# Patient Record
Sex: MALE | Race: WHITE | Employment: OTHER | URBAN - METROPOLITAN AREA
[De-identification: names, ages, dates, MRNs, and addresses within clinical notes are randomized per-mention and may not be internally consistent; named-entity substitution may affect disease eponyms.]

---

## 2017-01-15 ENCOUNTER — HOSPITAL ENCOUNTER (EMERGENCY)
Age: 82
Discharge: HOME | End: 2017-01-16
Attending: STUDENT IN AN ORGANIZED HEALTH CARE EDUCATION/TRAINING PROGRAM
Payer: MEDICARE

## 2017-01-15 DIAGNOSIS — N10 ACUTE PYELONEPHRITIS: Primary | ICD-10-CM

## 2017-01-15 DIAGNOSIS — N28.9 RENAL INSUFFICIENCY: ICD-10-CM

## 2017-01-15 LAB
ALBUMIN SERPL BCP-MCNC: 3.1 G/DL (ref 3.2–4.6)
ALBUMIN/GLOB SERPL: 0.8 {RATIO} (ref 1.2–3.5)
ALP SERPL-CCNC: 77 U/L (ref 50–136)
ALT SERPL-CCNC: 18 U/L (ref 12–65)
ANION GAP BLD CALC-SCNC: 8 MMOL/L (ref 7–16)
AST SERPL W P-5'-P-CCNC: 49 U/L (ref 15–37)
BASOPHILS # BLD AUTO: 0 K/UL (ref 0–0.2)
BASOPHILS # BLD: 0 % (ref 0–2)
BILIRUB SERPL-MCNC: 0.5 MG/DL (ref 0.2–1.1)
BUN SERPL-MCNC: 26 MG/DL (ref 8–23)
CALCIUM SERPL-MCNC: 8.1 MG/DL (ref 8.3–10.4)
CHLORIDE SERPL-SCNC: 107 MMOL/L (ref 98–107)
CO2 SERPL-SCNC: 25 MMOL/L (ref 21–32)
CREAT SERPL-MCNC: 2.01 MG/DL (ref 0.8–1.5)
DIFFERENTIAL METHOD BLD: ABNORMAL
EOSINOPHIL # BLD: 0.1 K/UL (ref 0–0.8)
EOSINOPHIL NFR BLD: 1 % (ref 0.5–7.8)
ERYTHROCYTE [DISTWIDTH] IN BLOOD BY AUTOMATED COUNT: 14.2 % (ref 11.9–14.6)
FLUAV AG NPH QL IA: NEGATIVE
FLUBV AG NPH QL IA: NEGATIVE
GLOBULIN SER CALC-MCNC: 4 G/DL (ref 2.3–3.5)
GLUCOSE SERPL-MCNC: 114 MG/DL (ref 65–100)
HCT VFR BLD AUTO: 37 % (ref 41.1–50.3)
HGB BLD-MCNC: 12.1 G/DL (ref 13.6–17.2)
IMM GRANULOCYTES # BLD: 0 K/UL (ref 0–0.5)
IMM GRANULOCYTES NFR BLD AUTO: 0.2 % (ref 0–5)
LYMPHOCYTES # BLD AUTO: 5 % (ref 13–44)
LYMPHOCYTES # BLD: 0.7 K/UL (ref 0.5–4.6)
MCH RBC QN AUTO: 27.8 PG (ref 26.1–32.9)
MCHC RBC AUTO-ENTMCNC: 32.7 G/DL (ref 31.4–35)
MCV RBC AUTO: 85.1 FL (ref 79.6–97.8)
MONOCYTES # BLD: 0.9 K/UL (ref 0.1–1.3)
MONOCYTES NFR BLD AUTO: 7 % (ref 4–12)
NEUTS SEG # BLD: 10.5 K/UL (ref 1.7–8.2)
NEUTS SEG NFR BLD AUTO: 87 % (ref 43–78)
PLATELET # BLD AUTO: 184 K/UL (ref 150–450)
PMV BLD AUTO: 11.2 FL (ref 10.8–14.1)
POTASSIUM SERPL-SCNC: 5.5 MMOL/L (ref 3.5–5.1)
PROT SERPL-MCNC: 7.1 G/DL (ref 6.3–8.2)
RBC # BLD AUTO: 4.35 M/UL (ref 4.23–5.67)
SODIUM SERPL-SCNC: 140 MMOL/L (ref 136–145)
WBC # BLD AUTO: 12.1 K/UL (ref 4.3–11.1)

## 2017-01-15 PROCEDURE — 87804 INFLUENZA ASSAY W/OPTIC: CPT | Performed by: EMERGENCY MEDICINE

## 2017-01-15 PROCEDURE — 80053 COMPREHEN METABOLIC PANEL: CPT | Performed by: EMERGENCY MEDICINE

## 2017-01-15 PROCEDURE — 85025 COMPLETE CBC W/AUTO DIFF WBC: CPT | Performed by: EMERGENCY MEDICINE

## 2017-01-15 PROCEDURE — 96365 THER/PROPH/DIAG IV INF INIT: CPT | Performed by: EMERGENCY MEDICINE

## 2017-01-15 PROCEDURE — 81003 URINALYSIS AUTO W/O SCOPE: CPT | Performed by: EMERGENCY MEDICINE

## 2017-01-15 PROCEDURE — 99285 EMERGENCY DEPT VISIT HI MDM: CPT | Performed by: EMERGENCY MEDICINE

## 2017-01-16 ENCOUNTER — APPOINTMENT (OUTPATIENT)
Dept: CT IMAGING | Age: 82
End: 2017-01-16
Attending: STUDENT IN AN ORGANIZED HEALTH CARE EDUCATION/TRAINING PROGRAM
Payer: MEDICARE

## 2017-01-16 VITALS
HEIGHT: 73 IN | TEMPERATURE: 98.9 F | HEART RATE: 84 BPM | OXYGEN SATURATION: 98 % | BODY MASS INDEX: 30.48 KG/M2 | SYSTOLIC BLOOD PRESSURE: 142 MMHG | WEIGHT: 230 LBS | RESPIRATION RATE: 17 BRPM | DIASTOLIC BLOOD PRESSURE: 70 MMHG

## 2017-01-16 LAB
BACTERIA URNS QL MICRO: ABNORMAL /HPF
CASTS URNS QL MICRO: ABNORMAL /LPF
CRYSTALS URNS QL MICRO: 0 /LPF
EPI CELLS #/AREA URNS HPF: ABNORMAL /HPF
LACTATE BLD-SCNC: 0.7 MMOL/L (ref 0.5–1.9)
MUCOUS THREADS URNS QL MICRO: 0 /LPF
RBC #/AREA URNS HPF: ABNORMAL /HPF
WBC URNS QL MICRO: >100 /HPF

## 2017-01-16 PROCEDURE — 74011250636 HC RX REV CODE- 250/636: Performed by: STUDENT IN AN ORGANIZED HEALTH CARE EDUCATION/TRAINING PROGRAM

## 2017-01-16 PROCEDURE — 83605 ASSAY OF LACTIC ACID: CPT

## 2017-01-16 PROCEDURE — 87086 URINE CULTURE/COLONY COUNT: CPT | Performed by: STUDENT IN AN ORGANIZED HEALTH CARE EDUCATION/TRAINING PROGRAM

## 2017-01-16 PROCEDURE — 87088 URINE BACTERIA CULTURE: CPT | Performed by: STUDENT IN AN ORGANIZED HEALTH CARE EDUCATION/TRAINING PROGRAM

## 2017-01-16 PROCEDURE — 81015 MICROSCOPIC EXAM OF URINE: CPT | Performed by: STUDENT IN AN ORGANIZED HEALTH CARE EDUCATION/TRAINING PROGRAM

## 2017-01-16 PROCEDURE — 87186 SC STD MICRODIL/AGAR DIL: CPT | Performed by: STUDENT IN AN ORGANIZED HEALTH CARE EDUCATION/TRAINING PROGRAM

## 2017-01-16 PROCEDURE — 74011000258 HC RX REV CODE- 258: Performed by: STUDENT IN AN ORGANIZED HEALTH CARE EDUCATION/TRAINING PROGRAM

## 2017-01-16 PROCEDURE — 74176 CT ABD & PELVIS W/O CONTRAST: CPT

## 2017-01-16 RX ORDER — CEFPODOXIME PROXETIL 100 MG/1
100 TABLET, FILM COATED ORAL 2 TIMES DAILY
Qty: 20 TAB | Refills: 0 | Status: SHIPPED | OUTPATIENT
Start: 2017-01-16 | End: 2017-06-07

## 2017-01-16 RX ADMIN — CEFTRIAXONE SODIUM 1 G: 1 INJECTION, POWDER, FOR SOLUTION INTRAMUSCULAR; INTRAVENOUS at 01:42

## 2017-01-16 RX ADMIN — SODIUM CHLORIDE 500 ML: 900 INJECTION, SOLUTION INTRAVENOUS at 00:46

## 2017-01-16 NOTE — ED PROVIDER NOTES
HPI Comments: 80-year-old male patient presents to emergency department with reports of right-sided flank pain, dysuria, urinary frequency and fever of 103 at home. Patient has a history of recurrent UTIs, kidney stones and previous urosepsis requiring ICU admission. Patient has been started on Cipro per the request of his regular urologist.  He has a standing order for the Cipro prescription which was initiated today when he developed a fever. He reports mild discomfort at his right flank non-radiating and constant in nature since onset. Patient patient received 2 doses of Tylenol at home prior to arrival with good resolution of his fever. Patient denies any vomiting, chest pain, shortness of breath, anterior abdominal pain or changes in bowel habits. Patient is a 80 y.o. male presenting with urinary pain. The history is provided by the patient and a relative. No  was used. Urinary Pain    This is a new problem. The current episode started 3 to 5 hours ago. The problem occurs intermittently. The problem has not changed since onset. The quality of the pain is described as burning. The pain is at a severity of 4/10. The pain is mild. There has been a fever of 103 - 104 F. The fever has been present for less than 1 day. There is a history of pyelonephritis. Associated symptoms include chills, nausea, frequency, urgency, flank pain and back pain. Pertinent negatives include no sweats, no vomiting, no discharge, no hematuria, no hesitancy, no vaginal discharge, no penile discharge and no abdominal pain. The patient is not pregnant. He has tried antibiotics for the symptoms. The treatment provided no relief. His past medical history is significant for kidney stones and recurrent UTIs. His past medical history does not include single kidney, urological procedure, urinary stasis, catheterization or urinary catheter problem.         Past Medical History:   Diagnosis Date    Arthritis     Asthma as a kid. none recent    Atrial flutter with rapid ventricular response ([address] 6/18/2014     1. Diagnosed 6/18/14: s/p ablation. 2. Echo (6/19/14): EF 45-50%. Moderate mitral regurgitation. 3. Ecardio (8/12-8/25/14): No atrial arrhythmias.  Basal cell carcinoma of skin of other and unspecified parts of face 2/20/2014    Benign hypertensive heart disease without heart failure 9/29/2015    BPH (benign prostatic hyperplasia) 4/16/2013    CAD (coronary artery disease)      CABG, cardiac cath , stents times 2    Calculus of kidney 2/20/2014    Cancer [facility])     Carotid artery stenosis with cerebral infarction ([address] 9/29/2015     Followed by Dr. Philip Newby.  Chest pain, unspecified 12/11/2013    CVA (cerebral vascular accident) ([address] 4/16/2013    Diarrhea following gastrointestinal surgery 4/16/2013    Diverticulitis colectomy    Dizziness 6/18/2014    Generalized weakness     GERD (gastroesophageal reflux disease)     Gout 4/16/2013    Heart disease, unspecified 2/20/2014    History of basal cell cancer nose    History of CVA (cerebrovascular accident) 6/18/2014     History of mini-strokes.      History of kidney stones 2006    Hyperlipidemia 4/16/2013    Hypertension     Hypertrophy of prostate with urinary obstruction and other lower urinary tract symptoms (LUTS) 2/20/2014    Hypothyroidism 4/16/2013    Incomplete bladder emptying 2/20/2014    Iron deficiency anemia 4/16/2013    NSTEMI (non-ST elevated myocardial infarction) ([address] 12/12/2013    Osteoarthritis 4/16/2013    Platelet inhibition due to Plavix     Pure hypercholesterolemia 2/20/2014    Stroke [facility])      x3 with the last one being 2/12    Unspecified adverse effect of anesthesia      state he woke up during heart surgery    Unspecified asthma 2/20/2014    Unspecified disorder of thyroid 2/20/2014    Urinary frequency 2/20/2014    Urinary tract infection, site not specified 11/20/2013       Past Surgical History: Procedure Laterality Date    Hx cholecystectomy      Hx small bowel resection      Pr total knee arthroplasty  2007     right    Pr total knee arthroplasty  2012     left    Pr removal of kidney stone  05/2016    Hx carotid endarterectomy Right 2011    Hx appendectomy  age 15    Hx cataract removal Bilateral 2006    Hx tonsillectomy      Hx adenoidectomy      Pr cabg, artery-vein, four  2005    Pr left heart cath,percutaneous  12/12/2013     bmsx2 svg to !st obtuse marginal    Hx other surgical  3/12/14     Plasma Button procedure of prostate    Hx coronary stent placement           Family History:   Problem Relation Age of Onset    Heart Disease Father     Heart Attack Father     Stroke Sister     Cancer Sister      brain    Heart Disease Mother     Bleeding Prob Mother 80     breast cancer       Social History     Social History    Marital status:      Spouse name: N/A    Number of children: N/A    Years of education: N/A     Occupational History    Not on file. Social History Main Topics    Smoking status: Former Smoker     Types: Cigars     Quit date: 1/1/2002    Smokeless tobacco: Never Used      Comment: quit 2002- cigars    Alcohol use No      Comment: rare    Drug use: No    Sexual activity: Not on file     Other Topics Concern    Not on file     Social History Narrative         ALLERGIES: Review of patient's allergies indicates no known allergies. Review of Systems   Constitutional: Positive for chills. Negative for diaphoresis and fever. HENT: Negative for congestion, sneezing and sore throat. Eyes: Negative for visual disturbance. Respiratory: Negative for cough, chest tightness, shortness of breath and wheezing. Cardiovascular: Negative for chest pain and leg swelling. Gastrointestinal: Positive for nausea. Negative for abdominal pain, blood in stool, diarrhea and vomiting. Endocrine: Negative for polyuria.    Genitourinary: Positive for flank pain, frequency and urgency. Negative for difficulty urinating, dysuria, hematuria, hesitancy, penile discharge and vaginal discharge. Musculoskeletal: Positive for back pain. Negative for myalgias, neck pain and neck stiffness. Skin: Negative for color change and rash. Neurological: Negative for dizziness, syncope, speech difficulty, weakness, light-headedness, numbness and headaches. Psychiatric/Behavioral: Negative for behavioral problems. All other systems reviewed and are negative. Vitals:    01/15/17 2211 01/16/17 0111 01/16/17 0200 01/16/17 0210   BP: 130/51 149/65     Pulse: 86      Resp: 16      Temp: 99.8 °F (37.7 °C)      SpO2: 94% 96% 98% 97%   Weight: 104.3 kg (230 lb)      Height: 6' 1\" (1.854 m)               Physical Exam   Constitutional: He is oriented to person, place, and time. He appears well-developed and well-nourished. No distress. Alert and oriented ×3. No acute distress. HENT:   Head: Normocephalic and atraumatic. Eyes: Conjunctivae and EOM are normal. Pupils are equal, round, and reactive to light. Neck: Normal range of motion and full passive range of motion without pain. Neck supple. No JVD present. No tracheal deviation present. Cardiovascular: Normal rate, regular rhythm, S1 normal, S2 normal, normal heart sounds and intact distal pulses. Exam reveals no gallop, no distant heart sounds and no friction rub. No murmur heard. Pulmonary/Chest: Effort normal and breath sounds normal. No accessory muscle usage or stridor. No tachypnea and no bradypnea. No respiratory distress. He has no decreased breath sounds. He has no wheezes. He has no rhonchi. He has no rales. He exhibits no tenderness. Clear to auscultation throughout. Abdominal: Soft. Normal appearance. He exhibits no distension and no mass. There is no hepatosplenomegaly, splenomegaly or hepatomegaly. There is no tenderness.  There is no rigidity, no rebound, no guarding, no CVA tenderness, no tenderness at McBurney's point and negative Hnesley's sign. No reproducible CVA tenderness on exam.   Musculoskeletal: Normal range of motion. He exhibits no edema, tenderness or deformity. Neurological: He is alert and oriented to person, place, and time. No cranial nerve deficit. Skin: Skin is warm and dry. No rash noted. He is not diaphoretic. Psychiatric: He has a normal mood and affect. His behavior is normal.   Nursing note and vitals reviewed. MDM  Number of Diagnoses or Management Options  Diagnosis management comments: Previous urine cultures show Escherichia coli is prominent growth. Patient will be treated with Rocephin IV secondary to leukouria and bacturia. Lactic acid WNL. Rapid flu negative    Dr. Guy Ruth agrees to follow-up CT urogram are resulted and dyspnea. Discussed plan of care with patient and family at bedside voice understanding and agreement.        Amount and/or Complexity of Data Reviewed  Clinical lab tests: ordered and reviewed  Tests in the radiology section of CPT®: ordered and reviewed  Tests in the medicine section of CPT®: ordered and reviewed  Independent visualization of images, tracings, or specimens: yes    Risk of Complications, Morbidity, and/or Mortality  Presenting problems: moderate  Diagnostic procedures: low  Management options: moderate    Patient Progress  Patient progress: stable    ED Course       Procedures

## 2017-01-16 NOTE — ED NOTES
Assumed care of patient at shift change. Patient history, physical, and results reviewed by myself. Independent exam performed. Assumed sign out pending CT results. CT shows no ureteral stones, but stable bilateral kidney stones. No fat stranding surrounding the kidneys or other surgical findings. Offered hospital admission in light of history of urosepsis, fever at home, slightly worsening renal insufficiency, and mild leukocyte esterase. Patient will prefer attempt at outpatient antibiotics. He understands to return for any worsening symptoms including persistent fever, pain, weakness, vomiting, or other inability to take antibiotics. Patient smiling, laughing, and appears comfortable. He received IV Rocephin and fluids. Advised importance of following up urine culture with primary care physician or urologist.    Funmilayo Tracey Cont    Result Date: 1/16/2017  CT abdomen and pelvis without contrast COMPARISON: November 19, 2013. INDICATION: Right flank pain. History of stones. . TECHNIQUE: CT imaging through the abdomen and pelvis was performed without intravenous. Radiation dose reduction techniques were used for this study:  Our CT scanners use one or all of the following: Automated exposure control, adjustment of the mA and/or kVp according to patient's size, iterative reconstruction. FINDINGS: There is chronic atelectasis at the left lung base. Abdomen findings: Sensitivity is diminished due to the absence of IV contrast. There are bilateral nonobstructing renal calculi. There is no hydronephrosis. There is a 5 cm right parapelvic cyst, similar to prior exam. Smaller cystic lesion is present at the lower pole of the right kidney. The left kidney is atrophic. Visualized inferior liver and spleen are unremarkable. Gallbladder is not seen. The unenhanced pancreas, and the adrenal glands are within normal limits. Abdominal aorta is normal in course and caliber with atherosclerotic calcifications.  Stomach is normal in contour. Small bowel loops are of normal caliber. No small bowel obstruction. No evidence of lymphadenopathy. Pelvic findings: Prostate gland is prominent. Urinary bladder is normal in contour. Colon is normal in caliber. No evidence of appendicitis. There is no free air or free fluid. Mild degenerative changes of the spine. IMPRESSION: 1. Bilateral nonobstructing renal calculi. No hydronephrosis. 2. Atrophic left kidney. 3.  Moderate fluid in the right colon may be secondary to underlying enteritis. No bowel obstruction. I discussed the results of all labs, procedures, radiographs, and treatments with the patient and available family. Treatment plan is agreed upon and the patient is ready for discharge. Questions about treatment in the ED and differential diagnosis of presenting condition were answered. Patient was given verbal discharge instructions including, but not limited to, importance of returning to the emergency department for any concern of worsening or continued symptoms. Instructions were given to follow up with a primary care provider or specialist within 1-2 days. Adverse effects of medications, if prescribed, were discussed and patient was advised to refrain from significant physical activity until followed up by primary care physician and to not drive or operate heavy machinery after taking any sedating substances.

## 2017-01-16 NOTE — DISCHARGE INSTRUCTIONS
Kidney Infection: Care Instructions  Your Care Instructions  A kidney infection (pyelonephritis) is a type of urinary tract infection, or UTI. Most UTIs are bladder infections. Kidney infections tend to make people much sicker than bladder infections do. A kidney infection is also more serious because it can cause lasting damage if it is not treated quickly. Follow-up care is a key part of your treatment and safety. Be sure to make and go to all appointments, and call your doctor if you are having problems. Its also a good idea to know your test results and keep a list of the medicines you take. How can you care for yourself at home? · Take your antibiotics as directed. Do not stop taking them just because you feel better. You need to take the full course of antibiotics. · Drink plenty of water, enough so that your urine is light yellow or clear like water. This may help wash out bacteria that are causing the infection. If you have kidney, heart, or liver disease and have to limit fluids, talk with your doctor before you increase the amount of fluids you drink. · Urinate often. Try to empty your bladder each time. · To relieve pain, take a hot shower or lay a heating pad (set on low) over your lower belly. Never go to sleep with a heating pad in place. Put a thin cloth between the heating pad and your skin. To help prevent kidney infections  · Drink plenty of water each day. This helps you urinate often, which clears bacteria from your system. If you have kidney, heart, or liver disease and have to limit fluids, talk with your doctor before you increase the amount of fluids you drink. · Include cranberry juice in your diet. · Urinate when you have the urge. Do not hold your urine for a long time. Urinate before you go to sleep. · If you have symptoms of a bladder infection, such as burning when you urinate or having to urinate often, call your doctor so you can treat the problem before it gets worse. If you do not treat a bladder infection quickly, it can spread to the kidney. · Men should keep the tip of the penis clean. If you are a woman, keep these ideas in mind:  · Urinate right after you have sex. · Change sanitary pads often. Avoid douches, feminine hygiene sprays, and other feminine hygiene products that have deodorants. · After going to the bathroom, wipe from front to back. When should you call for help? Call your doctor now or seek immediate medical care if:  · You have increasing pain in your back just below the rib cage. This is called flank pain. · You have a new or higher fever and chills. · You are vomiting or nauseated. Watch closely for changes in your health, and be sure to contact your doctor if:  · Symptoms, such as burning when you urinate, get worse or get better but then come back. · You are not getting better after 2 days. Where can you learn more? Go to http://vazquez-glory.info/. Enter U908 in the search box to learn more about \"Kidney Infection: Care Instructions. \"  Current as of: November 20, 2015  Content Version: 11.1  © 1296-9057 [employer]. Care instructions adapted under license by [employer] (which disclaims liability or warranty for this information). If you have questions about a medical condition or this instruction, always ask your healthcare professional. [place] [address] any warranty or liability for your use of this information.

## 2017-01-16 NOTE — ED NOTES
I have reviewed discharge instructions with the patient. The patient verbalized understanding. Patient appears in no acute distress upon discharge.

## 2017-01-16 NOTE — ED TRIAGE NOTES
PT arrived to ED via EMS. Per EMS family stated PT has a fever over 103 degrees. Upon arrival PT had a temp of 99.8. PT has Hx Kidney problems. PT states he has pain in his legs. PT states he is having difficulty urinating.

## 2017-01-17 ENCOUNTER — PATIENT OUTREACH (OUTPATIENT)
Dept: CASE MANAGEMENT | Age: 82
End: 2017-01-17

## 2017-01-17 NOTE — PROGRESS NOTES
This note will not be viewable in [address] ED Transition of Care Discharge Follow-up Questionnaire   Date/Time of Call:   1/17/2017  1:02 pm      What was the patient seen in the ED for? Patient was seen in ED for diagnosis of:  Acute pyelonephritis and renal insufficiency. Does the patient understand his/her diagnosis and/or treatment and what happened during the ED visit? Patient voiced understanding of diagnosis and /or treatment. Did the patient receive discharge instructions from the ED? Yes. Patient states discharge instructions explained and received before discharge to home. Review any discharge instructions (see notes in [facility]). Ask patient if they understand these. Do they have any questions? Care Coordinator and patient reviewed discharge instructions per [facility]. Opportunity for questions and clarification provided. Patient verbalized understanding of instructions. Were home services ordered (nursing, PT, OT, ST, etc.)? No home services were ordered. If so, has the first visit occurred? If not, why? (Assist with coordination of services if necessary.)      n/a     Was any DME ordered? No DME ordered. If so, has it been received? If not, why?  (Assist with coordination of arranging DME orders if necessary.)   n/a     Complete a review of all medications (new, continued and discontinued meds per the D/C instructions and medication tab in [facility]). Care Coordinator and patient reviewed medications per [facility]. Were all new prescriptions filled? If not, why?  (Assist with obtainment of medications if necessary.)   Vantin was prescribed by ED Care Provider and is being taken as ordered. Does the patient understand the purpose and dosing instructions for all medications? (If patient has questions, provide explanation and education.)   Patient voiced understanding of purpose and dosing instructions for all medications. Does the patient have any problems in performing ADLs? (If patient is unable to perform ADLs  what is the limiting factor(s)? Do they have a support system that can assist? If no support system is present, discuss possible assistance that they may be able to obtain.)       Patient is independent and able to perform ADLs. Does the patient have all follow-up appointments scheduled? Has transportation been arranged? [facility] follow-up should be within 7 days of discharge; all others should have PCP follow-up within 7   Days of discharge; follow-ups with other specialists as appropriate or ordered.)        Patient encouraged and voiced agreement to schedule ED follow up with Dr. Wendy Rangel within 7 days. Patient also encouraged to schedule follow up appointment with Dr. Benoit Abarca as recommended by ED Care Provider. Patient has transportation to appointments. Any other questions or concerns expressed by the patient? Patient voiced no other questions or concerns and was appreciative of call. No other needs identified.          AYANNA Call Completed By:   Canelo Lama, [employer] Coordinator  Jay SUAREZ

## 2017-01-19 LAB
BACTERIA SPEC CULT: NORMAL
SERVICE CMNT-IMP: NORMAL

## 2018-03-26 ENCOUNTER — APPOINTMENT (OUTPATIENT)
Dept: ULTRASOUND IMAGING | Age: 83
DRG: 065 | End: 2018-03-26
Attending: INTERNAL MEDICINE
Payer: MEDICARE

## 2018-03-26 ENCOUNTER — APPOINTMENT (OUTPATIENT)
Dept: CT IMAGING | Age: 83
DRG: 065 | End: 2018-03-26
Attending: EMERGENCY MEDICINE
Payer: MEDICARE

## 2018-03-26 ENCOUNTER — HOSPITAL ENCOUNTER (INPATIENT)
Age: 83
LOS: 1 days | Discharge: ANOTHER HOSPITAL | DRG: 065 | End: 2018-03-29
Attending: EMERGENCY MEDICINE | Admitting: INTERNAL MEDICINE
Payer: MEDICARE

## 2018-03-26 ENCOUNTER — APPOINTMENT (OUTPATIENT)
Dept: GENERAL RADIOLOGY | Age: 83
DRG: 065 | End: 2018-03-26
Attending: EMERGENCY MEDICINE
Payer: MEDICARE

## 2018-03-26 DIAGNOSIS — I10 HTN (HYPERTENSION), MALIGNANT: ICD-10-CM

## 2018-03-26 DIAGNOSIS — G45.9 TRANSIENT CEREBRAL ISCHEMIA, UNSPECIFIED TYPE: Primary | ICD-10-CM

## 2018-03-26 DIAGNOSIS — N30.00 ACUTE CYSTITIS WITHOUT HEMATURIA: ICD-10-CM

## 2018-03-26 LAB
ALBUMIN SERPL-MCNC: 3.1 G/DL (ref 3.2–4.6)
ALBUMIN/GLOB SERPL: 0.8 {RATIO} (ref 1.2–3.5)
ALP SERPL-CCNC: 77 U/L (ref 50–136)
ALT SERPL-CCNC: 14 U/L (ref 12–65)
ANION GAP SERPL CALC-SCNC: 5 MMOL/L (ref 7–16)
AST SERPL-CCNC: 13 U/L (ref 15–37)
ATRIAL RATE: 68 BPM
BACTERIA URNS QL MICRO: ABNORMAL /HPF
BASOPHILS # BLD: 0 K/UL (ref 0–0.2)
BASOPHILS NFR BLD: 0 % (ref 0–2)
BILIRUB SERPL-MCNC: 0.3 MG/DL (ref 0.2–1.1)
BUN SERPL-MCNC: 27 MG/DL (ref 8–23)
CALCIUM SERPL-MCNC: 9.2 MG/DL (ref 8.3–10.4)
CALCULATED P AXIS, ECG09: 46 DEGREES
CALCULATED R AXIS, ECG10: -72 DEGREES
CALCULATED T AXIS, ECG11: 89 DEGREES
CASTS URNS QL MICRO: ABNORMAL /LPF
CHLORIDE SERPL-SCNC: 108 MMOL/L (ref 98–107)
CO2 SERPL-SCNC: 29 MMOL/L (ref 21–32)
CREAT SERPL-MCNC: 1.87 MG/DL (ref 0.8–1.5)
DIAGNOSIS, 93000: NORMAL
DIFFERENTIAL METHOD BLD: ABNORMAL
EOSINOPHIL # BLD: 0.9 K/UL (ref 0–0.8)
EOSINOPHIL NFR BLD: 9 % (ref 0.5–7.8)
EPI CELLS #/AREA URNS HPF: ABNORMAL /HPF
ERYTHROCYTE [DISTWIDTH] IN BLOOD BY AUTOMATED COUNT: 13.8 % (ref 11.9–14.6)
GLOBULIN SER CALC-MCNC: 3.9 G/DL (ref 2.3–3.5)
GLUCOSE SERPL-MCNC: 120 MG/DL (ref 65–100)
HCT VFR BLD AUTO: 38 % (ref 41.1–50.3)
HGB BLD-MCNC: 12.5 G/DL (ref 13.6–17.2)
IMM GRANULOCYTES # BLD: 0 K/UL (ref 0–0.5)
IMM GRANULOCYTES NFR BLD AUTO: 0 % (ref 0–5)
LACTATE BLD-SCNC: 1.3 MMOL/L (ref 0.5–1.9)
LYMPHOCYTES # BLD: 2.3 K/UL (ref 0.5–4.6)
LYMPHOCYTES NFR BLD: 22 % (ref 13–44)
MCH RBC QN AUTO: 28.4 PG (ref 26.1–32.9)
MCHC RBC AUTO-ENTMCNC: 32.9 G/DL (ref 31.4–35)
MCV RBC AUTO: 86.4 FL (ref 79.6–97.8)
MONOCYTES # BLD: 0.7 K/UL (ref 0.1–1.3)
MONOCYTES NFR BLD: 6 % (ref 4–12)
NEUTS SEG # BLD: 6.4 K/UL (ref 1.7–8.2)
NEUTS SEG NFR BLD: 63 % (ref 43–78)
P-R INTERVAL, ECG05: 204 MS
PLATELET # BLD AUTO: 204 K/UL (ref 150–450)
PMV BLD AUTO: 10.8 FL (ref 10.8–14.1)
POTASSIUM SERPL-SCNC: 4.5 MMOL/L (ref 3.5–5.1)
PROCALCITONIN SERPL-MCNC: <0.1 NG/ML
PROT SERPL-MCNC: 7 G/DL (ref 6.3–8.2)
Q-T INTERVAL, ECG07: 446 MS
QRS DURATION, ECG06: 166 MS
QTC CALCULATION (BEZET), ECG08: 471 MS
RBC # BLD AUTO: 4.4 M/UL (ref 4.23–5.67)
RBC #/AREA URNS HPF: ABNORMAL /HPF
SODIUM SERPL-SCNC: 142 MMOL/L (ref 136–145)
TROPONIN I SERPL-MCNC: <0.02 NG/ML (ref 0.02–0.05)
TSH SERPL DL<=0.005 MIU/L-ACNC: 4.86 UIU/ML (ref 0.36–3.74)
VENTRICULAR RATE, ECG03: 67 BPM
WBC # BLD AUTO: 10.3 K/UL (ref 4.3–11.1)
WBC URNS QL MICRO: >100 /HPF

## 2018-03-26 PROCEDURE — 93005 ELECTROCARDIOGRAM TRACING: CPT | Performed by: EMERGENCY MEDICINE

## 2018-03-26 PROCEDURE — 80053 COMPREHEN METABOLIC PANEL: CPT | Performed by: EMERGENCY MEDICINE

## 2018-03-26 PROCEDURE — 81003 URINALYSIS AUTO W/O SCOPE: CPT | Performed by: EMERGENCY MEDICINE

## 2018-03-26 PROCEDURE — 84145 PROCALCITONIN (PCT): CPT | Performed by: INTERNAL MEDICINE

## 2018-03-26 PROCEDURE — 84443 ASSAY THYROID STIM HORMONE: CPT | Performed by: INTERNAL MEDICINE

## 2018-03-26 PROCEDURE — 73562 X-RAY EXAM OF KNEE 3: CPT

## 2018-03-26 PROCEDURE — 87086 URINE CULTURE/COLONY COUNT: CPT | Performed by: INTERNAL MEDICINE

## 2018-03-26 PROCEDURE — 74011250636 HC RX REV CODE- 250/636: Performed by: EMERGENCY MEDICINE

## 2018-03-26 PROCEDURE — 72125 CT NECK SPINE W/O DYE: CPT

## 2018-03-26 PROCEDURE — 83605 ASSAY OF LACTIC ACID: CPT

## 2018-03-26 PROCEDURE — 85025 COMPLETE CBC W/AUTO DIFF WBC: CPT | Performed by: EMERGENCY MEDICINE

## 2018-03-26 PROCEDURE — 81015 MICROSCOPIC EXAM OF URINE: CPT | Performed by: EMERGENCY MEDICINE

## 2018-03-26 PROCEDURE — 74011000258 HC RX REV CODE- 258: Performed by: EMERGENCY MEDICINE

## 2018-03-26 PROCEDURE — 99218 HC RM OBSERVATION: CPT

## 2018-03-26 PROCEDURE — 74011250636 HC RX REV CODE- 250/636: Performed by: INTERNAL MEDICINE

## 2018-03-26 PROCEDURE — 99285 EMERGENCY DEPT VISIT HI MDM: CPT | Performed by: EMERGENCY MEDICINE

## 2018-03-26 PROCEDURE — 74011250637 HC RX REV CODE- 250/637: Performed by: EMERGENCY MEDICINE

## 2018-03-26 PROCEDURE — 87088 URINE BACTERIA CULTURE: CPT | Performed by: INTERNAL MEDICINE

## 2018-03-26 PROCEDURE — 84484 ASSAY OF TROPONIN QUANT: CPT | Performed by: EMERGENCY MEDICINE

## 2018-03-26 PROCEDURE — 74011250637 HC RX REV CODE- 250/637: Performed by: INTERNAL MEDICINE

## 2018-03-26 PROCEDURE — 70450 CT HEAD/BRAIN W/O DYE: CPT

## 2018-03-26 RX ORDER — BISACODYL 5 MG
5 TABLET, DELAYED RELEASE (ENTERIC COATED) ORAL DAILY PRN
Status: DISCONTINUED | OUTPATIENT
Start: 2018-03-26 | End: 2018-03-29 | Stop reason: HOSPADM

## 2018-03-26 RX ORDER — ESCITALOPRAM OXALATE 10 MG/1
10 TABLET ORAL DAILY
Status: DISCONTINUED | OUTPATIENT
Start: 2018-03-27 | End: 2018-03-29 | Stop reason: HOSPADM

## 2018-03-26 RX ORDER — SODIUM CHLORIDE 0.9 % (FLUSH) 0.9 %
5-10 SYRINGE (ML) INJECTION EVERY 8 HOURS
Status: DISCONTINUED | OUTPATIENT
Start: 2018-03-26 | End: 2018-03-29 | Stop reason: HOSPADM

## 2018-03-26 RX ORDER — HEPARIN SODIUM 5000 [USP'U]/ML
5000 INJECTION, SOLUTION INTRAVENOUS; SUBCUTANEOUS EVERY 8 HOURS
Status: DISCONTINUED | OUTPATIENT
Start: 2018-03-26 | End: 2018-03-29

## 2018-03-26 RX ORDER — LEVOTHYROXINE SODIUM 150 UG/1
150 TABLET ORAL
Status: DISCONTINUED | OUTPATIENT
Start: 2018-03-27 | End: 2018-03-29 | Stop reason: HOSPADM

## 2018-03-26 RX ORDER — CARVEDILOL 6.25 MG/1
6.25 TABLET ORAL 2 TIMES DAILY WITH MEALS
Status: DISCONTINUED | OUTPATIENT
Start: 2018-03-27 | End: 2018-03-29 | Stop reason: HOSPADM

## 2018-03-26 RX ORDER — CLOPIDOGREL BISULFATE 75 MG/1
75 TABLET ORAL DAILY
Status: DISCONTINUED | OUTPATIENT
Start: 2018-03-27 | End: 2018-03-29 | Stop reason: HOSPADM

## 2018-03-26 RX ORDER — PANTOPRAZOLE SODIUM 40 MG/1
40 TABLET, DELAYED RELEASE ORAL
Status: DISCONTINUED | OUTPATIENT
Start: 2018-03-27 | End: 2018-03-29 | Stop reason: HOSPADM

## 2018-03-26 RX ORDER — HYDRALAZINE HYDROCHLORIDE 20 MG/ML
10 INJECTION INTRAMUSCULAR; INTRAVENOUS
Status: DISCONTINUED | OUTPATIENT
Start: 2018-03-26 | End: 2018-03-29 | Stop reason: HOSPADM

## 2018-03-26 RX ORDER — SODIUM CHLORIDE 0.9 % (FLUSH) 0.9 %
5-10 SYRINGE (ML) INJECTION AS NEEDED
Status: DISCONTINUED | OUTPATIENT
Start: 2018-03-26 | End: 2018-03-29 | Stop reason: HOSPADM

## 2018-03-26 RX ORDER — ASPIRIN 325 MG
325 TABLET ORAL DAILY
Status: DISCONTINUED | OUTPATIENT
Start: 2018-03-27 | End: 2018-03-29 | Stop reason: HOSPADM

## 2018-03-26 RX ORDER — PRAVASTATIN SODIUM 80 MG/1
80 TABLET ORAL
Status: DISCONTINUED | OUTPATIENT
Start: 2018-03-26 | End: 2018-03-29 | Stop reason: HOSPADM

## 2018-03-26 RX ORDER — ONDANSETRON 2 MG/ML
4 INJECTION INTRAMUSCULAR; INTRAVENOUS
Status: DISCONTINUED | OUTPATIENT
Start: 2018-03-26 | End: 2018-03-29 | Stop reason: HOSPADM

## 2018-03-26 RX ORDER — NIACIN 500 MG/1
1000 TABLET, EXTENDED RELEASE ORAL DAILY
Status: DISCONTINUED | OUTPATIENT
Start: 2018-03-27 | End: 2018-03-29 | Stop reason: HOSPADM

## 2018-03-26 RX ORDER — GUAIFENESIN 100 MG/5ML
324 LIQUID (ML) ORAL
Status: COMPLETED | OUTPATIENT
Start: 2018-03-26 | End: 2018-03-26

## 2018-03-26 RX ORDER — ACETAMINOPHEN 325 MG/1
650 TABLET ORAL
Status: DISCONTINUED | OUTPATIENT
Start: 2018-03-26 | End: 2018-03-29 | Stop reason: HOSPADM

## 2018-03-26 RX ORDER — SODIUM CHLORIDE 9 MG/ML
100 INJECTION, SOLUTION INTRAVENOUS CONTINUOUS
Status: DISPENSED | OUTPATIENT
Start: 2018-03-26 | End: 2018-03-27

## 2018-03-26 RX ADMIN — SODIUM CHLORIDE 100 ML/HR: 900 INJECTION, SOLUTION INTRAVENOUS at 23:14

## 2018-03-26 RX ADMIN — HEPARIN SODIUM 5000 UNITS: 5000 INJECTION, SOLUTION INTRAVENOUS; SUBCUTANEOUS at 23:34

## 2018-03-26 RX ADMIN — Medication 10 ML: at 23:12

## 2018-03-26 RX ADMIN — CEFTRIAXONE SODIUM 1 G: 1 INJECTION, POWDER, FOR SOLUTION INTRAMUSCULAR; INTRAVENOUS at 23:29

## 2018-03-26 RX ADMIN — ASPIRIN 81 MG 324 MG: 81 TABLET ORAL at 20:27

## 2018-03-26 RX ADMIN — PRAVASTATIN SODIUM 80 MG: 80 TABLET ORAL at 23:36

## 2018-03-26 NOTE — ED TRIAGE NOTES
Per EMS, pt was confused and no R  on arrival, but symptoms has since resolved after 30 minutes. Pt now alert and oriented x4 and no deficits upon arrival to ED. EKG intially showed a-fib, but now NSR. .

## 2018-03-26 NOTE — ED PROVIDER NOTES
HPI Comments: 58-year-old male with history of A. Fib flutter, CVA, CAD presents with complaint of confusion and right upper extremity weakness with slurred speech that began at around 1 PM today. States the symptoms resolved after 30 minutes. Denies chest pain, shortness of breath, dizziness, nausea, vomiting, vision disturbance, fever, chills, abdominal pain. Wife states patient had a fall on yesterday hitting right knee. Patient with abrasion to right knee and mild right knee swelling. Pt with history of recurrent UTIs. Patient is a 80 y.o. male presenting with altered mental status. The history is provided by the patient. No  was used. Altered mental status    This is a new problem. The current episode started 3 to 5 hours ago. The problem has been resolved. Pertinent negatives include no weakness and no numbness. Mental status baseline is normal.  His past medical history is significant for CVA. Past Medical History:   Diagnosis Date    Arthritis     Asthma     as a kid. none recent    Atrial flutter with rapid ventricular response ([address] 6/18/2014    1. Diagnosed 6/18/14: s/p ablation. 2. Echo (6/19/14): EF 45-50%. Moderate mitral regurgitation. 3. Ecardio (8/12-8/25/14): No atrial arrhythmias.  Basal cell carcinoma of skin of other and unspecified parts of face 2/20/2014    Benign hypertensive heart disease without heart failure 9/29/2015    BPH (benign prostatic hyperplasia) 4/16/2013    CAD (coronary artery disease)     CABG, cardiac cath , stents times 2    Calculus of kidney 2/20/2014    Cancer [facility])     Carotid artery stenosis with cerebral infarction ([address] 9/29/2015    Followed by Dr. Osiris Sánchez.     Chest pain, unspecified 12/11/2013    CVA (cerebral vascular accident) ([address] 4/16/2013    Diarrhea following gastrointestinal surgery 4/16/2013    Diverticulitis colectomy    Dizziness 6/18/2014    Generalized weakness     GERD (gastroesophageal reflux disease)     Gout 4/16/2013    Heart disease, unspecified 2/20/2014    History of basal cell cancer nose    History of CVA (cerebrovascular accident) 6/18/2014    History of mini-strokes.      History of kidney stones 2006    Hyperlipidemia 4/16/2013    Hypertension     Hypertrophy of prostate with urinary obstruction and other lower urinary tract symptoms (LUTS) 2/20/2014    Hypothyroidism 4/16/2013    Incomplete bladder emptying 2/20/2014    Iron deficiency anemia 4/16/2013    NSTEMI (non-ST elevated myocardial infarction) [address] [facility] 12/12/2013    Osteoarthritis 4/16/2013    Platelet inhibition due to Plavix     Pure hypercholesterolemia 2/20/2014    Stroke [facility])     x3 with the last one being 2/12    Unspecified adverse effect of anesthesia     state he woke up during heart surgery    Unspecified asthma 2/20/2014    Unspecified disorder of thyroid 2/20/2014    Urinary frequency 2/20/2014    Urinary tract infection, site not specified 11/20/2013       Past Surgical History:   Procedure Laterality Date    CABG, ARTERY-VEIN, FOUR  2005    HX ADENOIDECTOMY      HX APPENDECTOMY  age 15   [facility] HX CAROTID ENDARTERECTOMY Right 2011    HX CATARACT REMOVAL Bilateral 2006    HX CHOLECYSTECTOMY      HX CORONARY STENT PLACEMENT      HX OTHER SURGICAL  3/12/14    Plasma Button procedure of prostate    HX SMALL BOWEL RESECTION      HX TONSILLECTOMY      [place] LEFT HEART CATH,PERCUTANEOUS  12/12/2013    bmsx2 svg to !st obtuse marginal    REMOVAL OF KIDNEY STONE  05/2016    TOTAL KNEE ARTHROPLASTY  2007    right    TOTAL KNEE ARTHROPLASTY  2012    left         Family History:   Problem Relation Age of Onset    Heart Disease Father     Heart Attack Father     Stroke Sister     Cancer Sister      brain    Heart Disease Mother     Bleeding Prob Mother 80     breast cancer       Social History     Social History    Marital status:      Spouse name: N/A    Number of children: N/A    Years of education: N/A     Occupational History    Not on file. Social History Main Topics    Smoking status: Former Smoker     Types: Cigars     Quit date: 1/1/2002    Smokeless tobacco: Never Used      Comment: quit 2002- cigars    Alcohol use No      Comment: rare    Drug use: No    Sexual activity: Not Currently     Other Topics Concern    Not on file     Social History Narrative         ALLERGIES: Review of patient's allergies indicates no known allergies. Review of Systems   Constitutional: Negative for chills and fever. HENT: Negative for congestion, facial swelling and sore throat. Respiratory: Negative for cough and shortness of breath. Cardiovascular: Negative for chest pain, palpitations and leg swelling. Gastrointestinal: Negative for abdominal pain, constipation, nausea and vomiting. Genitourinary: Negative for dysuria and hematuria. Musculoskeletal: Positive for arthralgias and joint swelling. Negative for back pain, myalgias and neck pain. Skin: Negative for pallor and wound. Neurological: Positive for speech difficulty. Negative for dizziness, weakness, numbness and headaches. Vitals:    03/26/18 1544 03/26/18 1548 03/26/18 1607 03/26/18 1628   BP:  (!) 214/87 187/77    Pulse: 62   65   Resp: 16   19   Temp:       SpO2: 97%   97%   Weight:       Height:                Physical Exam   Constitutional: He is oriented to person, place, and time. He appears well-developed and well-nourished. HENT:   Head: Normocephalic. Mouth/Throat: Oropharynx is clear and moist.   Eyes: Conjunctivae and EOM are normal. Pupils are equal, round, and reactive to light. Neck: Normal range of motion. No JVD present. No tracheal deviation present. Cardiovascular: Normal rate, regular rhythm, normal heart sounds and intact distal pulses. No murmur heard. Radial pulses 2+ and equal bilaterally. Pulmonary/Chest: Effort normal and breath sounds normal. No respiratory distress.  He has no wheezes. He has no rales. He exhibits no tenderness. Abdominal: Soft. There is no tenderness. There is no rebound. Musculoskeletal: Normal range of motion. He exhibits no edema, tenderness or deformity. Abrasion noted to the right knee. Mild swelling noted to right knee. DP pulses 2+ bilaterally. Strength 5 out of 5 throughout. Normal sensory exam.    Neurological: He is alert and oriented to person, place, and time. No cranial nerve deficit. Coordination normal.   Strength 5/5 throughout. Normal sensory exam.  No slurred speech. No facial droop. Nursing note and vitals reviewed. MDM  Number of Diagnoses or Management Options  Acute cystitis without hematuria: new and requires workup  Transient cerebral ischemia, unspecified type: new and requires workup  Diagnosis management comments: CT head no acute findings. Pt given ASA 324mg po. UA with evidence of UTI. Pt given Rocephin 1g IV. Hospitalist consulted for admission. Amount and/or Complexity of Data Reviewed  Clinical lab tests: ordered and reviewed  Tests in the radiology section of CPT®: ordered and reviewed  Tests in the medicine section of CPT®: ordered and reviewed  Review and summarize past medical records: yes  Independent visualization of images, tracings, or specimens: yes    Risk of Complications, Morbidity, and/or Mortality  Presenting problems: moderate  Diagnostic procedures: moderate  Management options: moderate    Patient Progress  Patient progress: stable        ED Course   Comment By Time   CT head IMPRESSION: No CT evidence of acute intracranial abnormality. Vern Soler MD 03/26 1917   CT Cspine IMPRESSION: Extensive degenerative change noted throughout the cervical spine and upper thoracic spine.  No acute findings. Vern Soler MD 03/26 1918   XR R knee IMPRESSION: Prepatellar soft tissue thickening. No underlying fracture.  Keaton Bañuelos MD 03/26 5483 Procedures    Results Include:    Recent Results (from the past 24 hour(s))   CBC WITH AUTOMATED DIFF    Collection Time: 03/26/18  3:45 PM   Result Value Ref Range    WBC 10.3 4.3 - 11.1 K/uL    RBC 4.40 4.23 - 5.67 M/uL    HGB 12.5 (L) 13.6 - 17.2 g/dL    HCT 38.0 (L) 41.1 - 50.3 %    MCV 86.4 79.6 - 97.8 FL    MCH 28.4 26.1 - 32.9 PG    MCHC 32.9 31.4 - 35.0 g/dL    RDW 13.8 11.9 - 14.6 %    PLATELET 461 559 - 035 K/uL    MPV 10.8 10.8 - 14.1 FL    DF AUTOMATED      NEUTROPHILS 63 43 - 78 %    LYMPHOCYTES 22 13 - 44 %    MONOCYTES 6 4.0 - 12.0 %    EOSINOPHILS 9 (H) 0.5 - 7.8 %    BASOPHILS 0 0.0 - 2.0 %    IMMATURE GRANULOCYTES 0 0.0 - 5.0 %    ABS. NEUTROPHILS 6.4 1.7 - 8.2 K/UL    ABS. LYMPHOCYTES 2.3 0.5 - 4.6 K/UL    ABS. MONOCYTES 0.7 0.1 - 1.3 K/UL    ABS. EOSINOPHILS 0.9 (H) 0.0 - 0.8 K/UL    ABS. BASOPHILS 0.0 0.0 - 0.2 K/UL    ABS. IMM. GRANS. 0.0 0.0 - 0.5 K/UL   METABOLIC PANEL, COMPREHENSIVE    Collection Time: 03/26/18  3:45 PM   Result Value Ref Range    Sodium 142 136 - 145 mmol/L    Potassium 4.5 3.5 - 5.1 mmol/L    Chloride 108 (H) 98 - 107 mmol/L    CO2 29 21 - 32 mmol/L    Anion gap 5 (L) 7 - 16 mmol/L    Glucose 120 (H) 65 - 100 mg/dL    BUN 27 (H) 8 - 23 MG/DL    Creatinine 1.87 (H) 0.8 - 1.5 MG/DL    GFR est AA 45 (L) >60 ml/min/1.73m2    GFR est non-AA 37 (L) >60 ml/min/1.73m2    Calcium 9.2 8.3 - 10.4 MG/DL    Bilirubin, total 0.3 0.2 - 1.1 MG/DL    ALT (SGPT) 14 12 - 65 U/L    AST (SGOT) 13 (L) 15 - 37 U/L    Alk.  phosphatase 77 50 - 136 U/L    Protein, total 7.0 6.3 - 8.2 g/dL    Albumin 3.1 (L) 3.2 - 4.6 g/dL    Globulin 3.9 (H) 2.3 - 3.5 g/dL    A-G Ratio 0.8 (L) 1.2 - 3.5     EKG, 12 LEAD, INITIAL    Collection Time: 03/26/18  3:48 PM   Result Value Ref Range    Ventricular Rate 67 BPM    Atrial Rate 68 BPM    P-R Interval 204 ms    QRS Duration 166 ms    Q-T Interval 446 ms    QTC Calculation (Bezet) 471 ms    Calculated P Axis 46 degrees    Calculated R Axis -72 degrees Calculated T Axis 89 degrees    Diagnosis       !! AGE AND GENDER SPECIFIC ECG ANALYSIS !! Sinus rhythm with occasional Premature ventricular complexes  Right bundle branch block  Left anterior fascicular block  !!! Bifascicular block !!! Left ventricular hypertrophy with QRS widening and repolarization abnormality  Abnormal ECG  When compared with ECG of 19-JUN-2014 15:36,  Premature ventricular complexes are now Present  Nonspecific T wave abnormality no longer evident in Inferior leads  T wave inversion less evident in Anterolateral leads  Confirmed by ST ROMINA JONES MD (), BOB OCAMPO ([id number]) on 3/26/2018 7:44:59 PM     POC LACTIC ACID    Collection Time: 03/26/18  4:06 PM   Result Value Ref Range    Lactic Acid (POC) 1.3 0.5 - 1.9 mmol/L   URINE MICROSCOPIC    Collection Time: 03/26/18  4:27 PM   Result Value Ref Range    WBC >100 (H) 0 /hpf    RBC 10-20 0 /hpf    Epithelial cells 5-10 0 /hpf    Bacteria 4+ (H) 0 /hpf    Casts 3-5 0 /lpf   TROPONIN I    Collection Time: 03/26/18  4:45 PM   Result Value Ref Range    Troponin-I, Qt. <0.02 (L) 0.02 - 0.05 NG/ML       NIHSS Stroke Scale      Interval: Baseline  Time: 5:40 PM    Person Administering Scale: Vern Faustin MD  Administer stroke scale items in the order listed. Record performance in each category after each subscale exam. Do not go back and change scores. Follow directions provided for each exam technique. Scores should reflect what the patient does, not what the clinician thinks the patient can do. The clinician should record answers while administering the exam and work quickly. Except where indicated, the patient should not be coached (i.e., repeated requests to patient to make a special effort). 1a  Level of consciousness: 0=alert; keenly responsive   1b. LOC questions:  0=Answers both questions correctly   1c. LOC commands: 0=Performs both tasks correctly   2. Best Gaze: 0=normal   3. Visual: 0=No visual loss   4.  Facial Palsy: 0=Normal symmetric movement   5a. Motor left arm: 0=No drift, arm holds 90 (or 45) degrees for full 10 seconds   5b. Motor right arm: 0=No drift, arm holds 90 (or 45) degrees for full 10 seconds   6a. Motor left le=No drift; leg holds 30-degree position for full 5 seconds. 6b  Motor right le=No drift; leg holds 30-degree position for full 5 seconds. 7. Limb Ataxia: 0=Absent   8. Sensory: 0=Normal; no sensory loss   9. Best Language:  0=No aphasia, normal   10. Dysarthria: 0=Normal   11.  Extinction and Inattention: 0=No abnormality    Total:   0= No stroke

## 2018-03-27 ENCOUNTER — APPOINTMENT (OUTPATIENT)
Dept: ULTRASOUND IMAGING | Age: 83
DRG: 065 | End: 2018-03-27
Attending: INTERNAL MEDICINE
Payer: MEDICARE

## 2018-03-27 ENCOUNTER — APPOINTMENT (OUTPATIENT)
Dept: CT IMAGING | Age: 83
DRG: 065 | End: 2018-03-27
Attending: INTERNAL MEDICINE
Payer: MEDICARE

## 2018-03-27 ENCOUNTER — APPOINTMENT (OUTPATIENT)
Dept: MRI IMAGING | Age: 83
DRG: 065 | End: 2018-03-27
Attending: INTERNAL MEDICINE
Payer: MEDICARE

## 2018-03-27 LAB
ANION GAP SERPL CALC-SCNC: 9 MMOL/L (ref 7–16)
BASOPHILS # BLD: 0 K/UL (ref 0–0.2)
BASOPHILS NFR BLD: 0 % (ref 0–2)
BUN SERPL-MCNC: 26 MG/DL (ref 8–23)
CALCIUM SERPL-MCNC: 8.6 MG/DL (ref 8.3–10.4)
CHLORIDE SERPL-SCNC: 109 MMOL/L (ref 98–107)
CHOLEST SERPL-MCNC: 133 MG/DL
CO2 SERPL-SCNC: 24 MMOL/L (ref 21–32)
CREAT SERPL-MCNC: 1.57 MG/DL (ref 0.8–1.5)
DIFFERENTIAL METHOD BLD: ABNORMAL
EOSINOPHIL # BLD: 0.9 K/UL (ref 0–0.8)
EOSINOPHIL NFR BLD: 9 % (ref 0.5–7.8)
ERYTHROCYTE [DISTWIDTH] IN BLOOD BY AUTOMATED COUNT: 13.9 % (ref 11.9–14.6)
EST. AVERAGE GLUCOSE BLD GHB EST-MCNC: 117 MG/DL
GLUCOSE SERPL-MCNC: 86 MG/DL (ref 65–100)
HBA1C MFR BLD: 5.7 % (ref 4.8–6)
HCT VFR BLD AUTO: 36.6 % (ref 41.1–50.3)
HDLC SERPL-MCNC: 49 MG/DL (ref 40–60)
HDLC SERPL: 2.7 {RATIO}
HGB BLD-MCNC: 12 G/DL (ref 13.6–17.2)
IMM GRANULOCYTES # BLD: 0 K/UL (ref 0–0.5)
IMM GRANULOCYTES NFR BLD AUTO: 0 % (ref 0–5)
LDLC SERPL CALC-MCNC: 66.2 MG/DL
LIPID PROFILE,FLP: NORMAL
LYMPHOCYTES # BLD: 2.5 K/UL (ref 0.5–4.6)
LYMPHOCYTES NFR BLD: 26 % (ref 13–44)
MAGNESIUM SERPL-MCNC: 1.8 MG/DL (ref 1.8–2.4)
MCH RBC QN AUTO: 28.1 PG (ref 26.1–32.9)
MCHC RBC AUTO-ENTMCNC: 32.8 G/DL (ref 31.4–35)
MCV RBC AUTO: 85.7 FL (ref 79.6–97.8)
MONOCYTES # BLD: 0.7 K/UL (ref 0.1–1.3)
MONOCYTES NFR BLD: 8 % (ref 4–12)
NEUTS SEG # BLD: 5.3 K/UL (ref 1.7–8.2)
NEUTS SEG NFR BLD: 57 % (ref 43–78)
PHOSPHATE SERPL-MCNC: 3 MG/DL (ref 2.3–3.7)
PLATELET # BLD AUTO: 196 K/UL (ref 150–450)
PMV BLD AUTO: 10.7 FL (ref 10.8–14.1)
POTASSIUM SERPL-SCNC: 3.6 MMOL/L (ref 3.5–5.1)
RBC # BLD AUTO: 4.27 M/UL (ref 4.23–5.67)
SODIUM SERPL-SCNC: 142 MMOL/L (ref 136–145)
TRIGL SERPL-MCNC: 89 MG/DL (ref 35–150)
TROPONIN I SERPL-MCNC: 0.02 NG/ML (ref 0.02–0.05)
VLDLC SERPL CALC-MCNC: 17.8 MG/DL (ref 6–23)
WBC # BLD AUTO: 9.5 K/UL (ref 4.3–11.1)

## 2018-03-27 PROCEDURE — 84484 ASSAY OF TROPONIN QUANT: CPT | Performed by: INTERNAL MEDICINE

## 2018-03-27 PROCEDURE — 85025 COMPLETE CBC W/AUTO DIFF WBC: CPT | Performed by: INTERNAL MEDICINE

## 2018-03-27 PROCEDURE — 74011000302 HC RX REV CODE- 302: Performed by: INTERNAL MEDICINE

## 2018-03-27 PROCEDURE — 99218 HC RM OBSERVATION: CPT

## 2018-03-27 PROCEDURE — 83036 HEMOGLOBIN GLYCOSYLATED A1C: CPT | Performed by: INTERNAL MEDICINE

## 2018-03-27 PROCEDURE — 74011250637 HC RX REV CODE- 250/637: Performed by: INTERNAL MEDICINE

## 2018-03-27 PROCEDURE — G8978 MOBILITY CURRENT STATUS: HCPCS

## 2018-03-27 PROCEDURE — 80061 LIPID PANEL: CPT | Performed by: INTERNAL MEDICINE

## 2018-03-27 PROCEDURE — G8997 SWALLOW GOAL STATUS: HCPCS

## 2018-03-27 PROCEDURE — 86580 TB INTRADERMAL TEST: CPT | Performed by: INTERNAL MEDICINE

## 2018-03-27 PROCEDURE — 99221 1ST HOSP IP/OBS SF/LOW 40: CPT | Performed by: PHYSICAL MEDICINE & REHABILITATION

## 2018-03-27 PROCEDURE — 84100 ASSAY OF PHOSPHORUS: CPT | Performed by: INTERNAL MEDICINE

## 2018-03-27 PROCEDURE — 80048 BASIC METABOLIC PNL TOTAL CA: CPT | Performed by: INTERNAL MEDICINE

## 2018-03-27 PROCEDURE — 93880 EXTRACRANIAL BILAT STUDY: CPT

## 2018-03-27 PROCEDURE — 74011250636 HC RX REV CODE- 250/636: Performed by: INTERNAL MEDICINE

## 2018-03-27 PROCEDURE — 92610 EVALUATE SWALLOWING FUNCTION: CPT

## 2018-03-27 PROCEDURE — G8996 SWALLOW CURRENT STATUS: HCPCS

## 2018-03-27 PROCEDURE — 97162 PT EVAL MOD COMPLEX 30 MIN: CPT

## 2018-03-27 PROCEDURE — 77030019605

## 2018-03-27 PROCEDURE — G8998 SWALLOW D/C STATUS: HCPCS

## 2018-03-27 PROCEDURE — 36415 COLL VENOUS BLD VENIPUNCTURE: CPT | Performed by: INTERNAL MEDICINE

## 2018-03-27 PROCEDURE — G8979 MOBILITY GOAL STATUS: HCPCS

## 2018-03-27 PROCEDURE — 83735 ASSAY OF MAGNESIUM: CPT | Performed by: INTERNAL MEDICINE

## 2018-03-27 RX ORDER — SODIUM CHLORIDE 0.9 % (FLUSH) 0.9 %
10 SYRINGE (ML) INJECTION
Status: ACTIVE | OUTPATIENT
Start: 2018-03-27 | End: 2018-03-28

## 2018-03-27 RX ADMIN — HEPARIN SODIUM 5000 UNITS: 5000 INJECTION, SOLUTION INTRAVENOUS; SUBCUTANEOUS at 23:18

## 2018-03-27 RX ADMIN — ACETAMINOPHEN 650 MG: 325 TABLET ORAL at 01:14

## 2018-03-27 RX ADMIN — SODIUM CHLORIDE 100 ML/HR: 900 INJECTION, SOLUTION INTRAVENOUS at 10:31

## 2018-03-27 RX ADMIN — NIACIN 1000 MG: 500 TABLET, EXTENDED RELEASE ORAL at 09:43

## 2018-03-27 RX ADMIN — ESCITALOPRAM OXALATE 10 MG: 10 TABLET ORAL at 09:43

## 2018-03-27 RX ADMIN — PANTOPRAZOLE SODIUM 40 MG: 40 TABLET, DELAYED RELEASE ORAL at 05:10

## 2018-03-27 RX ADMIN — HYDRALAZINE HYDROCHLORIDE 10 MG: 20 INJECTION INTRAMUSCULAR; INTRAVENOUS at 00:53

## 2018-03-27 RX ADMIN — Medication 10 ML: at 16:58

## 2018-03-27 RX ADMIN — PRAVASTATIN SODIUM 80 MG: 80 TABLET ORAL at 23:26

## 2018-03-27 RX ADMIN — HEPARIN SODIUM 5000 UNITS: 5000 INJECTION, SOLUTION INTRAVENOUS; SUBCUTANEOUS at 16:57

## 2018-03-27 RX ADMIN — CARVEDILOL 6.25 MG: 6.25 TABLET, FILM COATED ORAL at 09:43

## 2018-03-27 RX ADMIN — TUBERCULIN PURIFIED PROTEIN DERIVATIVE 5 UNITS: 5 INJECTION, SOLUTION INTRADERMAL at 00:58

## 2018-03-27 RX ADMIN — CLOPIDOGREL BISULFATE 75 MG: 75 TABLET ORAL at 09:43

## 2018-03-27 RX ADMIN — LEVOTHYROXINE SODIUM 150 MCG: 150 TABLET ORAL at 05:11

## 2018-03-27 RX ADMIN — HEPARIN SODIUM 5000 UNITS: 5000 INJECTION, SOLUTION INTRAVENOUS; SUBCUTANEOUS at 05:06

## 2018-03-27 RX ADMIN — ASPIRIN 325 MG ORAL TABLET 325 MG: 325 PILL ORAL at 09:44

## 2018-03-27 NOTE — CONSULTS
PM&R Rehab Consult    Subjective:     Date of Consultation:  March 27, 2018    Referring Physician: Dr. Martha Staples    Patient is a 80 y.o. male who is being seen for a PM&R assessment after admission under the CVA protocol; currently on Observation Status    Principal Problem:    TIA (transient ischemic attack) (3/26/2018)    Active Problems:    HTN (hypertension), malignant (3/26/2018)      Acute cystitis without hematuria (3/26/2018)    HPI; Mr. Art Joshi is an 79yo right handed, functionally debilitated gentleman with a PMH of HTN, hypothyroidism ,CVA x 3, CAD s/p CABG who presented to the ED with an acute onset of confusion, difficulty speaking and decreased strength in the RUE. Family reported that these symptoms resolved within 30 min of onset. EMS noted an irreg heartbeat intermittently in route. In the ED , he was in NSR. He doesn't have a previous hx of afib/flutter; however, a flutter is mentioned in the system as being part of his PMH and he had an ablation in 2014 for this. Head CT was neg. CT cerv spine showed DDD but nothing acute. He did report urinary hesitancy and dysuria. He has had UTIs before. UA was positive for bacteria and WBCs and he has been started on Rocephin. His carotid duplex shows critical stenosis of the Left ICA at 70 -99% . (followed at [facility] in the past by Dr Gisselle Castaneda of vascular surgery s/p Right CEA)  CTA is pending results to confirm. MRI of the brain is pending as well. Pt is on full dose ASA, Plavix  and Pravachol /Niacin too. He previously has had a CVA in 2013 and multiple hospitalizations since. He has had several falls for the past few weeks. He normally ambulates with a RW and required assistance with ADLs. Currently, he is min/mod A with bed mobility, min A with STS and ambulated 25ft with a slow shuffling gait using his RW with Moderate assistance. OT pending. He has no swallowing deficits but does have noted cognitive deficits which he masks with humor.      Past Medical History:   Diagnosis Date    Arthritis     Asthma     as a kid. none recent    Atrial flutter with rapid ventricular response ([address] 6/18/2014    1. Diagnosed 6/18/14: s/p ablation. 2. Echo (6/19/14): EF 45-50%. Moderate mitral regurgitation. 3. Ecardio (8/12-8/25/14): No atrial arrhythmias.  Basal cell carcinoma of skin of other and unspecified parts of face 2/20/2014    Benign hypertensive heart disease without heart failure 9/29/2015    BPH (benign prostatic hyperplasia) 4/16/2013    CAD (coronary artery disease)     CABG, cardiac cath , stents times 2    Calculus of kidney 2/20/2014    Cancer [facility])     Carotid artery stenosis with cerebral infarction ([address] 9/29/2015    Followed by Dr. Mary Song.  Chest pain, unspecified 12/11/2013    CVA (cerebral vascular accident) ([address] 4/16/2013    Diarrhea following gastrointestinal surgery 4/16/2013    Diverticulitis colectomy    Dizziness 6/18/2014    Generalized weakness     GERD (gastroesophageal reflux disease)     Gout 4/16/2013    Heart disease, unspecified 2/20/2014    History of basal cell cancer nose    History of CVA (cerebrovascular accident) 6/18/2014    History of mini-strokes.      History of kidney stones 2006    Hyperlipidemia 4/16/2013    Hypertension     Hypertrophy of prostate with urinary obstruction and other lower urinary tract symptoms (LUTS) 2/20/2014    Hypothyroidism 4/16/2013    Incomplete bladder emptying 2/20/2014    Iron deficiency anemia 4/16/2013    NSTEMI (non-ST elevated myocardial infarction) ([address] 12/12/2013    Osteoarthritis 4/16/2013    Platelet inhibition due to Plavix     Pure hypercholesterolemia 2/20/2014    Stroke [facility])     x3 with the last one being 2/12    Unspecified adverse effect of anesthesia     state he woke up during heart surgery    Unspecified asthma 2/20/2014    Unspecified disorder of thyroid 2/20/2014    Urinary frequency 2/20/2014    Urinary tract infection, site not specified 11/20/2013      Family History   Problem Relation Age of Onset    Heart Disease Father     Heart Attack Father     Stroke Sister     Cancer Sister      brain    Heart Disease Mother     Bleeding Prob Mother 80     breast cancer      Social History   Substance Use Topics    Smoking status: Former Smoker     Types: Cigars     Quit date: 1/1/2002    Smokeless tobacco: Never Used      Comment: quit 2002- cigars    Alcohol use No      Comment: rare   Home Environment: Private residence  # Steps to Enter: 0  One/Two Story Residence: One story  Living Alone: No  Support Systems: Spouse/Significant Other/Partner, Child(nigel)  Patient Expects to be Discharged to[de-identified] Private residence  Current DME Used/Available at Home: Walker, rolling  Prior Level of Function/Work/Activity:  Patient ambulated with rolling walker, reports increased falls recently. Past Surgical History:   Procedure Laterality Date    CABG, ARTERY-VEIN, FOUR  2005    HX ADENOIDECTOMY      HX APPENDECTOMY  age 15    HX CAROTID ENDARTERECTOMY Right 2011    HX CATARACT REMOVAL Bilateral 2006    HX CHOLECYSTECTOMY      HX CORONARY STENT PLACEMENT      HX OTHER SURGICAL  3/12/14    Plasma Button procedure of prostate    HX SMALL BOWEL RESECTION      HX TONSILLECTOMY      CT LEFT HEART CATH,PERCUTANEOUS  12/12/2013    bmsx2 svg to !st obtuse marginal    REMOVAL OF KIDNEY STONE  05/2016    TOTAL KNEE ARTHROPLASTY  2007    right    TOTAL KNEE ARTHROPLASTY  2012    left      Prior to Admission medications    Medication Sig Start Date End Date Taking? Authorizing Provider   escitalopram oxalate (LEXAPRO) 10 mg tablet TAKE 1 TABLET BY MOUTH EVERY MORNING. 12/12/17   Tamiko Anderson MD   pantoprazole (PROTONIX) 40 mg tablet TAKE 1 TABLET BY MOUTH DAILY. 12/12/17   Tamiko Anderson MD   simvastatin (ZOCOR) 20 mg tablet TAKE 1 TAB BY MOUTH NIGHTLY.  12/12/17   Tamiko Anderson MD   niacin (NIASPAN) 1,000 mg Tb24 tab TAKE 1 TAB BY MOUTH NIGHTLY. 17   Hanane Lal MD   levothyroxine (SYNTHROID) 150 mcg tablet TAKE 1 TABLET EVERY DAY BEFORE BREAKFAST 17   Hanane Lal MD   carvedilol (COREG) 3.125 mg tablet TAKE 1 TABLET TWICE DAILY. 17   Hanane Lal MD   clopidogrel (PLAVIX) 75 mg tab Take 1 Tab by mouth daily. 17   Ene Toth MD   potassium citrate (UROCIT-K10) 10 mEq (1,080 mg) TbER TAKE 2 TABLETS TWICE DAILY 3/29/17   Ghazala Anglin MD   nitroglycerin (NITROSTAT) 0.4 mg SL tablet by SubLINGual route every five (5) minutes as needed for Chest Pain. Historical Provider   aspirin delayed-release 81 mg tablet Take 81 mg by mouth daily. Historical Provider   ferrous sulfate (IRON) 325 mg (65 mg iron) tablet Take 65 mg by mouth Daily (before breakfast). Historical Provider     No Known Allergies     Review of Systems:  A comprehensive review of systems was negative except for: Constitutional: positive for fatigue  Cardiovascular: positive for irregular heart beats  Gastrointestinal: positive for reflux symptoms  Genitourinary: positive for frequency, dysuria and hesitancy  Musculoskeletal: positive for neck pain and muscle weakness    Objective:     Vitals:  Blood pressure 166/84, pulse 68, temperature 97.8 °F (36.6 °C), resp. rate 18, height 6' 1\" (1.854 m), weight 200 lb (90.7 kg), SpO2 98 %. Temp (24hrs), Av °F (36.7 °C), Min:97.6 °F (36.4 °C), Max:98.7 °F (37.1 °C)      Intake and Output:   1901 -  0700  In: -   Out: 175 [Urine:175]    Physical Exam:  General:  Alert, oriented and mood affect appropriate; ? Confusion; masks questions with humor and never directly answer some questions   Lungs:   Clear to auscultation bilaterally. Heart:  irreg, S1, S2 stable, no murmur, click, rub or gallop. Abdomen:   Soft, non-tender. Bowel sounds present. No masses,  No organomegaly.    Genitourinary: Continent and PVR is stable   Neuro Muscular: Generalized weakness, prox>distal  Sensation intact, no tremor or ataxia   Skin:  No rashes, lesions, or signs/symptoms or infection. No edema       Labs/Studies:  Recent Results (from the past 72 hour(s))   CBC WITH AUTOMATED DIFF    Collection Time: 03/26/18  3:45 PM   Result Value Ref Range    WBC 10.3 4.3 - 11.1 K/uL    RBC 4.40 4.23 - 5.67 M/uL    HGB 12.5 (L) 13.6 - 17.2 g/dL    HCT 38.0 (L) 41.1 - 50.3 %    MCV 86.4 79.6 - 97.8 FL    MCH 28.4 26.1 - 32.9 PG    MCHC 32.9 31.4 - 35.0 g/dL    RDW 13.8 11.9 - 14.6 %    PLATELET 902 764 - 677 K/uL    MPV 10.8 10.8 - 14.1 FL    DF AUTOMATED      NEUTROPHILS 63 43 - 78 %    LYMPHOCYTES 22 13 - 44 %    MONOCYTES 6 4.0 - 12.0 %    EOSINOPHILS 9 (H) 0.5 - 7.8 %    BASOPHILS 0 0.0 - 2.0 %    IMMATURE GRANULOCYTES 0 0.0 - 5.0 %    ABS. NEUTROPHILS 6.4 1.7 - 8.2 K/UL    ABS. LYMPHOCYTES 2.3 0.5 - 4.6 K/UL    ABS. MONOCYTES 0.7 0.1 - 1.3 K/UL    ABS. EOSINOPHILS 0.9 (H) 0.0 - 0.8 K/UL    ABS. BASOPHILS 0.0 0.0 - 0.2 K/UL    ABS. IMM. GRANS. 0.0 0.0 - 0.5 K/UL   METABOLIC PANEL, COMPREHENSIVE    Collection Time: 03/26/18  3:45 PM   Result Value Ref Range    Sodium 142 136 - 145 mmol/L    Potassium 4.5 3.5 - 5.1 mmol/L    Chloride 108 (H) 98 - 107 mmol/L    CO2 29 21 - 32 mmol/L    Anion gap 5 (L) 7 - 16 mmol/L    Glucose 120 (H) 65 - 100 mg/dL    BUN 27 (H) 8 - 23 MG/DL    Creatinine 1.87 (H) 0.8 - 1.5 MG/DL    GFR est AA 45 (L) >60 ml/min/1.73m2    GFR est non-AA 37 (L) >60 ml/min/1.73m2    Calcium 9.2 8.3 - 10.4 MG/DL    Bilirubin, total 0.3 0.2 - 1.1 MG/DL    ALT (SGPT) 14 12 - 65 U/L    AST (SGOT) 13 (L) 15 - 37 U/L    Alk.  phosphatase 77 50 - 136 U/L    Protein, total 7.0 6.3 - 8.2 g/dL    Albumin 3.1 (L) 3.2 - 4.6 g/dL    Globulin 3.9 (H) 2.3 - 3.5 g/dL    A-G Ratio 0.8 (L) 1.2 - 3.5     EKG, 12 LEAD, INITIAL    Collection Time: 03/26/18  3:48 PM   Result Value Ref Range    Ventricular Rate 67 BPM    Atrial Rate 68 BPM    P-R Interval 204 ms    QRS Duration 166 ms    Q-T Interval 446 ms QTC Calculation (Bezet) 471 ms    Calculated P Axis 46 degrees    Calculated R Axis -72 degrees    Calculated T Axis 89 degrees    Diagnosis       !! AGE AND GENDER SPECIFIC ECG ANALYSIS !! Sinus rhythm with occasional Premature ventricular complexes  Right bundle branch block  Left anterior fascicular block  !!! Bifascicular block !!!   Left ventricular hypertrophy with QRS widening and repolarization abnormality  Abnormal ECG  When compared with ECG of 19-JUN-2014 15:36,  Premature ventricular complexes are now Present  Nonspecific T wave abnormality no longer evident in Inferior leads  T wave inversion less evident in Anterolateral leads  Confirmed by ST ROMINA JONES MD (), BOB OCAMPO ([id number]) on 3/26/2018 7:44:59 PM     POC LACTIC ACID    Collection Time: 03/26/18  4:06 PM   Result Value Ref Range    Lactic Acid (POC) 1.3 0.5 - 1.9 mmol/L   URINE MICROSCOPIC    Collection Time: 03/26/18  4:27 PM   Result Value Ref Range    WBC >100 (H) 0 /hpf    RBC 10-20 0 /hpf    Epithelial cells 5-10 0 /hpf    Bacteria 4+ (H) 0 /hpf    Casts 3-5 0 /lpf   TROPONIN I    Collection Time: 03/26/18  4:45 PM   Result Value Ref Range    Troponin-I, Qt. <0.02 (L) 0.02 - 0.05 NG/ML   PROCALCITONIN    Collection Time: 03/26/18  8:00 PM   Result Value Ref Range    Procalcitonin <0.1 ng/mL   TSH 3RD GENERATION    Collection Time: 03/26/18  8:00 PM   Result Value Ref Range    TSH 4.860 (H) 0.358 - 3.740 uIU/mL   CULTURE, URINE    Collection Time: 03/26/18  9:09 PM   Result Value Ref Range    Special Requests: NO SPECIAL REQUESTS      Culture result: (A)       >100,000 COLONIES/mL GRAM NEGATIVE RODS SUBCULTURE IN PROGRESS   LIPID PANEL    Collection Time: 03/27/18  5:35 AM   Result Value Ref Range    LIPID PROFILE          Cholesterol, total 133 <200 MG/DL    Triglyceride 89 35 - 150 MG/DL    HDL Cholesterol 49 40 - 60 MG/DL    LDL, calculated 66.2 <100 MG/DL    VLDL, calculated 17.8 6.0 - 23.0 MG/DL    CHOL/HDL Ratio 2.7     HEMOGLOBIN A1C WITH EAG    Collection Time: 03/27/18  5:35 AM   Result Value Ref Range    Hemoglobin A1c 5.7 4.8 - 6.0 %    Est. average glucose 117 mg/dL   CBC WITH AUTOMATED DIFF    Collection Time: 03/27/18  5:35 AM   Result Value Ref Range    WBC 9.5 4.3 - 11.1 K/uL    RBC 4.27 4.23 - 5.67 M/uL    HGB 12.0 (L) 13.6 - 17.2 g/dL    HCT 36.6 (L) 41.1 - 50.3 %    MCV 85.7 79.6 - 97.8 FL    MCH 28.1 26.1 - 32.9 PG    MCHC 32.8 31.4 - 35.0 g/dL    RDW 13.9 11.9 - 14.6 %    PLATELET 668 653 - 295 K/uL    MPV 10.7 (L) 10.8 - 14.1 FL    DF AUTOMATED      NEUTROPHILS 57 43 - 78 %    LYMPHOCYTES 26 13 - 44 %    MONOCYTES 8 4.0 - 12.0 %    EOSINOPHILS 9 (H) 0.5 - 7.8 %    BASOPHILS 0 0.0 - 2.0 %    IMMATURE GRANULOCYTES 0 0.0 - 5.0 %    ABS. NEUTROPHILS 5.3 1.7 - 8.2 K/UL    ABS. LYMPHOCYTES 2.5 0.5 - 4.6 K/UL    ABS. MONOCYTES 0.7 0.1 - 1.3 K/UL    ABS. EOSINOPHILS 0.9 (H) 0.0 - 0.8 K/UL    ABS. BASOPHILS 0.0 0.0 - 0.2 K/UL    ABS. IMM.  GRANS. 0.0 0.0 - 0.5 K/UL   METABOLIC PANEL, BASIC    Collection Time: 03/27/18  5:35 AM   Result Value Ref Range    Sodium 142 136 - 145 mmol/L    Potassium 3.6 3.5 - 5.1 mmol/L    Chloride 109 (H) 98 - 107 mmol/L    CO2 24 21 - 32 mmol/L    Anion gap 9 7 - 16 mmol/L    Glucose 86 65 - 100 mg/dL    BUN 26 (H) 8 - 23 MG/DL    Creatinine 1.57 (H) 0.8 - 1.5 MG/DL    GFR est AA 55 (L) >60 ml/min/1.73m2    GFR est non-AA 45 (L) >60 ml/min/1.73m2    Calcium 8.6 8.3 - 10.4 MG/DL   MAGNESIUM    Collection Time: 03/27/18  5:35 AM   Result Value Ref Range    Magnesium 1.8 1.8 - 2.4 mg/dL   PHOSPHORUS    Collection Time: 03/27/18  5:35 AM   Result Value Ref Range    Phosphorus 3.0 2.3 - 3.7 MG/DL   TROPONIN I    Collection Time: 03/27/18  5:35 AM   Result Value Ref Range    Troponin-I, Qt. 0.02 0.02 - 0.05 NG/ML       Functional Assessment:  Functional Assessment  Decline in Gait/Transfer/Balance: Yes (comment)  Decline in Capacity to Feed/Dress/Bathe: No  Developmental Delay: No  Chewing/Swallowing Problems: No  Difficulty with Secretions: No  Speech Slurred/Thick/Garbled: No     Speech Assessment:    Dysphagia Screening  Vocal Quality/Secretions: Normal  History of Dysphagia: No  O2 Saturation: Normal  Alertness: Normal  Pre-Swallow Assessment Score: 0  Purees: No difficulty noted  Water by Cup: No difficulty noted  Water by Straw: No difficulty noted  Aspiration Signs/Symptoms: None             Ambulation:  Activity and Safety  Activity Level: Up with Assistance  Activity: In bed, Family/Visitors present  Activity Assistance: Partial (two people)  Weight Bearing Status: WBAT (Weight Bearing as Tolerated)  Mode of Transportation: Stretcher  Repositioned: Head of bed elevated (degrees)  Patient Turned: Turns self  Head of Bed Elevated: Self regulated  Assistive Device: Fall prevention device  Safety Measures: Bed/Chair alarm on, Bed/Chair-Wheels locked, Bed in low position, Call light within reach, Fall prevention (comment), Gripper socks, Side rails X2     Impression/Plan:     Principal Problem:    TIA (transient ischemic attack) (3/26/2018)    Active Problems:    HTN (hypertension), malignant (3/26/2018)      Acute cystitis without hematuria (3/26/2018)    L carotid stenosis with previous CVA \"without residual deficits\" ; very debilitated  Admission for TIA/ + UTI    Recommendations: Continue Acute Rehab Program  Coordination of rehab/medical care  Counseling of PM & R care issues management  Monitoring and management of medical conditions per plan of care/orders   -per family, pts wife assists patient with \"everything. \"  He performs ADLs with assistance. He is very sedentary at home. Awaiting MRI results. UTI could have contributed to presentation as well.  However, what is described by family sounds like expressive aphasia  -will likely dc home with [facility] per family request.  Discussion with Family/Caregiver/Staff  Reviewed Therapies/Labs/Meds/Records    Signed By:  Jamal Bonilla MD     March 27, 2018

## 2018-03-27 NOTE — ED NOTES
TRANSFER - OUT REPORT:    Verbal report given to Essence Boudreaux  being transferred to 2(unit) for routine progression of care       Report consisted of patients Situation, Background, Assessment and   Recommendations(SBAR). Information from the following report(s) ED Summary was reviewed with the receiving nurse. Lines:   Peripheral IV 03/26/18 Left Forearm (Active)   Site Assessment Clean, dry, & intact 3/26/2018  4:04 PM   Phlebitis Assessment 0 3/26/2018  4:04 PM   Infiltration Assessment 0 3/26/2018  4:04 PM       Peripheral IV 03/26/18 Left Hand (Active)   Site Assessment Clean, dry, & intact 3/26/2018  8:15 PM   Phlebitis Assessment 0 3/26/2018  8:15 PM   Infiltration Assessment 0 3/26/2018  8:15 PM        Opportunity for questions and clarification was provided.             \

## 2018-03-27 NOTE — H&P
History and Physical    Subjective:     Connee Aase is an 80 y.o. yo WM, with a pmh of HTN, CVA, CAD status post CABG and hypothyroidism, who presents to the ED via EMS for acute onset confusion, difficulty speaking and decreased  of his right hand. These symptoms resolved after about thirty minutes. Per EMS he had a run of an irregular heartbeat en route but is currently regular. States he does not have a known history of fib/flutter but a flutter is listed in his pmh. CT head and CT cerv spine neg for acute abnormality in ED. Does endorse difficulty urinating and frequent burning/infections. UA with > 100 WBCs and 4+ bacteria. Denies sob, chest pain, nausea, fever, chills. Did fall yesterday and has significant swelling and pain to his right knee. Full code  PCP: Maxine Deleon  Next of kin: wife, Angela Rojas. [phone]    Past Medical History:   Diagnosis Date    Arthritis     Asthma     as a kid. none recent    Atrial flutter with rapid ventricular response ([address] 6/18/2014    1. Diagnosed 6/18/14: s/p ablation. 2. Echo (6/19/14): EF 45-50%. Moderate mitral regurgitation. 3. Ecardio (8/12-8/25/14): No atrial arrhythmias. 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)     Carotid artery stenosis with cerebral infarction ([address] 9/29/2015    Followed by Dr. Martha Caballero.     Chest pain, unspecified 12/11/2013    CVA (cerebral vascular accident) ([address] 4/16/2013    Diarrhea following gastrointestinal surgery 4/16/2013    Diverticulitis colectomy    Dizziness 6/18/2014    Generalized weakness     GERD (gastroesophageal reflux disease)     Gout 4/16/2013    Heart disease, unspecified 2/20/2014    History of basal cell cancer nose    History of CVA (cerebrovascular accident) 6/18/2014    History of mini-strokes.      History of kidney stones 2006    Hyperlipidemia 4/16/2013    Hypertension     Hypertrophy of prostate with urinary obstruction and other lower urinary tract symptoms (LUTS) 2/20/2014    Hypothyroidism 4/16/2013    Incomplete bladder emptying 2/20/2014    Iron deficiency anemia 4/16/2013    NSTEMI (non-ST elevated myocardial infarction) ([facility] 12/12/2013    Osteoarthritis 4/16/2013    Platelet inhibition due to Plavix     Pure hypercholesterolemia 2/20/2014    Stroke [facility])     x3 with the last one being 2/12    Unspecified adverse effect of anesthesia     state he woke up during heart surgery    Unspecified asthma 2/20/2014    Unspecified disorder of thyroid 2/20/2014    Urinary frequency 2/20/2014    Urinary tract infection, site not specified 11/20/2013      Past Surgical History:   Procedure Laterality Date    CABG, ARTERY-VEIN, FOUR  2005    HX ADENOIDECTOMY      HX APPENDECTOMY  age 15   Lopez Ban HX CAROTID ENDARTERECTOMY Right 2011    HX CATARACT REMOVAL Bilateral 2006    HX CHOLECYSTECTOMY      HX CORONARY STENT PLACEMENT      HX OTHER SURGICAL  3/12/14    Plasma Button procedure of prostate    HX SMALL BOWEL RESECTION      HX TONSILLECTOMY      [place] LEFT HEART CATH,PERCUTANEOUS  12/12/2013    bmsx2 svg to !st obtuse marginal    REMOVAL OF KIDNEY STONE  05/2016    TOTAL KNEE ARTHROPLASTY  2007    right    TOTAL KNEE ARTHROPLASTY  2012    left     Family History   Problem Relation Age of Onset    Heart Disease Father     Heart Attack Father     Stroke Sister     Cancer Sister      brain    Heart Disease Mother     Bleeding Prob Mother 80     breast cancer      Social History   Substance Use Topics    Smoking status: Former Smoker     Types: Cigars     Quit date: 1/1/2002    Smokeless tobacco: Never Used      Comment: quit 2002- cigars    Alcohol use No      Comment: rare Prior to Admission medications    Medication Sig Start Date End Date Taking? Authorizing Provider   escitalopram oxalate (LEXAPRO) 10 mg tablet TAKE 1 TABLET BY MOUTH EVERY MORNING. 12/12/17   Ugo Kohli MD   pantoprazole (PROTONIX) 40 mg tablet TAKE 1 TABLET BY MOUTH DAILY. 12/12/17   Ugo Kohli MD   simvastatin (ZOCOR) 20 mg tablet TAKE 1 TAB BY MOUTH NIGHTLY. 12/12/17   Ugo Kohli MD   niacin (NIASPAN) 1,000 mg Tb24 tab TAKE 1 TAB BY MOUTH NIGHTLY. 12/12/17   Ugo Kohli MD   levothyroxine (SYNTHROID) 150 mcg tablet TAKE 1 TABLET EVERY DAY BEFORE BREAKFAST 12/12/17   Ugo Kohli MD   carvedilol (COREG) 3.125 mg tablet TAKE 1 TABLET TWICE DAILY. 12/12/17   Ugo Kohli MD   clopidogrel (PLAVIX) 75 mg tab Take 1 Tab by mouth daily. 6/7/17   Bharat Yung MD   potassium citrate (UROCIT-K10) 10 mEq (1,080 mg) TbER TAKE 2 TABLETS TWICE DAILY 3/29/17   Rd Lima MD   nitroglycerin (NITROSTAT) 0.4 mg SL tablet by SubLINGual route every five (5) minutes as needed for Chest Pain. Historical Provider   aspirin delayed-release 81 mg tablet Take 81 mg by mouth daily. Historical Provider   ferrous sulfate (IRON) 325 mg (65 mg iron) tablet Take 65 mg by mouth Daily (before breakfast). Historical Provider     No Known Allergies     Review of Systems:  A comprehensive review of systems was negative except for that written in the History of Present Illness. 3/26/18:  Denies alcohol, tobacco and illicit drug use  Lives with wife and pet dog/cat  No tattoos  No recent foreign travel    Objective:      Intake and Output:            Physical Exam:   General: awake, alert, oriented, cooperative  Eyes; non icteric, DANNA, EOMI  Neck; supple, no rigidity  CV: RRR  Pulm; CTAB  Abd; soft, non tender, active BS, no rebound/guarding  Neuro 5/5 strength of all four extremities, face symmetric, cranial nerves II-XII grossly intact, normal speech  Ext: right knee bruising/swelling with small effusion- not warm, FROM of both knees/legs    Data Review:   Recent Results (from the past 24 hour(s))   CBC WITH AUTOMATED DIFF    Collection Time: 03/26/18  3:45 PM   Result Value Ref Range    WBC 10.3 4.3 - 11.1 K/uL    RBC 4.40 4.23 - 5.67 M/uL    HGB 12.5 (L) 13.6 - 17.2 g/dL    HCT 38.0 (L) 41.1 - 50.3 %    MCV 86.4 79.6 - 97.8 FL    MCH 28.4 26.1 - 32.9 PG    MCHC 32.9 31.4 - 35.0 g/dL    RDW 13.8 11.9 - 14.6 %    PLATELET 830 153 - 942 K/uL    MPV 10.8 10.8 - 14.1 FL    DF AUTOMATED      NEUTROPHILS 63 43 - 78 %    LYMPHOCYTES 22 13 - 44 %    MONOCYTES 6 4.0 - 12.0 %    EOSINOPHILS 9 (H) 0.5 - 7.8 %    BASOPHILS 0 0.0 - 2.0 %    IMMATURE GRANULOCYTES 0 0.0 - 5.0 %    ABS. NEUTROPHILS 6.4 1.7 - 8.2 K/UL    ABS. LYMPHOCYTES 2.3 0.5 - 4.6 K/UL    ABS. MONOCYTES 0.7 0.1 - 1.3 K/UL    ABS. EOSINOPHILS 0.9 (H) 0.0 - 0.8 K/UL    ABS. BASOPHILS 0.0 0.0 - 0.2 K/UL    ABS. IMM. GRANS. 0.0 0.0 - 0.5 K/UL   METABOLIC PANEL, COMPREHENSIVE    Collection Time: 03/26/18  3:45 PM   Result Value Ref Range    Sodium 142 136 - 145 mmol/L    Potassium 4.5 3.5 - 5.1 mmol/L    Chloride 108 (H) 98 - 107 mmol/L    CO2 29 21 - 32 mmol/L    Anion gap 5 (L) 7 - 16 mmol/L    Glucose 120 (H) 65 - 100 mg/dL    BUN 27 (H) 8 - 23 MG/DL    Creatinine 1.87 (H) 0.8 - 1.5 MG/DL    GFR est AA 45 (L) >60 ml/min/1.73m2    GFR est non-AA 37 (L) >60 ml/min/1.73m2    Calcium 9.2 8.3 - 10.4 MG/DL    Bilirubin, total 0.3 0.2 - 1.1 MG/DL    ALT (SGPT) 14 12 - 65 U/L    AST (SGOT) 13 (L) 15 - 37 U/L    Alk.  phosphatase 77 50 - 136 U/L    Protein, total 7.0 6.3 - 8.2 g/dL    Albumin 3.1 (L) 3.2 - 4.6 g/dL    Globulin 3.9 (H) 2.3 - 3.5 g/dL    A-G Ratio 0.8 (L) 1.2 - 3.5     EKG, 12 LEAD, INITIAL    Collection Time: 03/26/18  3:48 PM   Result Value Ref Range    Ventricular Rate 67 BPM    Atrial Rate 68 BPM    P-R Interval 204 ms    QRS Duration 166 ms    Q-T Interval 446 ms    QTC Calculation (Bezet) 471 ms    Calculated P Axis 46 degrees    Calculated R Axis -72 degrees    Calculated T Axis 89 degrees    Diagnosis       !! AGE AND GENDER SPECIFIC ECG ANALYSIS !! Sinus rhythm with occasional Premature ventricular complexes  Right bundle branch block  Left anterior fascicular block  !!! Bifascicular block !!! Left ventricular hypertrophy with QRS widening and repolarization abnormality  Abnormal ECG  When compared with ECG of 19-JUN-2014 15:36,  Premature ventricular complexes are now Present  Nonspecific T wave abnormality no longer evident in Inferior leads  T wave inversion less evident in Anterolateral leads  Confirmed by ST ROMINA JONES MD (), BOB OCAMPO ([id number]) on 3/26/2018 7:44:59 PM     POC LACTIC ACID    Collection Time: 03/26/18  4:06 PM   Result Value Ref Range    Lactic Acid (POC) 1.3 0.5 - 1.9 mmol/L   URINE MICROSCOPIC    Collection Time: 03/26/18  4:27 PM   Result Value Ref Range    WBC >100 (H) 0 /hpf    RBC 10-20 0 /hpf    Epithelial cells 5-10 0 /hpf    Bacteria 4+ (H) 0 /hpf    Casts 3-5 0 /lpf   TROPONIN I    Collection Time: 03/26/18  4:45 PM   Result Value Ref Range    Troponin-I, Qt. <0.02 (L) 0.02 - 0.05 NG/ML       Assessment:     Principal Problem:    TIA (transient ischemic attack) (3/26/2018)    Active Problems:    HTN (hypertension), malignant (3/26/2018)      Acute cystitis without hematuria (3/26/2018)        Plan:     Place on remote telemetry to evaluate for episodes of a fib/flutter. Will need to consider anticoagulation if these are noted. Check MRI brain WO, echo, carotid US. Cardiac diet. Consult pt, ot, st.  Change zocor 20 to pravachol 80 and increase aspirin from 81 to 325 mg for now. Continue plavix. Culture urine, check procalcitonin, /hr, start rocephin. Check TSH. Increase coreg to 6.25 BID for better bp control. Add prn hydralazine. Right knee pain/swelling: check xray    Full code  Anticipated dc: tomorrow if MRI brain neg. Ppx; subq heparin.      Signed By: Rupal Perkins Yenni Bishop MD     March 26, 2018

## 2018-03-27 NOTE — PROGRESS NOTES
CM Specialist Donna Smith met with patient and daughter to discuss admission status. Medicare Outpatient Observation Notice provided to the patient. Oral explanation was provided and all questions answered. Patient daughter signed document and original placed in the medical chart. Copy provided to patient daughter. Patient was alert and oriented. Patient resides with wife in an one level home with one step to the entrance of the side of the home. Patient family lives close by. Patient is dependent with his ALD's and does not drives. Family transport patient to appointments. Patient PCP is Lona Guillory and last visit was approximately a few weeks ago. Patient visits every six months. Patient has no breathing assistive's. Patient has had community services in the past to assist with Rehab services and patient daughter is unknown with company. Patient has no issues affording medications and is not a . Patient daughter is concerned with transportation at d/c due to patients wife somewhat disabled. Patient daughter inquired about Rehab services conducted in home for patient. CM will relate information to RN Case Manager Jaime Weston. CM will continue to follow for d/c planning. .Care Management Interventions  PCP Verified by CM: Yes  Mode of Transport at Discharge:  Other (see comment) (Patient may need aternate transport other than wife at d/c.)  Transition of Care Consult (CM Consult): Discharge Planning  Physical Therapy Consult: Yes  Occupational Therapy Consult: Yes  Current Support Network: Lives with Spouse, Own Home, Family Lives Nearby  Confirm Follow Up Transport: Family  Plan discussed with Pt/Family/Caregiver: Yes  [place] Resource Information Provided?: No

## 2018-03-27 NOTE — PROGRESS NOTES
Hospitalist Progress Note    3/27/2018  Admit Date: 3/26/2018  3:44 PM   NAME: Mook Quinones   :  1935   MRN:  [id number]   Attending: Brigido Parry MD  PCP:  Edilberto Chcias MD    SUBJECTIVE:   Patient presented with confused and dysarthria. CT head neg for intracranial bleeding. MRI camera is broken and pending repair at this time. Symptoms are improving. Pt denies any Ha, nausea, cp, sob      Review of Systems negative with exception of pertinent positives noted above  PHYSICAL EXAM     Visit Vitals    /84 (BP 1 Location: Right arm, BP Patient Position: At rest)    Pulse 68    Temp 97.8 °F (36.6 °C)    Resp 18    Ht 6' 1\" (1.854 m)    Wt 90.7 kg (200 lb)    SpO2 98%    BMI 26.39 kg/m2      Temp (24hrs), Av.8 °F (36.6 °C), Min:97.6 °F (36.4 °C), Max:98.2 °F (36.8 °C)    Oxygen Therapy  O2 Sat (%): 98 % (18 1110)  Pulse via Oximetry: 62 beats per minute (18 1829)  O2 Device: Room air (18 1732)    Intake/Output Summary (Last 24 hours) at 18 1545  Last data filed at 18 0820   Gross per 24 hour   Intake                0 ml   Output              300 ml   Net             -300 ml      General: No acute distress    Lungs:  CTA Bilaterally. Heart:  Regular rate and rhythm,  No murmur, rub, or gallop  Abdomen: Soft, Non distended, Non tender, Positive bowel sounds  Extremities: No cyanosis, clubbing or edema  Neurologic:  No focal deficits    ASSESSMENT      Active Hospital Problems    Diagnosis Date Noted    TIA (transient ischemic attack) 2018    HTN (hypertension), malignant 2018    Acute cystitis without hematuria 2018     Plan:  · Symptoms improving, monitor  · Continue asa, statin  · MRI to be performed tomorrow once camera fixed  · Echo pending, follow results  · Known hx of carotid stenosis. Single kidney and does not wish to pursue cta.   Follows with vascular  · PT/OT    DVT Prophylaxis: heparin    Signed By: Romaine Noel Roberto [place], MD     March 27, 2018

## 2018-03-27 NOTE — PROGRESS NOTES
Problem: Falls - Risk of  Goal: *Absence of Falls  Document Christal Fall Risk and appropriate interventions in the flowsheet.    Outcome: Progressing Towards Goal  Fall Risk Interventions:  Mobility Interventions: Bed/chair exit alarm              Elimination Interventions: Call light in reach, Bed/chair exit alarm, Urinal in reach    History of Falls Interventions: Bed/chair exit alarm

## 2018-03-27 NOTE — PROGRESS NOTES
Problem: Nutrition Deficit  Goal: *Optimize nutritional status  Acknowledge consult received for \"other\" as the patient is admitted for CVA work-up. Patient is now receiving an oral diet. Note that patient's Malnutrition Screening Tool did not trigger assessment and nutrition assessment is deferred at this time. RD to f/u per standards of care or MD consult if prior.     Kristen Olivier [address], [address] [place] [address], [phone]

## 2018-03-27 NOTE — PROGRESS NOTES
Physical Therapy Note:    Orders received, chart reviewed and physical therapy evaluation attempted. Patient off the floor for testing. Will attempt as patient is available and schedule allows.     Thank you,  PILY MaeT

## 2018-03-27 NOTE — PROGRESS NOTES
3/27/2018  Attempted to see patient for 2nd time this afternoon for OT evaluation, however pt states he is tired from multiple tests today and politely requests that therapy re-attempt tomorrow.    Thank you,  Maribeth Gustafson, OT

## 2018-03-27 NOTE — DISCHARGE INSTRUCTIONS
Transient Ischemic Attack: Care Instructions  Your Care Instructions    A transient ischemic attack (TIA) is when blood flow to a part of your brain is blocked for a short time. A TIA is like a stroke but usually lasts only a few minutes. A TIA does not cause lasting brain damage. Any vision problems, slurred speech, or other symptoms usually go away in 10 to 20 minutes. But they may last for up to 24 hours. TIAs are often warning signs of a stroke. Some people who have a TIA may have a stroke in the future. A stroke can cause symptoms like those of a TIA. But a stroke causes lasting damage to your brain. You can take steps to help prevent a stroke. One thing you can do is get early treatment. If you have other new symptoms, or if your symptoms do not get better, go back to the emergency room or call your doctor right away. Getting treatment right away may prevent long-term brain damage caused by a stroke. The doctor has checked you carefully, but problems can develop later. If you notice any problems or new symptoms, get medical treatment right away. Follow-up care is a key part of your treatment and safety. Be sure to make and go to all appointments, and call your doctor if you are having problems. It's also a good idea to know your test results and keep a list of the medicines you take. How can you care for yourself at home? Medicines  ? · Be safe with medicines. Take your medicines exactly as prescribed. Call your doctor if you think you are having a problem with your medicine. ? · If you take a blood thinner, such as aspirin, be sure you get instructions about how to take your medicine safely. Blood thinners can cause serious bleeding problems. ? · Call your doctor if you are not able to take your medicines for any reason.    ? · Do not take any over-the-counter medicines or herbal products without talking to your doctor first.   ? · If you take birth control pills or hormone therapy, talk to your doctor. Ask if these treatments are right for you. ? Lifestyle changes  ? · Do not smoke. If you need help quitting, talk to your doctor about stop-smoking programs and medicines. ? · Be active. If your doctor recommends it, get more exercise. Walking is a good choice. Bit by bit, increase the amount you walk every day. Try for at least 30 minutes on most days of the week. You also may want to swim, bike, or do other activities. ? · Eat heart-healthy foods. These include fruits, vegetables, high-fiber foods, fish, and foods that are low in sodium, saturated fat, and trans fat. ? · Stay at a healthy weight. Lose weight if you need to.   ? · Limit alcohol to 2 drinks a day for men and 1 drink a day for women. ?Staying healthy  ? · Manage other health problems such as diabetes, high blood pressure, and high cholesterol. ? · Get the flu vaccine every year. When should you call for help? Call 911 anytime you think you may need emergency care. For example, call if:  ? · You have new or worse symptoms of a stroke. These may include:  ¨ Sudden numbness, tingling, weakness, or loss of movement in your face, arm, or leg, especially on only one side of your body. ¨ Sudden vision changes. ¨ Sudden trouble speaking. ¨ Sudden confusion or trouble understanding simple statements. ¨ Sudden problems with walking or balance. ¨ A sudden, severe headache that is different from past headaches. Call 911 even if these symptoms go away in a few minutes. ? · You feel like you are having another TIA. ? Watch closely for changes in your health, and be sure to contact your doctor if you have any problems. Where can you learn more? Go to http://vazquez-glory.info/. Enter [phone] in the search box to learn more about \"Transient Ischemic Attack: Care Instructions. \"  Current as of: March 20, 2017  Content Version: 11.4  © 8215-7759 Healthwise, [employer].  Care instructions adapted under license by [employer] (which disclaims liability or warranty for this information). If you have questions about a medical condition or this instruction, always ask your healthcare professional. [address] any warranty or liability for your use of this information. DISCHARGE SUMMARY from Nurse    PATIENT INSTRUCTIONS:    After general anesthesia or intravenous sedation, for 24 hours or while taking prescription Narcotics:  · Limit your activities  · Do not drive and operate hazardous machinery  · Do not make important personal or business decisions  · Do  not drink alcoholic beverages  · If you have not urinated within 8 hours after discharge, please contact your surgeon on call. Report the following to your surgeon:  · Excessive pain, swelling, redness or odor of or around the surgical area  · Temperature over 100.5  · Nausea and vomiting lasting longer than 4 hours or if unable to take medications  · Any signs of decreased circulation or nerve impairment to extremity: change in color, persistent  numbness, tingling, coldness or increase pain  · Any questions    What to do at Home:  Recommended activity: Activity as tolerated,     If you experience any of the following symptoms se discharge instructions, please follow up with primary care. *  Please give a list of your current medications to your Primary Care Provider. *  Please update this list whenever your medications are discontinued, doses are      changed, or new medications (including over-the-counter products) are added. *  Please carry medication information at all times in case of emergency situations. These are general instructions for a healthy lifestyle:    No smoking/ No tobacco products/ Avoid exposure to second hand smoke  Surgeon General's Warning:  Quitting smoking now greatly reduces serious risk to your health.     Obesity, smoking, and sedentary lifestyle greatly increases your risk for illness    A healthy diet, regular physical exercise & weight monitoring are important for maintaining a healthy lifestyle    You may be retaining fluid if you have a history of heart failure or if you experience any of the following symptoms:  Weight gain of 3 pounds or more overnight or 5 pounds in a week, increased swelling in our hands or feet or shortness of breath while lying flat in bed. Please call your doctor as soon as you notice any of these symptoms; do not wait until your next office visit. Recognize signs and symptoms of STROKE:    F-face looks uneven    A-arms unable to move or move unevenly    S-speech slurred or non-existent    T-time-call 911 as soon as signs and symptoms begin-DO NOT go       Back to bed or wait to see if you get better-TIME IS BRAIN. Warning Signs of HEART ATTACK     Call 911 if you have these symptoms:   Chest discomfort. Most heart attacks involve discomfort in the center of the chest that lasts more than a few minutes, or that goes away and comes back. It can feel like uncomfortable pressure, squeezing, fullness, or pain.  Discomfort in other areas of the upper body. Symptoms can include pain or discomfort in one or both arms, the back, neck, jaw, or stomach.  Shortness of breath with or without chest discomfort.  Other signs may include breaking out in a cold sweat, nausea, or lightheadedness. Don't wait more than five minutes to call 911 - MINUTES MATTER! Fast action can save your life. Calling 911 is almost always the fastest way to get lifesaving treatment. Emergency Medical Services staff can begin treatment when they arrive -- up to an hour sooner than if someone gets to the hospital by car. The discharge information has been reviewed with the patient. The patient verbalized understanding.   Discharge medications reviewed with the patient and appropriate educational materials and side effects teaching were provided.   ___________________________________________________________________________________________________________________________________

## 2018-03-27 NOTE — PROGRESS NOTES
Problem: Mobility Impaired (Adult and Pediatric)  Goal: *Acute Goals and Plan of Care (Insert Text)  STG:  (1.)Mr. Cydney Fernandez will move from supine to sit and sit to supine , scoot up and down and roll side to side with CONTACT GUARD ASSIST within 3 treatment day(s). (2.)Mr. Cydney Fernandez will transfer from bed to chair and chair to bed with MINIMAL ASSIST using the least restrictive device within 3 treatment day(s). (3.)Mr. Cydney Fernandez will ambulate with MINIMAL ASSIST for 50 feet with the least restrictive device within 3 treatment day(s). (4.)Mr. Cydney Fernandez will perform standing static and dynamic balance activities x 15 minutes with MINIMAL ASSIST to improve safety within 3 day(s). (5.)Mr. Cydney Fernandez will perform LE exercises with 1 to 2 cues for form within 3 days to improve strength for functional transfers and ambulation. LTG:  (1.)Mr. Cydney Fernandez will move from supine to sit and sit to supine , scoot up and down and roll side to side in bed with SUPERVISION within 7 treatment day(s). (2.)Mr. Cydney Fernandez will transfer from bed to chair and chair to bed with STAND BY ASSIST using the least restrictive device within 7 treatment day(s). (3.)Mr. Cydney Fernandez will ambulate with STAND BY ASSIST for 100 feet with the least restrictive device within 7 treatment day(s). (4.)Mr. Cydney Fernandez will perform standing static and dynamic balance activities x 25 minutes with STAND BY ASSIST to improve safety within 7 day(s).   ________________________________________________________________________________________________      PHYSICAL THERAPY: Initial Assessment, AM 3/27/2018  OBSERVATION: Hospital Day: 2  [place] / [place] / Product Type: Medicare /      NAME/AGE/GENDER: Emilie Watkins is a 80 y.o. male   PRIMARY DIAGNOSIS: TIA (transient ischemic attack) TIA (transient ischemic attack) TIA (transient ischemic attack)        ICD-10: Treatment Diagnosis:    · Difficulty in walking, Not elsewhere classified (R26.2) Precaution/Allergies:  Review of patient's allergies indicates no known allergies. ASSESSMENT:     Mr. Pete Rollins is a 80 y.o. admitted for TIA workup. He lives with spouse in a single story home and reports increased falls in the past few weeks. He typically ambulates with a rolling walker. Has a history of CVA in 2013 and multiple hospitalizations. He is supine and agreeable to therapy. Moderate assist for all mobility including ambulation with poor dynamic standing balance, requiring cues for safety. Noted forward trunk lean during ambulation as well as decreased trunk control. Noted BLE grossly 4/5 strength and intact sensation. RLE knee swollen with abrasion, which patient reports is from a fall. He is at a high risk of falling at this time. Char Singh is currently functioning below his baseline and would benefit from skilled PT during acute care stay to maximize safety and independence with functional mobility. This section established at most recent assessment   PROBLEM LIST (Impairments causing functional limitations):  1. Decreased Strength  2. Decreased ADL/Functional Activities  3. Decreased Transfer Abilities  4. Decreased Ambulation Ability/Technique  5. Decreased Balance  6. Decreased Knowledge of Precautions  7. Decreased [place] with Home Exercise Program   INTERVENTIONS PLANNED: (Benefits and precautions of physical therapy have been discussed with the patient.)  1. Balance Exercise  2. Bed Mobility  3. Family Education  4. Gait Training  5. Home Exercise Program (HEP)  6. Therapeutic Activites  7. Therapeutic Exercise/Strengthening  8. Transfer Training  9. Patient Education  10.  Group Therapy     TREATMENT PLAN: Frequency/Duration: 3 times a week for duration of hospital stay  Rehabilitation Potential For Stated Goals: Good     RECOMMENDED REHABILITATION/EQUIPMENT: (at time of discharge pending progress): Due to the probability of continued deficits (see above) this patient will likely need continued skilled physical therapy after discharge. Equipment:    None at this time              HISTORY:   History of Present Injury/Illness (Reason for Referral):  Per MD: Char Singh is an 80 y.o. yo WM, with a pmh of HTN, CVA, CAD status post CABG and hypothyroidism, who presents to the ED via EMS for acute onset confusion, difficulty speaking and decreased  of his right hand. These symptoms resolved after about thirty minutes. Per EMS he had a run of an irregular heartbeat en route but is currently regular. States he does not have a known history of fib/flutter but a flutter is listed in his pmh. CT head and CT cerv spine neg for acute abnormality in ED. Does endorse difficulty urinating and frequent burning/infections. UA with > 100 WBCs and 4+ bacteria. Denies sob, chest pain, nausea, fever, chills. Did fall yesterday and has significant swelling and pain to his right knee. Past Medical History/Comorbidities:   Mr. Pete Rollins  has a past medical history of Arthritis; Asthma; Atrial flutter with rapid ventricular response ([address] (6/18/2014); Basal cell carcinoma of skin of other and unspecified parts of face (2/20/2014); Benign hypertensive heart disease without heart failure (9/29/2015); BPH (benign prostatic hyperplasia) (4/16/2013); CAD (coronary artery disease); Calculus of kidney (2/20/2014); Cancer ([address] Carotid artery stenosis with cerebral infarction ([facility] (9/29/2015); Chest pain, unspecified (12/11/2013); CVA (cerebral vascular accident) ([address] (4/16/2013); Diarrhea following gastrointestinal surgery (4/16/2013); Diverticulitis (colectomy); Dizziness (6/18/2014); Generalized weakness; GERD (gastroesophageal reflux disease); Gout (4/16/2013); Heart disease, unspecified (2/20/2014); History of basal cell cancer (nose); History of CVA (cerebrovascular accident) (6/18/2014); History of kidney stones (2006); Hyperlipidemia (4/16/2013); Hypertension;  Hypertrophy of prostate with urinary obstruction and other lower urinary tract symptoms (LUTS) (2/20/2014); Hypothyroidism (4/16/2013); Incomplete bladder emptying (2/20/2014); Iron deficiency anemia (4/16/2013); NSTEMI (non-ST elevated myocardial infarction) ([facility] (12/12/2013); Osteoarthritis (4/16/2013); Platelet inhibition due to Plavix; Pure hypercholesterolemia (2/20/2014); Stroke [facility]); Unspecified adverse effect of anesthesia; Unspecified asthma (2/20/2014); Unspecified disorder of thyroid (2/20/2014); Urinary frequency (2/20/2014); and Urinary tract infection, site not specified (11/20/2013). He also has no past medical history of Difficult intubation. Mr. Umang Hernandez  has a past surgical history that includes hx cholecystectomy; hx small bowel resection; pr removal of kidney stone (05/2016); hx carotid endarterectomy (Right, 2011); hx appendectomy (age 15); hx cataract removal (Bilateral, 2006); hx tonsillectomy; hx adenoidectomy; pr cabg, artery-vein, four (2005); pr left heart cath,percutaneous (12/12/2013); hx other surgical (3/12/14); hx coronary stent placement; pr total knee arthroplasty (2007); and pr total knee arthroplasty (2012). Social History/Living Environment:   Home Environment: Private residence  # Steps to Enter: 0  One/Two Story Residence: One story  Living Alone: No  Support Systems: Spouse/Significant Other/Partner, Child(nigel)  Patient Expects to be Discharged to[de-identified] Private residence  Current DME Used/Available at Home: Walker, rolling  Prior Level of Function/Work/Activity:  Patient ambulated with rolling walker, reports increased falls recently.      Number of Personal Factors/Comorbidities that affect the Plan of Care: 3+: HIGH COMPLEXITY   EXAMINATION:   Most Recent Physical Functioning:   Gross Assessment:  AROM: Generally decreased, functional  PROM: Generally decreased, functional  Strength: Generally decreased, functional  Coordination: Generally decreased, functional  Sensation: Intact Posture:  Posture (WDL): Exceptions to WDL  Posture Assessment: Forward head  Balance:  Sitting: Impaired  Sitting - Static: Good (unsupported)  Sitting - Dynamic: Fair (occasional)  Standing: Impaired  Standing - Static: Fair  Standing - Dynamic : Poor Bed Mobility:  Supine to Sit: Minimum assistance; Moderate assistance  Sit to Supine: Stand-by assistance  Scooting: Minimum assistance  Wheelchair Mobility:     Transfers:  Sit to Stand: Minimum assistance  Stand to Sit: Minimum assistance  Gait:     Base of Support: Center of gravity altered; Widened  Speed/Jaida: Slow;Shuffled;Pace decreased (<100 feet/min)  Step Length: Right shortened;Left shortened  Gait Abnormalities: Decreased step clearance; Path deviations;Trunk sway increased  Distance (ft): 25 Feet (ft)  Assistive Device: Walker, rolling;Gait belt  Ambulation - Level of Assistance: Moderate assistance;Assist x1  Interventions: Manual cues; Safety awareness training;Verbal cues; Visual/Demos      Body Structures Involved:  1. Nerves  2. Bones  3. Joints  4. Muscles  5. Ligaments Body Functions Affected:  1. Sensory/Pain  2. Neuromusculoskeletal  3. Movement Related Activities and Participation Affected:  1. Mobility  2. Self Care  3. Domestic Life  4. Interpersonal Interactions and Relationships  5. Community, Social and [place]   Number of elements that affect the Plan of Care: 4+: HIGH COMPLEXITY   CLINICAL PRESENTATION:   Presentation: Evolving clinical presentation with changing clinical characteristics: MODERATE COMPLEXITY   CLINICAL DECISION MAKIN [facility] Mobility Inpatient Short Form  How much difficulty does the patient currently have. .. Unable A Lot A Little None   1. Turning over in bed (including adjusting bedclothes, sheets and blankets)? [] 1   [x] 2   [] 3   [] 4   2.   Sitting down on and standing up from a chair with arms ( e.g., wheelchair, bedside commode, etc.)   [] 1   [x] 2   [] 3   [] 4 3. Moving from lying on back to sitting on the side of the bed? [] 1   [x] 2   [] 3   [] 4   How much help from another person does the patient currently need. .. Total A Lot A Little None   4. Moving to and from a bed to a chair (including a wheelchair)? [] 1   [x] 2   [] 3   [] 4   5. Need to walk in hospital room? [] 1   [x] 2   [] 3   [] 4   6. Climbing 3-5 steps with a railing? [] 1   [x] 2   [] 3   [] 4   © 2007, Trustees of [address] [place] [address] [address] [id number], under license to [employer]. All rights reserved      Score:  Initial: 12 Most Recent: X (Date: -- )    Interpretation of Tool:  Represents activities that are increasingly more difficult (i.e. Bed mobility, Transfers, Gait). Score 24 23 22-20 19-15 14-10 9-7 6     Modifier CH CI CJ CK CL CM CN      ? Mobility - Walking and Moving Around:     - CURRENT STATUS: CL - 60%-79% impaired, limited or restricted    - GOAL STATUS: CK - 40%-59% impaired, limited or restricted    - D/C STATUS:  ---------------To be determined---------------  [place] / [place] / Product Type: Medicare /      Medical Necessity:     · Patient demonstrates good rehab potential due to higher previous functional level. Reason for Services/Other Comments:  · Patient continues to require modification of therapeutic interventions to increase complexity of exercises.    Use of outcome tool(s) and clinical judgement create a POC that gives a: Questionable prediction of patient's progress: MODERATE COMPLEXITY            TREATMENT:   (In addition to Assessment/Re-Assessment sessions the following treatments were rendered)   Pre-treatment Symptoms/Complaints:  none  Pain: Initial:     0/10 Post Session:  0/10     Assessment/Reassessment only, no treatment provided today    Braces/Orthotics/Lines/Etc:   · IV  · O2 Device: Room air  Treatment/Session Assessment:    · Response to Treatment:  Patient tolerated with minimally increased R knee pain, however subsided at rest.  · Interdisciplinary Collaboration:   o Physical Therapist  o Registered Nurse  · After treatment position/precautions:   o Supine in bed  o Bed alarm/tab alert on  o Bed/Chair-wheels locked  o Bed in low position  o Call light within reach  o RN notified  o Family at bedside   · Compliance with Program/Exercises: Will assess as treatment progresses. · Recommendations/Intent for next treatment session: \"Next visit will focus on advancements to more challenging activities and reduction in assistance provided\".   Total Treatment Duration:  PT Patient Time In/Time Out  Time In: 1116  Time Out: [place]

## 2018-03-27 NOTE — ROUTINE PROCESS
Dual skin assessment with Angela Cintron RN  - abrasion on the lower chin related to patient shaving  - chest and abdominal scars related to H/O surgeries  -right knee is + 2 nonpitting edema, hot, abrasion r/t fall on 3/24/18 fall at home  - sacral/ coccyx area is red and blanchable  -bilateral heels are red and blanchable    Stroke treatment brochure was provided to: spouse/SO. Rationale for acute work-up of symptoms explained. Possible treatments, such as tPA or intervention for ischemic strokes and the need for a quick work-up, have been reviewed. Patient code given to family members per patient's permission.

## 2018-03-27 NOTE — PROGRESS NOTES
3/27/2018   Attempted OT evaluation, however patient is off the floor to MRI. Will re-attempt as time permits.    Thank you,  Maribeth Gustafson, OT

## 2018-03-27 NOTE — ROUTINE PROCESS
TRANSFER - IN REPORT:    Verbal report received from Hyacinth(name) on Mook Quinones  being received from ED(unit) for ordered procedure      Report consisted of patients Situation, Background, Assessment and   Recommendations(SBAR). Information from the following report(s) ED Summary was reviewed with the receiving nurse. Opportunity for questions and clarification was provided. Assessment completed upon patients arrival to unit and care assumed.

## 2018-03-27 NOTE — PROGRESS NOTES
STG: Pt will tolerate regular textures/thin liquids without overt signs/sx of aspiration with 100% accuracy  STG: Pt will participate with full cognitive-linguistic assessment x1  LTG: Pt will tolerate the least restrictive diet at discharge without respiratory compromise    Speech language pathology: OBSERVATION bedside swallow note: Initial Assessment    NAME/AGE/GENDER: Soledad Carrillo is a 80 y.o. male  DATE: 3/27/2018  PRIMARY DIAGNOSIS: TIA (transient ischemic attack)       ICD-10: Treatment Diagnosis: dysphagia; oropharyngeal R13.12  INTERDISCIPLINARY COLLABORATION: Student RN  PRECAUTIONS/ALLERGIES: Review of patient's allergies indicates no known allergies. ASSESSMENT:Based on the objective data described below, Mr. Tom Mo presents with no overt signs/sx of aspiration with thin liquids by straw or regular textures. Patient tolerated his daughter's banana bread consisting of pecan and walnuts with mastication time only minimally delayed and complete oral clearance. Speech is clear but with some intermittent confusion noted during the session which the patient appears to mask with humor. Patient lives with his wife who assists with higher level ADLs. Per daughter he is closer to his baseline then upon admission. MRI results pending. Recommend continue cardiac diet/thin liquids. Will follow for full cognitive-linguistic assessment. Patient will benefit from skilled intervention to address the below impairments. ?????? ? ? This section established at most recent assessment??????????  PROBLEM LIST (Impairments causing functional limitations):  1. dysphagia  REHABILITATION POTENTIAL FOR STATED GOALS: Good  PLAN OF CARE:   Patient will benefit from skilled intervention to address the following impairments.   RECOMMENDATIONS AND PLANNED INTERVENTIONS (Benefits and precautions of therapy have been discussed with the patient.):  · PO:  Regular  · Liquids:  regular thin  MEDICATIONS:  · With liquid  COMPENSATORY STRATEGIES/MODIFICATIONS INCLUDING:  · Small sips and bites  OTHER RECOMMENDATIONS (including follow up treatment recommendations): · Family training/education  · Patient education  · cognitive tx  RECOMMENDED DIET MODIFICATIONS DISCUSSED WITH:  · Nursing  · Patient  FREQUENCY/DURATION: Continue to follow patient 3 times a week for duration of hospital stay to address above goals. RECOMMENDED REHABILITATION/EQUIPMENT: (at time of discharge pending progress): Due to the probability of continued deficits (see above) this patient will likely need continued skilled speech therapy after discharge. SUBJECTIVE:   Cooperative. History of Present Injury/Illness: Mr. Leigh Ann Thomson  has a past medical history of Arthritis; Asthma; Atrial flutter with rapid ventricular response ([facility] [address] (6/18/2014); Basal cell carcinoma of skin of other and unspecified parts of face (2/20/2014); Benign hypertensive heart disease without heart failure (9/29/2015); BPH (benign prostatic hyperplasia) (4/16/2013); CAD (coronary artery disease); Calculus of kidney (2/20/2014); Cancer ([address] Carotid artery stenosis with cerebral infarction ([facility] [address] (9/29/2015); Chest pain, unspecified (12/11/2013); CVA (cerebral vascular accident) ([address] (4/16/2013); Diarrhea following gastrointestinal surgery (4/16/2013); Diverticulitis (colectomy); Dizziness (6/18/2014); Generalized weakness; GERD (gastroesophageal reflux disease); Gout (4/16/2013); Heart disease, unspecified (2/20/2014); History of basal cell cancer (nose); History of CVA (cerebrovascular accident) (6/18/2014); History of kidney stones (2006); Hyperlipidemia (4/16/2013); Hypertension; Hypertrophy of prostate with urinary obstruction and other lower urinary tract symptoms (LUTS) (2/20/2014); Hypothyroidism (4/16/2013); Incomplete bladder emptying (2/20/2014); Iron deficiency anemia (4/16/2013); NSTEMI (non-ST elevated myocardial infarction) ([address] (12/12/2013); Osteoarthritis (4/16/2013);  Platelet inhibition due to Plavix; Pure hypercholesterolemia (2/20/2014); Stroke [facility]); Unspecified adverse effect of anesthesia; Unspecified asthma (2/20/2014); Unspecified disorder of thyroid (2/20/2014); Urinary frequency (2/20/2014); and Urinary tract infection, site not specified (11/20/2013). He also has no past medical history of Difficult intubation. He also  has a past surgical history that includes hx cholecystectomy; hx small bowel resection; pr removal of kidney stone (05/2016); hx carotid endarterectomy (Right, 2011); hx appendectomy (age 15); hx cataract removal (Bilateral, 2006); hx tonsillectomy; hx adenoidectomy; pr cabg, artery-vein, four (2005); pr left heart cath,percutaneous (12/12/2013); hx other surgical (3/12/14); hx coronary stent placement; pr total knee arthroplasty (2007); and pr total knee arthroplasty (2012). Present Symptoms:   Pain Intensity 1: 0  Pain Location 1: Knee  Pain Orientation 1: Right  Pain Intervention(s) 1: Medication (see MAR)  Current Medications:   No current facility-administered medications on file prior to encounter. Current Outpatient Prescriptions on File Prior to Encounter   Medication Sig Dispense Refill    escitalopram oxalate (LEXAPRO) 10 mg tablet TAKE 1 TABLET BY MOUTH EVERY MORNING. 90 Tab 3    pantoprazole (PROTONIX) 40 mg tablet TAKE 1 TABLET BY MOUTH DAILY. 90 Tab 3    simvastatin (ZOCOR) 20 mg tablet TAKE 1 TAB BY MOUTH NIGHTLY. 90 Tab 3    niacin (NIASPAN) 1,000 mg Tb24 tab TAKE 1 TAB BY MOUTH NIGHTLY. 90 Tab 3    levothyroxine (SYNTHROID) 150 mcg tablet TAKE 1 TABLET EVERY DAY BEFORE BREAKFAST 90 Tab 1    carvedilol (COREG) 3.125 mg tablet TAKE 1 TABLET TWICE DAILY. 180 Tab 3    clopidogrel (PLAVIX) 75 mg tab Take 1 Tab by mouth daily. 90 Tab 3    potassium citrate (UROCIT-K10) 10 mEq (1,080 mg) TbER TAKE 2 TABLETS TWICE DAILY 360 Tab 4    nitroglycerin (NITROSTAT) 0.4 mg SL tablet by SubLINGual route every five (5) minutes as needed for Chest Pain.  aspirin delayed-release 81 mg tablet Take 81 mg by mouth daily.  ferrous sulfate (IRON) 325 mg (65 mg iron) tablet Take 65 mg by mouth Daily (before breakfast). Current Dietary Status: cardiac      History of reflux:  yes   Reflux medication: protonix  Social History/Home Situation: home with wife  Home Environment: Private residence  One/Two Story Residence: One story  Living Alone: Yes  Support Systems: Family member(s), Spouse/Significant Other/Partner  Patient Expects to be Discharged to[de-identified] Private residence  Current DME Used/Available at Home: Shower chair  OBJECTIVE:   Respiratory Status:        CXR Results: n/a  MRI/CT Results: pending  Oral Motor Structure/Speech:  Oral-Motor Structure/Motor Speech  Labial: No impairment  Dentition: Upper & lower dentures  Lingual: No impairment    Cognitive and Communication Status:  Neurologic State: Alert  Orientation Level: Oriented X4  Cognition: Follows commands  Perception: Appears intact          BEDSIDE SWALLOW EVALUATION  Oral Assessment:  Oral Assessment  Labial: No impairment  Dentition: Upper & lower dentures  Lingual: No impairment  P.O. Trials:  Patient Position: upright in bed    The patient was given tsp to straw amounts of the following:        ORAL PHASE:  Bolus Acceptance: No impairment  Bolus Formation/Control: Impaired  Propulsion: No impairment  Type of Impairment: Mastication;Delayed (mild)  Oral Residue: None    PHARYNGEAL PHASE:  Initiation of Swallow: No impairment  Laryngeal Elevation: Functional  Aspiration Signs/Symptoms: None  Vocal Quality: No impairment           Pharyngeal Phase Characteristics: No impairment, issues, or problems     OTHER OBSERVATIONS:  Rate/bite size: WNL   Endurance:   WNL     Tool Used: Dysphagia Outcome and Severity Scale (CHEL)    Score Comments   Normal Diet  [x] 7 With no strategies or extra time needed   Functional Swallow  [] 6 May have mild oral or pharyngeal delay       Mild Dysphagia    [] 5 Which may require one diet consistency restricted (those who demonstrate penetration which is entirely cleared on MBS would be included)   Mild-Moderate Dysphagia  [] 4 With 1-2 diet consistencies restricted       Moderate Dysphagia  [] 3 With 2 or more diet consistencies restricted       Moderately Severe Dysphagia  [] 2 With partial PO strategies (trials with ST only)       Severe Dysphagia  [] 1 With inability to tolerate any PO safely          Score:  Initial: 7 Most Recent: X (Date: -- )   Interpretation of Tool: The Dysphagia Outcome and Severity Scale (CHEL) is a simple, easy-to-use, 7-point scale developed to systematically rate the functional severity of dysphagia based on objective assessment and make recommendations for diet level, independence level, and type of nutrition. Score 7 6 5 4 3 2 1   Modifier CH CI CJ CK CL CM CN   ?  Swallowing:     - CURRENT STATUS: CH - 0% impaired, limited or restricted    - GOAL STATUS:  CH - 0% impaired, limited or restricted    - D/C STATUS:  CH - 0% impaired, limited or restricted  [place] / [place] / Product Type: Medicare /     TREATMENT:    (In addition to Assessment/Re-Assessment sessions the following treatments were rendered)  Assessment/Reassessment only, no treatment provided today  MODALITIES:                                                                    ORAL MOTOR  EXERCISES:                                                                                                                                                                      LARYNGEAL / PHARYNGEAL EXERCISES:                                                                                                                                     __________________________________________________________________________________________________  Safety:   After treatment position/precautions:  · Family at bedside  · Upright in Bed  Progression/Medical Necessity:   · Skilled intervention continues to be required due to unable to attend/participate in therapy as expected. Compliance with Program/Exercises: Will assess as treatment progresses. Reason for Continuation of Services/Other Comments:  · Patient continues to require skilled intervention due to patient unable to attend/participate in therapy as expected. Recommendations/Intent for next treatment session: \"Treatment next visit will focus on cognitive assessment\".     Total Treatment Duration:  Time In: 1001  Time Out: [address] [facility] 98 MS, CCC-SLP

## 2018-03-27 NOTE — PROGRESS NOTES
Attempted to reach nurse several times-no answer. Left message with Julien Loredo on floor to have RN fill out MRI Safety and Screening form with signatures in computer.

## 2018-03-27 NOTE — PROGRESS NOTES
Problem: Interdisciplinary Rounds  Goal: Interdisciplinary Rounds  Outcome: Progressing Towards Goal  Interdisciplinary team rounds were held 3/27/2018 with the following team members:Care Management, Physical Therapy, Physician,  and Director. Plan of care discussed. See clinical pathway and/or care plan for interventions and desired outcomes.

## 2018-03-28 ENCOUNTER — HOSPITAL ENCOUNTER (OUTPATIENT)
Dept: MRI IMAGING | Age: 83
Discharge: HOME | DRG: 065 | End: 2018-03-28
Attending: INTERNAL MEDICINE
Payer: MEDICARE

## 2018-03-28 ENCOUNTER — APPOINTMENT (OUTPATIENT)
Dept: GENERAL RADIOLOGY | Age: 83
DRG: 065 | End: 2018-03-28
Attending: INTERNAL MEDICINE
Payer: MEDICARE

## 2018-03-28 LAB
ANION GAP SERPL CALC-SCNC: 6 MMOL/L (ref 7–16)
ATRIAL RATE: 63 BPM
BASOPHILS # BLD: 0 K/UL (ref 0–0.2)
BASOPHILS NFR BLD: 0 % (ref 0–2)
BUN SERPL-MCNC: 23 MG/DL (ref 8–23)
CALCIUM SERPL-MCNC: 9.1 MG/DL (ref 8.3–10.4)
CALCULATED P AXIS, ECG09: 42 DEGREES
CALCULATED R AXIS, ECG10: -73 DEGREES
CALCULATED T AXIS, ECG11: 84 DEGREES
CHLORIDE SERPL-SCNC: 109 MMOL/L (ref 98–107)
CK SERPL-CCNC: 69 U/L (ref 21–215)
CO2 SERPL-SCNC: 26 MMOL/L (ref 21–32)
CREAT SERPL-MCNC: 1.73 MG/DL (ref 0.8–1.5)
DIAGNOSIS, 93000: NORMAL
DIFFERENTIAL METHOD BLD: ABNORMAL
EOSINOPHIL # BLD: 0.7 K/UL (ref 0–0.8)
EOSINOPHIL NFR BLD: 8 % (ref 0.5–7.8)
ERYTHROCYTE [DISTWIDTH] IN BLOOD BY AUTOMATED COUNT: 14 % (ref 11.9–14.6)
GLUCOSE BLD STRIP.AUTO-MCNC: 110 MG/DL (ref 65–100)
GLUCOSE SERPL-MCNC: 109 MG/DL (ref 65–100)
HCT VFR BLD AUTO: 35.3 % (ref 41.1–50.3)
HGB BLD-MCNC: 11.6 G/DL (ref 13.6–17.2)
IMM GRANULOCYTES # BLD: 0 K/UL (ref 0–0.5)
IMM GRANULOCYTES NFR BLD AUTO: 0 % (ref 0–5)
LACTATE SERPL-SCNC: 1.3 MMOL/L (ref 0.4–2)
LYMPHOCYTES # BLD: 2 K/UL (ref 0.5–4.6)
LYMPHOCYTES NFR BLD: 22 % (ref 13–44)
MAGNESIUM SERPL-MCNC: 1.9 MG/DL (ref 1.8–2.4)
MCH RBC QN AUTO: 28.1 PG (ref 26.1–32.9)
MCHC RBC AUTO-ENTMCNC: 32.9 G/DL (ref 31.4–35)
MCV RBC AUTO: 85.5 FL (ref 79.6–97.8)
MM INDURATION POC: 0 MM (ref 0–5)
MONOCYTES # BLD: 0.6 K/UL (ref 0.1–1.3)
MONOCYTES NFR BLD: 7 % (ref 4–12)
NEUTS SEG # BLD: 5.5 K/UL (ref 1.7–8.2)
NEUTS SEG NFR BLD: 63 % (ref 43–78)
P-R INTERVAL, ECG05: 202 MS
PLATELET # BLD AUTO: 182 K/UL (ref 150–450)
PMV BLD AUTO: 10.7 FL (ref 10.8–14.1)
POTASSIUM SERPL-SCNC: 4.2 MMOL/L (ref 3.5–5.1)
PPD POC: NEGATIVE NEGATIVE
Q-T INTERVAL, ECG07: 486 MS
QRS DURATION, ECG06: 164 MS
QTC CALCULATION (BEZET), ECG08: 497 MS
RBC # BLD AUTO: 4.13 M/UL (ref 4.23–5.67)
SODIUM SERPL-SCNC: 141 MMOL/L (ref 136–145)
T4 FREE SERPL-MCNC: 1.2 NG/DL (ref 0.78–1.46)
TROPONIN I SERPL-MCNC: <0.02 NG/ML (ref 0.02–0.05)
VENTRICULAR RATE, ECG03: 63 BPM
WBC # BLD AUTO: 8.8 K/UL (ref 4.3–11.1)

## 2018-03-28 PROCEDURE — 74011000258 HC RX REV CODE- 258: Performed by: INTERNAL MEDICINE

## 2018-03-28 PROCEDURE — C8929 TTE W OR WO FOL WCON,DOPPLER: HCPCS

## 2018-03-28 PROCEDURE — 83735 ASSAY OF MAGNESIUM: CPT | Performed by: INTERNAL MEDICINE

## 2018-03-28 PROCEDURE — 70551 MRI BRAIN STEM W/O DYE: CPT

## 2018-03-28 PROCEDURE — 82962 GLUCOSE BLOOD TEST: CPT

## 2018-03-28 PROCEDURE — 74011000250 HC RX REV CODE- 250: Performed by: INTERNAL MEDICINE

## 2018-03-28 PROCEDURE — 74011250637 HC RX REV CODE- 250/637: Performed by: INTERNAL MEDICINE

## 2018-03-28 PROCEDURE — G8989 SELF CARE D/C STATUS: HCPCS

## 2018-03-28 PROCEDURE — 82550 ASSAY OF CK (CPK): CPT | Performed by: INTERNAL MEDICINE

## 2018-03-28 PROCEDURE — 99218 HC RM OBSERVATION: CPT

## 2018-03-28 PROCEDURE — 84484 ASSAY OF TROPONIN QUANT: CPT | Performed by: INTERNAL MEDICINE

## 2018-03-28 PROCEDURE — 92523 SPEECH SOUND LANG COMPREHEN: CPT

## 2018-03-28 PROCEDURE — 85025 COMPLETE CBC W/AUTO DIFF WBC: CPT | Performed by: INTERNAL MEDICINE

## 2018-03-28 PROCEDURE — 80048 BASIC METABOLIC PNL TOTAL CA: CPT | Performed by: INTERNAL MEDICINE

## 2018-03-28 PROCEDURE — 97165 OT EVAL LOW COMPLEX 30 MIN: CPT

## 2018-03-28 PROCEDURE — 71045 X-RAY EXAM CHEST 1 VIEW: CPT

## 2018-03-28 PROCEDURE — 74011250636 HC RX REV CODE- 250/636: Performed by: INTERNAL MEDICINE

## 2018-03-28 PROCEDURE — 83605 ASSAY OF LACTIC ACID: CPT | Performed by: INTERNAL MEDICINE

## 2018-03-28 PROCEDURE — 84439 ASSAY OF FREE THYROXINE: CPT | Performed by: INTERNAL MEDICINE

## 2018-03-28 PROCEDURE — G8987 SELF CARE CURRENT STATUS: HCPCS

## 2018-03-28 PROCEDURE — 93005 ELECTROCARDIOGRAM TRACING: CPT | Performed by: INTERNAL MEDICINE

## 2018-03-28 PROCEDURE — 65660000000 HC RM CCU STEPDOWN

## 2018-03-28 PROCEDURE — 36415 COLL VENOUS BLD VENIPUNCTURE: CPT | Performed by: INTERNAL MEDICINE

## 2018-03-28 PROCEDURE — G8988 SELF CARE GOAL STATUS: HCPCS

## 2018-03-28 RX ORDER — LEVETIRACETAM 500 MG/1
500 TABLET ORAL 2 TIMES DAILY
Status: DISCONTINUED | OUTPATIENT
Start: 2018-03-28 | End: 2018-03-29 | Stop reason: HOSPADM

## 2018-03-28 RX ORDER — MAGNESIUM SULFATE 1 G/100ML
1 INJECTION INTRAVENOUS ONCE
Status: COMPLETED | OUTPATIENT
Start: 2018-03-28 | End: 2018-03-28

## 2018-03-28 RX ADMIN — HEPARIN SODIUM 5000 UNITS: 5000 INJECTION, SOLUTION INTRAVENOUS; SUBCUTANEOUS at 15:28

## 2018-03-28 RX ADMIN — LEVETIRACETAM 500 MG: 500 TABLET ORAL at 20:54

## 2018-03-28 RX ADMIN — PERFLUTREN 1 ML: 6.52 INJECTION, SUSPENSION INTRAVENOUS at 10:00

## 2018-03-28 RX ADMIN — LEVOTHYROXINE SODIUM 150 MCG: 150 TABLET ORAL at 06:15

## 2018-03-28 RX ADMIN — NIACIN 1000 MG: 500 TABLET, EXTENDED RELEASE ORAL at 10:41

## 2018-03-28 RX ADMIN — HEPARIN SODIUM 5000 UNITS: 5000 INJECTION, SOLUTION INTRAVENOUS; SUBCUTANEOUS at 06:13

## 2018-03-28 RX ADMIN — SODIUM CHLORIDE 1 G: 900 INJECTION, SOLUTION INTRAVENOUS at 15:29

## 2018-03-28 RX ADMIN — Medication 10 ML: at 15:29

## 2018-03-28 RX ADMIN — CARVEDILOL 6.25 MG: 6.25 TABLET, FILM COATED ORAL at 15:31

## 2018-03-28 RX ADMIN — HEPARIN SODIUM 5000 UNITS: 5000 INJECTION, SOLUTION INTRAVENOUS; SUBCUTANEOUS at 20:53

## 2018-03-28 RX ADMIN — SODIUM CHLORIDE 500 MG: 900 INJECTION, SOLUTION INTRAVENOUS at 13:23

## 2018-03-28 RX ADMIN — CLOPIDOGREL BISULFATE 75 MG: 75 TABLET ORAL at 10:41

## 2018-03-28 RX ADMIN — MAGNESIUM SULFATE HEPTAHYDRATE 1 G: 1 INJECTION, SOLUTION INTRAVENOUS at 20:52

## 2018-03-28 RX ADMIN — Medication 10 ML: at 06:17

## 2018-03-28 RX ADMIN — ASPIRIN 325 MG ORAL TABLET 325 MG: 325 PILL ORAL at 10:41

## 2018-03-28 RX ADMIN — CARVEDILOL 6.25 MG: 6.25 TABLET, FILM COATED ORAL at 10:41

## 2018-03-28 RX ADMIN — PRAVASTATIN SODIUM 80 MG: 80 TABLET ORAL at 20:54

## 2018-03-28 RX ADMIN — PANTOPRAZOLE SODIUM 40 MG: 40 TABLET, DELAYED RELEASE ORAL at 06:16

## 2018-03-28 RX ADMIN — ESCITALOPRAM OXALATE 10 MG: 10 TABLET ORAL at 10:41

## 2018-03-28 NOTE — PROGRESS NOTES
Problem: Self Care Deficits Care Plan (Adult)  Goal: *Acute Goals and Plan of Care (Insert Text)    OCCUPATIONAL THERAPY: Initial Assessment and Discharge 3/28/2018  OBSERVATION: Hospital Day: 3  [place] / Product Type: Medicare /      NAME/AGE/GENDER: Connee Aase is a 80 y.o. male   PRIMARY DIAGNOSIS:  TIA (transient ischemic attack) TIA (transient ischemic attack) TIA (transient ischemic attack)        ICD-10: Treatment Diagnosis:    · Generalized Muscle Weakness (M62.81)  · History of falling (Z91.81)   Precautions/Allergies:     Review of patient's allergies indicates no known allergies. ASSESSMENT:     Mr. Jaylen García presents s/p the above and areas of erythema bilateral knees from a recent fall. BUE grossly equal in strength with no complaints of deficits beyond what he is used to - generalized weakness/unsteadiness on feet typically. He completed functional mobility in room/christy with rolling walker and contact guard assistance. Donned his pants sitting edge of bed with standby assistance. He appears at his functional baseline in terms of ADL/functional mobility for ADL. This section established at most recent assessment   PROBLEM LIST (Impairments causing functional limitations):  1. Decreased Strength  2. Decreased Transfer Abilities  3. Decreased Balance  4. Increased Pain  5. Decreased Activity Tolerance  6. Decreased Flexibility/Joint Mobility   INTERVENTIONS PLANNED: (Benefits and precautions of occupational therapy have been discussed with the patient.)  1. Assessment only. TREATMENT PLAN: Frequency/Duration: Follow patient for today's assessment only. Rehabilitation Potential For Stated Goals: N/A     RECOMMENDED REHABILITATION/EQUIPMENT: (at time of discharge pending progress): Due to the probability of continued deficits (see above) this patient will not likely need continued skilled occupational therapy after discharge.   Equipment:    None at this time              OCCUPATIONAL PROFILE AND HISTORY:   History of Present Injury/Illness (Reason for Referral):  Per MD H & P: Emilie Watkins is an 80 y.o. yo WM, with a pmh of HTN, CVA, CAD status post CABG and hypothyroidism, who presents to the ED via EMS for acute onset confusion, difficulty speaking and decreased  of his right hand. These symptoms resolved after about thirty minutes. Per EMS he had a run of an irregular heartbeat en route but is currently regular. States he does not have a known history of fib/flutter but a flutter is listed in his pmh. CT head and CT cerv spine neg for acute abnormality in ED. Does endorse difficulty urinating and frequent burning/infections. UA with > 100 WBCs and 4+ bacteria. Denies sob, chest pain, nausea, fever, chills. Did fall yesterday and has significant swelling and pain to his right knee. Past Medical History/Comorbidities:   Mr. Cydney Fernandez  has a past medical history of Arthritis; Asthma; Atrial flutter with rapid ventricular response ([address] (6/18/2014); Basal cell carcinoma of skin of other and unspecified parts of face (2/20/2014); Benign hypertensive heart disease without heart failure (9/29/2015); BPH (benign prostatic hyperplasia) (4/16/2013); CAD (coronary artery disease); Calculus of kidney (2/20/2014); Cancer ([address] Carotid artery stenosis with cerebral infarction ([address] (9/29/2015); Chest pain, unspecified (12/11/2013); CVA (cerebral vascular accident) ([address] (4/16/2013); Diarrhea following gastrointestinal surgery (4/16/2013); Diverticulitis (colectomy); Dizziness (6/18/2014); Generalized weakness; GERD (gastroesophageal reflux disease); Gout (4/16/2013); Heart disease, unspecified (2/20/2014); History of basal cell cancer (nose); History of CVA (cerebrovascular accident) (6/18/2014); History of kidney stones (2006); Hyperlipidemia (4/16/2013); Hypertension;  Hypertrophy of prostate with urinary obstruction and other lower urinary tract symptoms (LUTS) (2/20/2014); Hypothyroidism (4/16/2013); Incomplete bladder emptying (2/20/2014); Iron deficiency anemia (4/16/2013); NSTEMI (non-ST elevated myocardial infarction) ([facility] [address] (12/12/2013); Osteoarthritis (4/16/2013); Platelet inhibition due to Plavix; Pure hypercholesterolemia (2/20/2014); Stroke [facility]); Unspecified adverse effect of anesthesia; Unspecified asthma (2/20/2014); Unspecified disorder of thyroid (2/20/2014); Urinary frequency (2/20/2014); and Urinary tract infection, site not specified (11/20/2013). He also has no past medical history of Difficult intubation. Mr. Ayan Castaneda  has a past surgical history that includes hx cholecystectomy; hx small bowel resection; pr removal of kidney stone (05/2016); hx carotid endarterectomy (Right, 2011); hx appendectomy (age 15); hx cataract removal (Bilateral, 2006); hx tonsillectomy; hx adenoidectomy; pr cabg, artery-vein, four (2005); pr left heart cath,percutaneous (12/12/2013); hx other surgical (3/12/14); hx coronary stent placement; pr total knee arthroplasty (2007); and pr total knee arthroplasty (2012). Social History/Living Environment: Lives with wife. Home Environment: Private residence  # Steps to Enter: 0  One/Two Story Residence: One story  Living Alone: No  Support Systems: Spouse/Significant Other/Partner, Child(nigel)  Patient Expects to be Discharged to[de-identified] Private residence  Current DME Used/Available at Home: lloyd Rae  Prior Level of Function/Work/Activity:  Receives assistance for ADL from wife. Uses a \"Rufus\" cane to walk in public. Rolling walker at home.    Number of Personal Factors/Comorbidities that affect the Plan of Care: Brief history (0):  LOW COMPLEXITY   ASSESSMENT OF OCCUPATIONAL PERFORMANCE[de-identified]   Activities of Daily Living:           Basic ADLs (From Assessment) Complex ADLs (From Assessment)   Basic ADL  Feeding: Setup  Oral Facial Hygiene/Grooming: Setup  Bathing: Stand-by assistance  Upper Body Dressing: Setup  Lower Body Dressing: Stand-by assistance  Toileting: Stand by assistance     Grooming/Bathing/Dressing Activities of Daily Living     Cognitive Retraining  Safety/Judgement: Awareness of environment                     Lower Body Dressing Assistance  Pants With Elastic Waist: Stand-by assistance Bed/Mat Mobility  Supine to Sit: Supervision  Sit to Stand: Supervision  Scooting: Supervision       Most Recent Physical Functioning:   Gross Assessment:  AROM: Generally decreased, functional  Strength: Generally decreased, functional               Posture:  Posture (WDL): Exceptions to WDL  Posture Assessment: Forward head  Balance:  Sitting: Impaired  Sitting - Static: Good (unsupported)  Sitting - Dynamic: Fair (occasional)  Standing: Impaired; With support  Standing - Static: Fair  Standing - Dynamic : Fair Bed Mobility:  Supine to Sit: Supervision  Scooting: Supervision  Wheelchair Mobility:     Transfers:  Sit to Stand: Supervision  Stand to Sit: Supervision                Patient Vitals for the past 6 hrs:   BP BP Patient Position SpO2 Pulse   03/28/18 0722 178/72 At rest 97 % 60       Mental Status  Neurologic State: Alert  Orientation Level: Oriented to person  Cognition: Follows commands  Perception: Appears intact  Perseveration: No perseveration noted  Safety/Judgement: Awareness of environment                          Physical Skills Involved:  1. Range of Motion  2. Balance  3. Strength  4. Activity Tolerance  5. Gross Motor Control  6. Pain (Chronic)  7. Skin Integrity Cognitive Skills Affected (resulting in the inability to perform in a timely and safe manner):  1. None noted Psychosocial Skills Affected:  1. Habits/Routines  2. Social Roles   Number of elements that affect the Plan of Care: 1-3:  LOW COMPLEXITY   CLINICAL DECISION MAKING:   [employer] AM-PAC 6 Clicks   Daily Activity Inpatient Short Form  How much help from another person does the patient currently need. .. Total A Lot A Little None   1. Putting on and taking off regular lower body clothing? [] 1   [] 2   [x] 3   [] 4   2. Bathing (including washing, rinsing, drying)? [] 1   [] 2   [x] 3   [] 4   3. Toileting, which includes using toilet, bedpan or urinal?   [] 1   [] 2   [x] 3   [] 4   4. Putting on and taking off regular upper body clothing? [] 1   [] 2   [x] 3   [] 4   5. Taking care of personal grooming such as brushing teeth? [] 1   [] 2   [x] 3   [] 4   6. Eating meals? [] 1   [] 2   [x] 3   [] 4   © 2007, Trustees of [address] [place] [address] [address] [id number], under license to [employer]. All rights reserved      Score:  Initial: 18 Most Recent: X (Date: -- )    Interpretation of Tool:  Represents activities that are increasingly more difficult (i.e. Bed mobility, Transfers, Gait). Score 24 23 22-20 19-15 14-10 9-7 6     Modifier CH CI CJ CK CL CM CN      ? Self Care:     - CURRENT STATUS: CK - 40%-59% impaired, limited or restricted    - GOAL STATUS: CK - 40%-59% impaired, limited or restricted    - D/C STATUS:  CK - 40%-59% impaired, limited or restricted  [place] / Product Type: Medicare /      Medical Necessity:     · N/A. Today's assessment only. Reason for Services/Other Comments:  · Today's assessment only. Use of outcome tool(s) and clinical judgement create a POC that gives a: LOW COMPLEXITY         TREATMENT:   (In addition to Assessment/Re-Assessment sessions the following treatments were rendered)     Pre-treatment Symptoms/Complaints:    Pain: Initial:   Pain Intensity 1: 0 (no complaints during assessment)  Post Session:  same     Assessment/Reassessment only, no treatment provided today    Braces/Orthotics/Lines/Etc:   · IV  · O2 Device: Room air  Treatment/Session Assessment:    Response to Treatment:  No treatment rendered. Assessment only.   · Interdisciplinary Collaboration:   o Occupational Therapist  o Registered Nurse  · After treatment position/precautions:   o Up in chair  o Bed/Chair-wheels locked  o Call light within reach  o RN notified  o Family at bedside   · Compliance with Program/Exercises: compliant today. · Recommendations/Intent for next treatment session:  N/A.   Total Treatment Duration:  OT Patient Time In/Time Out  Time In: 1014  Time Out: [address] [facility] Germain, AYLIN, OTR/L

## 2018-03-28 NOTE — PROGRESS NOTES
Attempted OT evaluation. Patient currently getting ECHO. Will check back as schedule permits.    Owendago Maxwell, OTR/L

## 2018-03-28 NOTE — PROGRESS NOTES
Progress Note    Called by nursing to code blue. Patient apparently slumped over and ? Not breathing with his wife while eating ( appeared to not be breathing but not physically checked). .. Also noted to have some twitching of the upper body. By the time staff arrived pt was noted to be breathing and was unresponsive but came around shortly with stable vitals. No events noted on telemetry except pvcs which have chaitanya ongoing. Pt concerning for cardiac event vs tia vs seizures. Has a history of inoperable carotid stenosis on the left. MRI still pending. Will send labs. Load with keppra empircally in case it was a seizure. F/up mri, further workup and mgt based on his clinical course. Will neurology eval once MRI back. Pt high risk given syncopal vs tia vs seizures of unclear etiology. I spent 38 minutes in critical care time.

## 2018-03-28 NOTE — PROGRESS NOTES
Problem: Interdisciplinary Rounds  Goal: Interdisciplinary Rounds  Outcome: Progressing Towards Goal  Interdisciplinary team rounds were held 3/28/2018 with the following team members:Care Management, Occupational Therapy, Physician and  and the patient, spouse and child(nigel). Plan of care discussed. See clinical pathway and/or care plan for interventions and desired outcomes.

## 2018-03-28 NOTE — PROGRESS NOTES
Rapid response called on patient at 1205. Patient was observed by other RNs on the floor as being unresponsive. Hospitalist was present. When this RN arrived to the code, patient appeared conscious with visible respirations. Vitals were stable. Will continue to monitor.

## 2018-03-28 NOTE — PROGRESS NOTES
STG: Pt will complete verbal problem solving tasks with 90% accuracy  STG: Pt will complete basic math/time reasoning tasks with 90% accuracy  STG: Pt will complete higher level word finding tasks with 90% accuracy  STG: Pt will complete functional reading comprehension tasks with 90% accuracy  LTG: Pt will reach highest cognitive level for maximum independence at discharge    Speech language pathology: Speech-language and cognitive note: Initial Assessment    NAME/AGE/GENDER: Pamela Polk is a 80 y.o. male  DATE: 3/28/2018  PRIMARY DIAGNOSIS: TIA (transient ischemic attack)  TIA (transient ischemic attack)       ICD-10: Treatment Diagnosis: cognitive communication impairment R41.841  INTERDISCIPLINARY COLLABORATION: Registered NurseASSESSMENT:RR called earlier today when patient became unresponsive while sitting in the chair next to his wife. Patient now alert and oriented. Based on the objective data described below, Mr. Sarah Samuels presents with mild cognitive deficits in the area of executive function and short-term memory. Patient with several prior CVA's. Per family, patient has had transient episodes of expressive aphasia that resolve within 24hrs. Per his daughter, he is slightly below his cognitive baseline at this time with some increased confusion noted this am.  His wife assists with higher level ADLs at home. Patient given portions of the RIPA with 80% accuracy with organization tasks and 90% accuracy with auditory comprehension. Patient was able to recall some aspects of our discussion from previous session. Per patient's wife he has no current issues with swallowing. She relayed a choking event with cornbread but upon further questioning this occurred during their first year of marriage over 20 years ago. She reports no other choking instances since that time. Patient tolerated regular textures/thin liquids previous session without difficulty.   Recommend continued speech therapy for cognition. MRI pending. Continue cardiac diet. Patient will benefit from skilled intervention to address the below impairments. ?????? ? ? This section established at most recent assessment??????????  PROBLEM LIST (Impairments causing functional limitations): 1. cognition  REHABILITATION POTENTIAL FOR STATED GOALS: Good  PLAN OF CARE:   Patient will benefit from skilled intervention to address the following impairments. INTERVENTIONS PLANNED: (Benefits and precautions of therapy have been discussed with the patient.)  1. cognition  FREQUENCY/DURATION: Continue to follow patient 3 times a week for duration of hospital stay to address above goals. RECOMMENDED REHABILITATION/EQUIPMENT: (at time of discharge pending progress): Due to the probability of continued deficits (see above) this patient will not likely need continued skilled speech therapy after discharge. SUBJECTIVE:   Pleasant and cooperative. 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 History of Present Injury/Illness: Mr. Sameera Kunz  has a past medical history of Arthritis; Asthma; Atrial flutter with rapid ventricular response ([address] (6/18/2014); Basal cell carcinoma of skin of other and unspecified parts of face (2/20/2014); Benign hypertensive heart disease without heart failure (9/29/2015); BPH (benign prostatic hyperplasia) (4/16/2013); CAD (coronary artery disease); Calculus of kidney (2/20/2014); Cancer ([address] Carotid artery stenosis with cerebral infarction ([address] (9/29/2015); Chest pain, unspecified (12/11/2013); CVA (cerebral vascular accident) ([address] (4/16/2013); Diarrhea following gastrointestinal surgery (4/16/2013); Diverticulitis (colectomy); Dizziness (6/18/2014); Generalized weakness; GERD (gastroesophageal reflux disease); Gout (4/16/2013); Heart disease, unspecified (2/20/2014); History of basal cell cancer (nose); History of CVA (cerebrovascular accident) (6/18/2014); History of kidney stones (2006); Hyperlipidemia (4/16/2013);  Hypertension; Hypertrophy of prostate with urinary obstruction and other lower urinary tract symptoms (LUTS) (2/20/2014); Hypothyroidism (4/16/2013); Incomplete bladder emptying (2/20/2014); Iron deficiency anemia (4/16/2013); NSTEMI (non-ST elevated myocardial infarction) ([facility] (12/12/2013); Osteoarthritis (4/16/2013); Platelet inhibition due to Plavix; Pure hypercholesterolemia (2/20/2014); Stroke [facility]); Unspecified adverse effect of anesthesia; Unspecified asthma (2/20/2014); Unspecified disorder of thyroid (2/20/2014); Urinary frequency (2/20/2014); and Urinary tract infection, site not specified (11/20/2013). He also has no past medical history of Difficult intubation. He also  has a past surgical history that includes hx cholecystectomy; hx small bowel resection; pr removal of kidney stone (05/2016); hx carotid endarterectomy (Right, 2011); hx appendectomy (age 15); hx cataract removal (Bilateral, 2006); hx tonsillectomy; hx adenoidectomy; pr cabg, artery-vein, four (2005); pr left heart cath,percutaneous (12/12/2013); hx other surgical (3/12/14); hx coronary stent placement; pr total knee arthroplasty (2007); and pr total knee arthroplasty (2012). Present Symptoms:   Pain Intensity 1: 0  Pain Location 1: Knee  Pain Orientation 1: Right  Pain Intervention(s) 1: Medication (see MAR)  Current Medications:   No current facility-administered medications on file prior to encounter. Current Outpatient Prescriptions on File Prior to Encounter   Medication Sig Dispense Refill    escitalopram oxalate (LEXAPRO) 10 mg tablet TAKE 1 TABLET BY MOUTH EVERY MORNING. 90 Tab 3    pantoprazole (PROTONIX) 40 mg tablet TAKE 1 TABLET BY MOUTH DAILY. 90 Tab 3    simvastatin (ZOCOR) 20 mg tablet TAKE 1 TAB BY MOUTH NIGHTLY. 90 Tab 3    niacin (NIASPAN) 1,000 mg Tb24 tab TAKE 1 TAB BY MOUTH NIGHTLY.  90 Tab 3    levothyroxine (SYNTHROID) 150 mcg tablet TAKE 1 TABLET EVERY DAY BEFORE BREAKFAST 90 Tab 1    carvedilol (COREG) 3.125 mg tablet TAKE 1 TABLET TWICE DAILY. 180 Tab 3    clopidogrel (PLAVIX) 75 mg tab Take 1 Tab by mouth daily. 90 Tab 3    potassium citrate (UROCIT-K10) 10 mEq (1,080 mg) TbER TAKE 2 TABLETS TWICE DAILY 360 Tab 4    nitroglycerin (NITROSTAT) 0.4 mg SL tablet by SubLINGual route every five (5) minutes as needed for Chest Pain.  aspirin delayed-release 81 mg tablet Take 81 mg by mouth daily.  ferrous sulfate (IRON) 325 mg (65 mg iron) tablet Take 65 mg by mouth Daily (before breakfast).        Current Dietary Status:  cardiac    Social History/Home Situation: home with wife   Home Environment: Private residence  # Steps to Enter: 0  One/Two Story Residence: One story  Living Alone: No  Support Systems: Spouse/Significant Other/Partner, Child(nigel)  Patient Expects to be Discharged to[de-identified] Private residence  Current DME Used/Available at Home: Walker, rolling  Work/Activity History: owned a shop  OBJECTIVE:   Oral Motor Structure/Speech:  Oral-Motor Structure/Motor Speech  Labial: No impairment  Dentition: Upper & lower dentures  Lingual: No impairment    SPEECH-LANGUAGE COGNITIVE EVALUATION  Tests Given: RIPA-G    Mental Status:  Neurologic State: Alert  Orientation Level: Oriented to person  Cognition: Follows commands  Perception: Appears intact  Perseveration: No perseveration noted  Safety/Judgement: Awareness of environment    Motor Speech:   WFL          Neuro-Linguistics:  Verbal Reasoning Tasks: No Impairment           Verbal Organization: Impaired         Memory: Impaired          Pragmatics:  Pragmatics Impairment: No impairment       Assessment/Reassessment only, no treatment provided today    Tool Used: Functional [place] Measure (FIMTM)   Score Comments   Eating       Comprehension       Expression       Social Interaction       Problem Solving       Memory  5        Score:  Initial: 5 Most Recent: X (Date: -- )   Interpretation of Tool: Provides a uniform system of measurement for disability based on the International Classification of Impairment, Disabilities and Handicaps; measures the level of a patient's disability and indicates how much assistance is required for the individual to carry out activities of daily living. Score 7 6 5 4 3 2 1   Modifier CH CI CJ CK CL CM CN   ? Memory:     - CURRENT STATUS: CJ - 20%-39% impaired, limited or restricted    - GOAL STATUS:  CI - 1%-19% impaired, limited or restricted    - D/C STATUS:  ---------------To be determined---------------  [place] / [place] / Product Type: Medicare /   __________________________________________________________________________________________________  Safety:   After treatment position/precautions:  · Family at bedside  · Upright in Bed    Progression/Medical Necessity:   · Skilled intervention continues to be required due to decreased cognitive skills. Compliance with Program/Exercises: Will assess as treatment progresses. Reason for Continuation of Services/Other Comments:  · Patient continues to require skilled intervention due to patient unable to attend/participate in therapy as expected. Recommendations/Intent for next treatment session: \"Treatment next visit will focus on advancements to more challenging activities\".     Total Treatment Duration:  Time In: 1315  Time Out: [address] [facility] [place], CCC-SLP

## 2018-03-28 NOTE — PHYSICIAN ADVISORY
Letter of Determination: Upgrade from Observation to Inpatient Status    This patient was originally hospitalized as Outpatient Status with Observation Services on 3/26/2018 for evaluation of transient ischemic attack. This patient now meets for Inpatient Admission in accordance with CMS regulation Section 43 .3. Specifically, patient's stay is now over Two Midnights and was medically necessary. The patient's stay was medically necessary based on advanced age, and the patient experienced an episode of acute unresponsive event requiring code status without advanced cardiac life support. Consistent with CMS guidelines, patient meets for inpatient status. It is our recommendation that this patient's hospitalization status should be upgraded from OBSERVATION to INPATIENT status.      The final decision regarding the patient's hospitalization status depends on the attending physician's judgement.     Ayaan Swain MD, SREEKANTH,   Physician Raji Jack.

## 2018-03-28 NOTE — PROGRESS NOTES
Spoke to pt's nurse Sujata Helms) @ [phone] her new MRI camera would not be here tonight-she will relay info to Dr. Melanie Rivera.

## 2018-03-28 NOTE — PROGRESS NOTES
Paperwork complete for patient to go to [address] [place] [address] for MRI and back to [facility]. Patient's pick-up time is 1715. [facility] may reach [facility] at [phone] to arrange pick-up from [address] back to [facility] when procedure is complete.

## 2018-03-28 NOTE — PROGRESS NOTES
responded to [employer] that turned to a RR. Team was working with pt when  arrived. Pt's wife was in the christy and  visited and stayed with her supporting her through the Response. Pt's wife spoke of their 21 yr marriage, her previous marriages and the deaths of those spouses. Pt responded to Team efforts and remained in his current room.  provided spiritual care through presence, pastoral conversation, active listening, supportive responses, and assurance of prayer.  and wife of pt went into the room follow the Response and visited with the pt. Pt was alert and verbal.   Pt appeared comfortable with no pain level expressed or observed. Pt stated that he was feeling better.  provided spiritual care to pt through presence, pastoral conversation, and assurance of prayer.

## 2018-03-28 NOTE — PROGRESS NOTES
Hospitalist Progress Note    3/28/2018  Admit Date: 3/26/2018  3:44 PM   NAME: Stephanie Campbell   :  1935   MRN:  [id number]   Attending: Shine Ly MD  PCP:  Bhavana Caceres MD    SUBJECTIVE:   As previously documented:  \"Patient presented with confused and dysarthria. CT head neg for intracranial bleeding. MRI camera is broken and pending repair at this time. Symptoms are improving. Pt denies any Ha, nausea, cp, sob. \"        Review of Systems negative with exception of pertinent positives noted above  PHYSICAL EXAM     Visit Vitals    /74    Pulse (!) 54    Temp 98.6 °F (37 °C)    Resp 18    Ht 6' 1\" (1.854 m)    Wt 90.7 kg (200 lb)    SpO2 92%    BMI 26.39 kg/m2      Temp (24hrs), Av.1 °F (36.7 °C), Min:97.6 °F (36.4 °C), Max:98.6 °F (37 °C)    Oxygen Therapy  O2 Sat (%): 92 % (18 1211)  Pulse via Oximetry: 62 beats per minute (18 1829)  O2 Device: Room air (18 0800)    Intake/Output Summary (Last 24 hours) at 18 1600  Last data filed at 18 1134   Gross per 24 hour   Intake                0 ml   Output                1 ml   Net               -1 ml      General: No acute distress    Lungs:  CTA Bilaterally.    Heart:  Regular rate and rhythm,  No murmur, rub, or gallop  Abdomen: Soft, Non distended, Non tender, Positive bowel sounds  Extremities: No cyanosis, clubbing or edema  Neurologic:  No focal deficits    ASSESSMENT      Active Hospital Problems    Diagnosis Date Noted    TIA (transient ischemic attack) 2018    HTN (hypertension), malignant 2018    Acute cystitis without hematuria 2018     Plan:  · Syncope- doing syncope workup- suspect seizures vs arrhthymia vs tia   · Concern for cva- mri pending  · Concern for afib or other arrhthymia continue on tele  · Concern for seizures- place on keppra empirically - eeg  · Concern for acs- trend trops  · Monitor an correct electrolytes  · Continue asa, statin  · Camera on MRI here still not fixed- will transfer pt to [facility] to have the study done there  · Echo pending, follow results  · Known hx of carotid stenosis. Single kidney and does not wish to pursue cta. Follows with vascular  · PT/OT  · Further w/up and mgt based on clinical course.     DVT Prophylaxis: heparin    Signed By: Jerardo Denise MD     March 28, 2018

## 2018-03-29 VITALS
SYSTOLIC BLOOD PRESSURE: 168 MMHG | TEMPERATURE: 97.5 F | OXYGEN SATURATION: 96 % | BODY MASS INDEX: 26.51 KG/M2 | DIASTOLIC BLOOD PRESSURE: 78 MMHG | RESPIRATION RATE: 19 BRPM | WEIGHT: 200 LBS | HEART RATE: 65 BPM | HEIGHT: 73 IN

## 2018-03-29 PROBLEM — R56.9 SEIZURES (HCC): Status: ACTIVE | Noted: 2018-03-29

## 2018-03-29 PROBLEM — R55 SYNCOPE AND COLLAPSE: Status: ACTIVE | Noted: 2018-03-29

## 2018-03-29 PROBLEM — G45.9 TIA (TRANSIENT ISCHEMIC ATTACK): Status: RESOLVED | Noted: 2018-03-26 | Resolved: 2018-03-29

## 2018-03-29 LAB
ANION GAP SERPL CALC-SCNC: 8 MMOL/L (ref 7–16)
BACTERIA SPEC CULT: ABNORMAL
BUN SERPL-MCNC: 23 MG/DL (ref 8–23)
CALCIUM SERPL-MCNC: 8.9 MG/DL (ref 8.3–10.4)
CHLORIDE SERPL-SCNC: 110 MMOL/L (ref 98–107)
CO2 SERPL-SCNC: 24 MMOL/L (ref 21–32)
CREAT SERPL-MCNC: 1.58 MG/DL (ref 0.8–1.5)
GLUCOSE SERPL-MCNC: 103 MG/DL (ref 65–100)
MM INDURATION POC: 0 MM (ref 0–5)
POTASSIUM SERPL-SCNC: 3.8 MMOL/L (ref 3.5–5.1)
PPD POC: NEGATIVE NEGATIVE
SERVICE CMNT-IMP: ABNORMAL
SODIUM SERPL-SCNC: 142 MMOL/L (ref 136–145)

## 2018-03-29 PROCEDURE — 74011250636 HC RX REV CODE- 250/636: Performed by: INTERNAL MEDICINE

## 2018-03-29 PROCEDURE — 80048 BASIC METABOLIC PNL TOTAL CA: CPT | Performed by: INTERNAL MEDICINE

## 2018-03-29 PROCEDURE — 97530 THERAPEUTIC ACTIVITIES: CPT

## 2018-03-29 PROCEDURE — 74011250637 HC RX REV CODE- 250/637: Performed by: INTERNAL MEDICINE

## 2018-03-29 PROCEDURE — 95816 EEG AWAKE AND DROWSY: CPT | Performed by: INTERNAL MEDICINE

## 2018-03-29 PROCEDURE — 36415 COLL VENOUS BLD VENIPUNCTURE: CPT | Performed by: INTERNAL MEDICINE

## 2018-03-29 RX ORDER — LEVETIRACETAM 500 MG/1
500 TABLET ORAL 2 TIMES DAILY
Qty: 60 TAB | Refills: 0 | Status: SHIPPED
Start: 2018-03-29 | End: 2018-05-09 | Stop reason: SDUPTHER

## 2018-03-29 RX ORDER — BISACODYL 5 MG
5 TABLET, DELAYED RELEASE (ENTERIC COATED) ORAL
Qty: 10 TAB | Refills: 0 | Status: SHIPPED
Start: 2018-03-29 | End: 2018-06-14

## 2018-03-29 RX ORDER — CARVEDILOL 6.25 MG/1
6.25 TABLET ORAL 2 TIMES DAILY WITH MEALS
Qty: 60 TAB | Refills: 0 | Status: SHIPPED
Start: 2018-03-29 | End: 2018-05-09 | Stop reason: SDUPTHER

## 2018-03-29 RX ORDER — HYDRALAZINE HYDROCHLORIDE 20 MG/ML
10 INJECTION INTRAMUSCULAR; INTRAVENOUS
Qty: 1 VIAL | Refills: 0 | Status: SHIPPED
Start: 2018-03-29 | End: 2018-06-14

## 2018-03-29 RX ORDER — PRAVASTATIN SODIUM 80 MG/1
80 TABLET ORAL
Qty: 30 TAB | Refills: 0 | Status: SHIPPED
Start: 2018-03-29 | End: 2018-05-09 | Stop reason: SDUPTHER

## 2018-03-29 RX ORDER — ASPIRIN 325 MG
325 TABLET ORAL DAILY
Qty: 30 TAB | Refills: 0 | Status: SHIPPED
Start: 2018-03-30 | End: 2018-07-09 | Stop reason: SDUPTHER

## 2018-03-29 RX ADMIN — NIACIN 1000 MG: 500 TABLET, EXTENDED RELEASE ORAL at 10:43

## 2018-03-29 RX ADMIN — HYDRALAZINE HYDROCHLORIDE 10 MG: 20 INJECTION INTRAMUSCULAR; INTRAVENOUS at 00:27

## 2018-03-29 RX ADMIN — CLOPIDOGREL BISULFATE 75 MG: 75 TABLET ORAL at 10:42

## 2018-03-29 RX ADMIN — Medication 10 ML: at 05:35

## 2018-03-29 RX ADMIN — PANTOPRAZOLE SODIUM 40 MG: 40 TABLET, DELAYED RELEASE ORAL at 05:34

## 2018-03-29 RX ADMIN — LEVETIRACETAM 500 MG: 500 TABLET ORAL at 10:43

## 2018-03-29 RX ADMIN — ESCITALOPRAM OXALATE 10 MG: 10 TABLET ORAL at 10:43

## 2018-03-29 RX ADMIN — HEPARIN SODIUM 5000 UNITS: 5000 INJECTION, SOLUTION INTRAVENOUS; SUBCUTANEOUS at 05:34

## 2018-03-29 RX ADMIN — CARVEDILOL 6.25 MG: 6.25 TABLET, FILM COATED ORAL at 10:42

## 2018-03-29 RX ADMIN — ASPIRIN 325 MG ORAL TABLET 325 MG: 325 PILL ORAL at 10:43

## 2018-03-29 RX ADMIN — LEVOTHYROXINE SODIUM 150 MCG: 150 TABLET ORAL at 05:34

## 2018-03-29 NOTE — PROGRESS NOTES
Problem: Falls - Risk of  Goal: *Absence of Falls  Document Christal Fall Risk and appropriate interventions in the flowsheet.    Outcome: Progressing Towards Goal  Fall Risk Interventions:  Mobility Interventions: Bed/chair exit alarm, Patient to call before getting OOB         Medication Interventions: Bed/chair exit alarm, Patient to call before getting OOB    Elimination Interventions: Call light in reach, Bed/chair exit alarm, Patient to call for help with toileting needs    History of Falls Interventions: Bed/chair exit alarm

## 2018-03-29 NOTE — PROGRESS NOTES
Problem: Mobility Impaired (Adult and Pediatric)  Goal: *Acute Goals and Plan of Care (Insert Text)  STG:  (1.)Mr. Ольга Berman will move from supine to sit and sit to supine , scoot up and down and roll side to side with CONTACT GUARD ASSIST within 3 treatment day(s). (2.)Mr. Ольга Berman will transfer from bed to chair and chair to bed with MINIMAL ASSIST using the least restrictive device within 3 treatment day(s). (3.)Mr. Ольга Berman will ambulate with MINIMAL ASSIST for 50 feet with the least restrictive device within 3 treatment day(s). (4.)Mr. Ольга Berman will perform standing static and dynamic balance activities x 15 minutes with MINIMAL ASSIST to improve safety within 3 day(s). (5.)Mr. Ольга Berman will perform LE exercises with 1 to 2 cues for form within 3 days to improve strength for functional transfers and ambulation. LTG:  (1.)Mr. Ольга Berman will move from supine to sit and sit to supine , scoot up and down and roll side to side in bed with SUPERVISION within 7 treatment day(s). (2.)Mr. Ольга Berman will transfer from bed to chair and chair to bed with STAND BY ASSIST using the least restrictive device within 7 treatment day(s). (3.)Mr. Ольга Berman will ambulate with STAND BY ASSIST for 100 feet with the least restrictive device within 7 treatment day(s). (4.)Mr. Ольга Berman will perform standing static and dynamic balance activities x 25 minutes with STAND BY ASSIST to improve safety within 7 day(s).   ________________________________________________________________________________________________      PHYSICAL THERAPY: Daily Note, Treatment Day: 1st, AM 3/29/2018  INPATIENT: Hospital Day: 4  [place] / [place] / Product Type: Medicare /      NAME/AGE/GENDER: Jose Wallis is a 80 y.o. male   PRIMARY DIAGNOSIS: TIA (transient ischemic attack)  TIA (transient ischemic attack) TIA (transient ischemic attack) TIA (transient ischemic attack)        ICD-10: Treatment Diagnosis:    · Difficulty in walking, Not elsewhere classified (R26.2)   Precaution/Allergies:  Review of patient's allergies indicates no known allergies. ASSESSMENT:     Mr. Umang Hernandez is a 80 y.o. admitted for TIA workup. He lives with spouse in a single story home and reports increased falls in the past few weeks. He typically ambulates with a rolling walker. Has a history of CVA in 2013 and multiple hospitalizations. Sitting edge of bed and agreeable to therapy. Stood with CGA and increased ambulation distance to 120' x 2 with standing rest break and cues for safety during gait. Continues to exhibit impaired balance, however did require less assistance this session and exhibits improved gait mechanics. He continues to be a risk of falling. Justin Jacob is currently functioning below his baseline and would benefit from skilled PT during acute care stay to maximize safety and independence with functional mobility. This section established at most recent assessment   PROBLEM LIST (Impairments causing functional limitations):  1. Decreased Strength  2. Decreased ADL/Functional Activities  3. Decreased Transfer Abilities  4. Decreased Ambulation Ability/Technique  5. Decreased Balance  6. Decreased Knowledge of Precautions  7. Decreased [place] with Home Exercise Program   INTERVENTIONS PLANNED: (Benefits and precautions of physical therapy have been discussed with the patient.)  1. Balance Exercise  2. Bed Mobility  3. Family Education  4. Gait Training  5. Home Exercise Program (HEP)  6. Therapeutic Activites  7. Therapeutic Exercise/Strengthening  8. Transfer Training  9. Patient Education  10.  Group Therapy     TREATMENT PLAN: Frequency/Duration: 3 times a week for duration of hospital stay  Rehabilitation Potential For Stated Goals: Good     RECOMMENDED REHABILITATION/EQUIPMENT: (at time of discharge pending progress): Due to the probability of continued deficits (see above) this patient will likely need continued skilled physical therapy after discharge. Equipment:    None at this time              HISTORY:   History of Present Injury/Illness (Reason for Referral):  Per MD: Yeimy Pathak is an 80 y.o. yo WM, with a pmh of HTN, CVA, CAD status post CABG and hypothyroidism, who presents to the ED via EMS for acute onset confusion, difficulty speaking and decreased  of his right hand. These symptoms resolved after about thirty minutes. Per EMS he had a run of an irregular heartbeat en route but is currently regular. States he does not have a known history of fib/flutter but a flutter is listed in his pmh. CT head and CT cerv spine neg for acute abnormality in ED. Does endorse difficulty urinating and frequent burning/infections. UA with > 100 WBCs and 4+ bacteria. Denies sob, chest pain, nausea, fever, chills. Did fall yesterday and has significant swelling and pain to his right knee. Past Medical History/Comorbidities:   Mr. Abel Ludwig  has a past medical history of Arthritis; Asthma; Atrial flutter with rapid ventricular response ([address] (6/18/2014); Basal cell carcinoma of skin of other and unspecified parts of face (2/20/2014); Benign hypertensive heart disease without heart failure (9/29/2015); BPH (benign prostatic hyperplasia) (4/16/2013); CAD (coronary artery disease); Calculus of kidney (2/20/2014); Cancer ([address] Carotid artery stenosis with cerebral infarction ([address] (9/29/2015); Chest pain, unspecified (12/11/2013); CVA (cerebral vascular accident) ([address] (4/16/2013); Diarrhea following gastrointestinal surgery (4/16/2013); Diverticulitis (colectomy); Dizziness (6/18/2014); Generalized weakness; GERD (gastroesophageal reflux disease); Gout (4/16/2013); Heart disease, unspecified (2/20/2014); History of basal cell cancer (nose); History of CVA (cerebrovascular accident) (6/18/2014); History of kidney stones (2006); Hyperlipidemia (4/16/2013); Hypertension;  Hypertrophy of prostate with urinary obstruction and other lower urinary tract symptoms (LUTS) (2/20/2014); Hypothyroidism (4/16/2013); Incomplete bladder emptying (2/20/2014); Iron deficiency anemia (4/16/2013); NSTEMI (non-ST elevated myocardial infarction) [address] (12/12/2013); Osteoarthritis (4/16/2013); Platelet inhibition due to Plavix; Pure hypercholesterolemia (2/20/2014); Stroke [facility]); Unspecified adverse effect of anesthesia; Unspecified asthma (2/20/2014); Unspecified disorder of thyroid (2/20/2014); Urinary frequency (2/20/2014); and Urinary tract infection, site not specified (11/20/2013). He also has no past medical history of Difficult intubation. Mr. Mandi Samuels  has a past surgical history that includes hx cholecystectomy; hx small bowel resection; pr removal of kidney stone (05/2016); hx carotid endarterectomy (Right, 2011); hx appendectomy (age 15); hx cataract removal (Bilateral, 2006); hx tonsillectomy; hx adenoidectomy; pr cabg, artery-vein, four (2005); pr left heart cath,percutaneous (12/12/2013); hx other surgical (3/12/14); hx coronary stent placement; pr total knee arthroplasty (2007); and pr total knee arthroplasty (2012). Social History/Living Environment:   Home Environment: Private residence  # Steps to Enter: 0  One/Two Story Residence: One story  Living Alone: No  Support Systems: Spouse/Significant Other/Partner, Child(nigel)  Patient Expects to be Discharged to[de-identified] Private residence  Current DME Used/Available at Home: Walker, rolling  Prior Level of Function/Work/Activity:  Patient ambulated with rolling walker, reports increased falls recently.      Number of Personal Factors/Comorbidities that affect the Plan of Care: 3+: HIGH COMPLEXITY   EXAMINATION:   Most Recent Physical Functioning:   Gross Assessment:  AROM: Generally decreased, functional  PROM: Generally decreased, functional  Strength: Generally decreased, functional  Coordination: Generally decreased, functional  Sensation: Intact               Posture:  Posture (WDL): Exceptions to WDL  Posture Assessment: Forward head  Balance:  Sitting: Intact  Standing: Impaired  Standing - Static: Fair  Standing - Dynamic : Fair (-) Bed Mobility:     Wheelchair Mobility:     Transfers:  Sit to Stand: Contact guard assistance  Stand to Sit: Contact guard assistance  Bed to Chair: Minimum assistance  Interventions: Safety awareness training;Manual cues; Verbal cues; Visual cues  Gait:     Base of Support: Center of gravity altered; Widened  Speed/Jaida: Slow;Shuffled;Pace decreased (<100 feet/min)  Step Length: Right shortened;Left shortened  Gait Abnormalities: Decreased step clearance;Trunk sway increased; Path deviations  Distance (ft): 120 Feet (ft) (x2)  Assistive Device: Walker, rolling;Gait belt  Ambulation - Level of Assistance: Minimal assistance  Interventions: Safety awareness training;Verbal cues; Visual/Demos      Body Structures Involved:  1. Nerves  2. Bones  3. Joints  4. Muscles  5. Ligaments Body Functions Affected:  1. Sensory/Pain  2. Neuromusculoskeletal  3. Movement Related Activities and Participation Affected:  1. Mobility  2. Self Care  3. Domestic Life  4. Interpersonal Interactions and Relationships  5. Community, Social and [place]   Number of elements that affect the Plan of Care: 4+: HIGH COMPLEXITY   CLINICAL PRESENTATION:   Presentation: Evolving clinical presentation with changing clinical characteristics: MODERATE COMPLEXITY   CLINICAL DECISION MAKIN [facility] Inpatient Short Form  How much difficulty does the patient currently have. .. Unable A Lot A Little None   1. Turning over in bed (including adjusting bedclothes, sheets and blankets)? [] 1   [x] 2   [] 3   [] 4   2. Sitting down on and standing up from a chair with arms ( e.g., wheelchair, bedside commode, etc.)   [] 1   [x] 2   [] 3   [] 4   3. Moving from lying on back to sitting on the side of the bed?    [] 1   [x] 2   [] 3   [] 4   How much help from another person does the patient currently need. .. Total A Lot A Little None   4. Moving to and from a bed to a chair (including a wheelchair)? [] 1   [x] 2   [] 3   [] 4   5. Need to walk in hospital room? [] 1   [x] 2   [] 3   [] 4   6. Climbing 3-5 steps with a railing? [] 1   [x] 2   [] 3   [] 4   © 2007, Trustees of [address] [place] [address] [address] [id number], under license to [employer]. All rights reserved      Score:  Initial: 12 Most Recent: X (Date: -- )    Interpretation of Tool:  Represents activities that are increasingly more difficult (i.e. Bed mobility, Transfers, Gait). Score 24 23 22-20 19-15 14-10 9-7 6     Modifier CH CI CJ CK CL CM CN      ? Mobility - Walking and Moving Around:     - CURRENT STATUS: CL - 60%-79% impaired, limited or restricted    - GOAL STATUS: CK - 40%-59% impaired, limited or restricted    - D/C STATUS:  ---------------To be determined---------------  [place] / [place] / Product Type: Medicare /      Medical Necessity:     · Patient demonstrates good rehab potential due to higher previous functional level. Reason for Services/Other Comments:  · Patient continues to require modification of therapeutic interventions to increase complexity of exercises. Use of outcome tool(s) and clinical judgement create a POC that gives a: Questionable prediction of patient's progress: MODERATE COMPLEXITY            TREATMENT:   (In addition to Assessment/Re-Assessment sessions the following treatments were rendered)   Pre-treatment Symptoms/Complaints:  none  Pain: Initial:   Pain Intensity 1: 0 0/10 Post Session:  0/10     Therapeutic Activity: (    25  minutes): Therapeutic activities including Bed transfers, Chair transfers and Ambulation on level ground to improve mobility, strength, balance and coordination. Required minimal Safety awareness training;Verbal cues; Visual/Demos to promote static and dynamic balance in standing.      Braces/Orthotics/Lines/Etc:   · IV  · O2 Device: Room air  Treatment/Session Assessment:    · Response to Treatment:  Patient tolerated with minimally increased R knee pain, however subsided at rest.  · Interdisciplinary Collaboration:   o Physical Therapist  o Registered Nurse  · After treatment position/precautions:   o Up in chair  o Bed alarm/tab alert on  o Bed/Chair-wheels locked  o Bed in low position  o Call light within reach  o RN notified  o Family at bedside   · Compliance with Program/Exercises: Will assess as treatment progresses. · Recommendations/Intent for next treatment session: \"Next visit will focus on advancements to more challenging activities and reduction in assistance provided\".   Total Treatment Duration:  PT Patient Time In/Time Out  Time In: 1041  Time Out: 235 [facility]

## 2018-03-29 NOTE — PROGRESS NOTES
Hospitalist Progress Note    3/29/2018  Admit Date: 3/26/2018  3:44 PM   NAME: Gayle Shay   :  1935   MRN:  [id number]   Attending: Jo Anguiano MD  PCP:  Rudolph Alcaraz MD    SUBJECTIVE:   As previously documented:  \"Patient presented with confused and dysarthria. CT head neg for intracranial bleeding. MRI camera is broken and pending repair at this time. Symptoms are improving. Pt denies any Ha, nausea, cp, sob. \"    2018- Pt had a syncopal episode with what looked liked seizure activity    2018- Pt seen - MRI shows left parietal lobe infarct slow flow in the left carotid    Review of Systems negative with exception of pertinent positives noted above  PHYSICAL EXAM     Visit Vitals    /68 (BP 1 Location: Right arm, BP Patient Position: At rest)    Pulse 66    Temp 98 °F (36.7 °C)    Resp 17    Ht 6' 1\" (1.854 m)    Wt 90.7 kg (200 lb)    SpO2 96%    BMI 26.39 kg/m2      Temp (24hrs), Av.3 °F (36.8 °C), Min:98 °F (36.7 °C), Max:98.6 °F (37 °C)    Oxygen Therapy  O2 Sat (%): 96 % (18 0749)  Pulse via Oximetry: 62 beats per minute (18 1829)  O2 Device: Room air (18 5264)    Intake/Output Summary (Last 24 hours) at 18 0756  Last data filed at 18   Gross per 24 hour   Intake                0 ml   Output              176 ml   Net             -176 ml      General: No acute distress    Lungs:  CTA Bilaterally. Heart:  Regular rate and rhythm,  No murmur, rub, or gallop  Abdomen: Soft, Non distended, Non tender, Positive bowel sounds  Extremities: No cyanosis, clubbing or edema  Neurologic:  No focal deficits    ASSESSMENT      Active Hospital Problems    Diagnosis Date Noted    Syncope and collapse 2018    Seizures ([facility] 2018     ?  Secondary to CVA      HTN (hypertension), malignant 2018    Acute cystitis without hematuria 2018    CVA (cerebrovascular accident) ([facility] 2013     Overview:   x4-last one 5/2016       Plan:  · Syncope- doing syncope workup- suspect seizures vs arrhthymia vs tia   · CVA on mri- left parietal - continue secondary ppx  · Concern for afib or other arrhthymia continue on tele  · Concern for seizures- continue on keppra empirically - eeg; neurology consult  · Concern for acs- ruled out  · UTI- continue abx- culture showing MDR organism- will d/w ID  · Monitor an correct electrolytes  · Continue asa, statin  · Known hx of carotid stenosis. Single kidney and does not wish to pursue cta. Follows with vascular at [facility]  · PT/OT- pt states he would prefer home rehab  · Further w/up and mgt based on clinical course.     DVT Prophylaxis: heparin    Signed By: Thao Rubin MD     March 29, 2018

## 2018-03-29 NOTE — DISCHARGE SUMMARY
Hospitalist Discharge Summary     Admit Date:  3/26/2018  3:44 PM   Name:  Justin Jacob   Age:  80 y.o.  :  1935   MRN:  [id number]   PCP:  Dennise Tyler MD  Treatment Team: Attending Provider: Miriam Esparza MD; Consulting Provider: Keo Mcclellan MD; Care Manager: Stone Presley, RN; Utilization Review: Alexey Sheikh; Consulting Provider: Felisa Donnelly MD    Problem List for this Hospitalization:  Hospital Problems as of 3/29/2018  Date Reviewed: 2017          Codes Class Noted - Resolved POA    Syncope and collapse ICD-10-CM: R55  ICD-9-CM: 780.2  3/29/2018 - Present Clinically Undetermined        Seizures ([facility] [address] ICD-10-CM: R56.9  ICD-9-CM: 780.39  3/29/2018 - Present Unknown    Overview Signed 3/29/2018  8:05 AM by Josselin John MD     ? Secondary to CVA             HTN (hypertension), malignant ICD-10-CM: I10  ICD-9-CM: 401.0  3/26/2018 - Present Yes        Acute cystitis without hematuria ICD-10-CM: N30.00  ICD-9-CM: 595.0  3/26/2018 - Present Yes        CVA (cerebrovascular accident) [facility]) ICD-10-CM: I63.9  ICD-9-CM: 434.91  2013 - Present Yes    Overview Signed 10/4/2017  4:24 PM by Eliz Ferrer MD     Overview:   x4-last one 2016             * (Principal)RESOLVED: TIA (transient ischemic attack) ICD-10-CM: G45.9  ICD-9-CM: 435.9  3/26/2018 - 3/29/2018 Unknown                Admission HPI from 3/26/2018:    \" Justin Jacob is an 80 y.o. yo WM, with a pmh of HTN, CVA, CAD status post CABG and hypothyroidism, who presents to the ED via EMS for acute onset confusion, difficulty speaking and decreased  of his right hand. These symptoms resolved after about thirty minutes. Per EMS he had a run of an irregular heartbeat en route but is currently regular. States he does not have a known history of fib/flutter but a flutter is listed in his pmh. CT head and CT cerv spine neg for acute abnormality in ED.   Does endorse difficulty urinating and frequent burning/infections. UA with > 100 WBCs and 4+ bacteria. Denies sob, chest pain, nausea, fever, chills. Did fall yesterday and has significant swelling and pain to his right knee. \"    Hospital Course: The patient was admitted for TIA vs CVA workup. He and his wife refused CTA given his history of a renal insufficiency and based on the fact that they were told he was not a candidate for left carotid surgery secondary to his heart. His aspirin was increased to 325 mg po daily and he was started on pravastatin 80 mg qhs and simvastatin 20mg qhs was discontinued. Plavix was continued. MRI was delayed secondary to MRI machine being down. On 03/28/2018- the patient was reportedly having lunch when he slumped to the right side and appeared to not be breathing. He was also noted to have some twitching of his upper body. A code was called and by the time staff arrived he was noted to be breathing but non- responsive. This resolved after several seconds. No acute event noted on telemetry. Vital signs were normal. BG normal. No incontinence noted. He was started empirically on Keppra. He was transferred to [facility] to have his MRI. Orthostatics were negative. The MRI showed a punctate acute vs subacute high parietal lobe cva and slow flow in the left carotid vs occlusion. Telemetry was continued, neurology consulted and EEG ordered. The patient was seen by our Neurologist, Dr. Saundra John today and was recommended for transfer to his vascular surgeon for consideration of emergent ICA Endarectomy or angioplasty given concern for symptomatic carotid stenosis. Case d/w Dr. Srini Juan at [facility] who is the patients vascular surgeon and he has asked the pt be transferred to neurology service at [facility] and he will follow him up there. I spoke with Dr Taye Kong of neurology and pt will be transferred to his service for further evaluation and management.  Dr. Taye Kong has asked that Eliquis be held at this time until the patient is seen at [facility]. Radiology department contacted to push all of the patients imaging to [facility]. Follow up instructions below. Plan was discussed with the patient and his wife. All questions answered. Please call if any further questions or concerns regarding his admission here. Diagnostic Imaging/Tests:   Xr Chest Sngl V    Result Date: 3/28/2018  History: Syncopal episode today. History of TIA. Exam: portable chest Comparison: 3/15/2016 Findings: There is mild linear scarring present within the left midlung. The patient is rotated to the right. Median sternotomy wires are present. The lungs are otherwise clear. Impressions: Mild linear scarring in the left midlung. No acute abnormality. Mri Brain Wo Cont    Result Date: 3/28/2018  MRI brain without contrast: 03/28/2018 History: Acute onset confusion, syncopal episode today. Imaging sequences: Sagittal short TR/short TE, axial short TR/short TE, long TR/long TE, FLAIR, gradient recall, diffusion weighted images and ADC mapping. Coronal FLAIR. Imaging was performed on a 1.5 Cassidy magnet. Comparison: 09/14/2016 Findings: There is stable ventricular enlargement as well as prominence to the sulci likely representing volume loss although a component of normal pressure hydrocephalus could also have this appearance. There are no extra-axial fluid collections. There is a punctate focus of signal abnormality along the high left parietal region which could represent an acute or subacute infarction. There is no evidence of an acute major territorial infarct. There is abnormal signal intensity within the left internal carotid artery compatible with slow flow or occlusion which represents an interval change. Patchy and discrete of T2 prolongation are present within the supratentorial white matter. These are nonspecific findings but would be most compatible with mild chronic small vessel ischemic changes. There are few partially opacified right mastoid air cells. IMPRESSION: 1. Punctate acute or subacute infarction within the high left parietal lobe. 2. Slow flow or occlusion of the left internal carotid artery. 3. Ventriculomegaly likely on the basis of volume loss although a component of normal pressure hydrocephalus cannot be excluded. Echocardiogram results:  Results for orders placed or performed during the hospital encounter of 18   2D ECHO COMPLETE ADULT (TTE) W OR WO CONTR    Narrative    [facility]  [address] [facility], [address] [facility]  [phone]    Transthoracic Echocardiogram  2D, M-mode, Doppler, and Color Doppler    Patient: Russ Quintero  MR #: [id number]  : [id number]  Age: 80 years  Gender: Male  Study date: 28-Mar-2018  Account #: [de-identified]  Height: 72.8 in  Weight: 199.5 lb  BSA: 2.15 mï¾²  Status:Routine  Location: 722  BP: 189/ 90    Allergies: NO KNOWN ALLERGIES    Sonographer:  González Morgan RD  Group:  [id number] Cardiology  Referring Physician:  Nissa Cochran MD  Reading Physician:  Edward Grant. MD [facility]    INDICATIONS: TIA vs CVA. PROCEDURE: This was a routine study. A transthoracic echocardiogram was  performed. The study included complete 2D imaging, M-mode, complete spectral  Doppler, and color Doppler. Intravenous contrast (Definity) was administered. Intravenous contrast (agitated saline) was administered. Image quality was  adequate. LEFT VENTRICLE: The ventricle was mildly dilated. Systolic function was   normal.  Ejection fraction was estimated to be 55 %. There were no regional wall   motion  abnormalities. There was mild concentric hypertrophy. The study was not  technically sufficient to allow evaluation of LV diastolic function. RIGHT VENTRICLE: The size was normal. Systolic function was normal. Not well  visualized. Estimated peak pressure was in the range of 30-35 mmHg. LEFT ATRIUM: The atrium was moderately dilated.     ATRIAL SEPTUM: Contrast injection was performed. There was no right-to-left  shunt, with provocative maneuvers to increase right atrial pressure. RIGHT ATRIUM: The atrium was mildly dilated. SYSTEMIC VEINS: IVC: The inferior vena cava was normal in size and course. AORTIC VALVE: The valve was trileaflet. Leaflets exhibited moderate   sclerosis. There was no evidence for stenosis. There was trivial regurgitation. MITRAL VALVE: There was mild thickening. There was no evidence for stenosis. There was moderate regurgitation. TRICUSPID VALVE: The valve structure was normal. There was no evidence for  stenosis. There was mild regurgitation. PULMONIC VALVE: Not well visualized. There was no evidence for stenosis. There  was trivial regurgitation. PERICARDIUM: There was no pericardial effusion. AORTA: The root exhibited normal size. SUMMARY:    -  Left ventricle: The ventricle was mildly dilated. Systolic function was  normal. Ejection fraction was estimated to be 55 %. There were no regional   wall  motion abnormalities. There was mild concentric hypertrophy. -  Left atrium: The atrium was moderately dilated. -  Atrial septum: Contrast injection was performed. There was no   right-to-left  shunt, with provocative maneuvers to increase right atrial pressure.    -  Right atrium: The atrium was mildly dilated. -  Mitral valve: There was moderate regurgitation.    -  Tricuspid valve: There was mild regurgitation.     SYSTEM MEASUREMENT TABLES    2D mode  AoR Diam (2D): 3.4 cm  LA Dimension (2D): 5.1 cm  Left Atrium Systolic Volume Index; Method of Disks, Biplane; 2D mode;: 46.1  ml/m2  IVS/LVPW (2D): 1  IVSd (2D): 1.4 cm  LVIDd (2D): 5.5 cm  LVIDs (2D): 3.9 cm  LVOT Area (2D): 3.8 cm2  LVPWd (2D): 1.4 cm  RVIDd (2D): 3.7 cm    Tissue Doppler Imaging  LV Peak Early Knox Tissue Tony; Lateral MA (TDI): 5 cm/s  LV Peak Early Knox Tissue Tony; Medial MA (TDI): 4.3 cm/s    Unspecified Scan Mode  Peak Grad; Mean; Antegrade Flow: 13 mm[Hg]  Vmax; Antegrade Flow: 180 cm/s  LVOT Diam: 2.2 cm  MV Peak Tony/LV Peak Tissue Tony E-Wave; Lateral MA: 17.4  MV Peak Tony/LV Peak Tissue Tony E-Wave; Medial MA: 20.5  MV E/A: 1.1  MV Peak E Tony; Mean; Antegrade Flow: 87.3 cm/s    Prepared and signed by    Brandon Moran. MD [facility]  Signed 28-Mar-2018 12:33:18           All Micro Results     Procedure Component Value Units Date/Time    CULTURE, URINE [[id number]]  (Abnormal) Collected:  03/26/18 2109    Order Status:  Completed Specimen:  Urine from Clean catch Updated:  03/29/18 0704     Special Requests: NO SPECIAL REQUESTS        Culture result:         >100,000 COLONIES/mL ESCHERICHIA COLI (A)      REPEATED X 2              ** MULTI-DRUG RESISTANT ORGANISM **          Labs: Results:       BMP, Mg, Phos Recent Labs      03/29/18   0957  03/28/18   1259  03/28/18   1154  03/27/18   0535   NA  142   --   141  142   K  3.8   --   4.2  3.6   CL  110*   --   109*  109*   CO2  24   --   26  24   AGAP  8   --   6*  9   BUN  23   --   23  26*   CREA  1.58*   --   1.73*  1.57*   CA  8.9   --   9.1  8.6   GLU  103*   --   109*  86   MG   --   1.9   --   1.8   PHOS   --    --    --   3.0      CBC Recent Labs      03/28/18   1259  03/27/18   0535   WBC  8.8  9.5   RBC  4.13*  4.27   HGB  11.6*  12.0*   HCT  35.3*  36.6*   PLT  182  196   GRANS  63  57   LYMPH  22  26   EOS  8*  9*   MONOS  7  8   BASOS  0  0   IG  0  0   ANEU  5.5  5.3   ABL  2.0  2.5   SUMIT  0.7  0.9*   ABM  0.6  0.7   ABB  0.0  0.0   AIG  0.0  0.0      LFT No results for input(s): SGOT, ALT, TBIL, AP, TP, ALB, GLOB, AGRAT, GPT in the last 72 hours.    Cardiac Testing Lab Results   Component Value Date/Time     06/18/2014 01:45 PM    CK 69 03/28/2018 12:59 PM     (H) 12/11/2013 09:10 PM     (H) 12/11/2013 03:10 PM    Troponin-I, Qt. <0.02 (L) 03/28/2018 10:43 PM    Troponin-I, Qt. <0.02 (L) 03/28/2018 07:24 PM    Troponin-I, Qt. <0.02 (L) 03/28/2018 05:09 PM Coagulation Tests Lab Results   Component Value Date/Time    aPTT 44.5 (H) 06/19/2014 11:12 AM    aPTT 50.2 (H) 06/19/2014 04:00 AM    aPTT 52.0 (H) 06/18/2014 09:00 PM      A1c Lab Results   Component Value Date/Time    Hemoglobin A1c 5.7 03/27/2018 05:35 AM      Lipid Panel Lab Results   Component Value Date/Time    Cholesterol, total 133 03/27/2018 05:35 AM    HDL Cholesterol 49 03/27/2018 05:35 AM    LDL, calculated 66.2 03/27/2018 05:35 AM    VLDL, calculated 17.8 03/27/2018 05:35 AM    Triglyceride 89 03/27/2018 05:35 AM    CHOL/HDL Ratio 2.7 03/27/2018 05:35 AM      Thyroid Panel Lab Results   Component Value Date/Time    TSH 4.860 (H) 03/26/2018 08:00 PM    TSH 2.290 12/12/2017 02:57 PM        Most Recent UA Lab Results   Component Value Date/Time    Color ORANGE 05/16/2016 08:28 AM    Appearance TURBID 05/16/2016 08:28 AM    Specific gravity 1.018 05/16/2016 08:28 AM    pH (UA) 5.0 05/16/2016 08:28 AM    Protein 300 (A) 05/16/2016 08:28 AM    Glucose NEGATIVE  05/16/2016 08:28 AM    Ketone TRACE (A) 05/16/2016 08:28 AM    Bilirubin SMALL (A) 05/16/2016 08:28 AM    Blood LARGE (A) 05/16/2016 08:28 AM    Urobilinogen 1.0 05/16/2016 08:28 AM    Nitrites POSITIVE (A) 05/16/2016 08:28 AM    Leukocyte Esterase LARGE (A) 05/16/2016 08:28 AM        No Known Allergies  Immunization History   Administered Date(s) Administered    Influenza High Dose Vaccine PF 10/04/2017    Influenza Vaccine 09/17/2015    Pneumococcal Conjugate (PCV-13) 03/11/2015    Pneumococcal Polysaccharide (PPSV-23) 06/20/2016    TB Skin Test (PPD) Intradermal 05/15/2016, 03/27/2018       All Labs from Last 24 Hrs:  Recent Results (from the past 24 hour(s))   TROPONIN I    Collection Time: 03/28/18  5:09 PM   Result Value Ref Range    Troponin-I, Qt. <0.02 (L) 0.02 - 0.05 NG/ML   TROPONIN I    Collection Time: 03/28/18  7:24 PM   Result Value Ref Range    Troponin-I, Qt. <0.02 (L) 0.02 - 0.05 NG/ML   TROPONIN I    Collection Time: 03/28/18 10:43 PM   Result Value Ref Range    Troponin-I, Qt. <0.02 (L) 0.02 - 0.05 NG/ML   PLEASE READ & DOCUMENT PPD TEST IN 48 HRS    Collection Time: 03/29/18 12:29 AM   Result Value Ref Range    PPD negative Negative    mm Induration 0 mm   METABOLIC PANEL, BASIC    Collection Time: 03/29/18  9:57 AM   Result Value Ref Range    Sodium 142 136 - 145 mmol/L    Potassium 3.8 3.5 - 5.1 mmol/L    Chloride 110 (H) 98 - 107 mmol/L    CO2 24 21 - 32 mmol/L    Anion gap 8 7 - 16 mmol/L    Glucose 103 (H) 65 - 100 mg/dL    BUN 23 8 - 23 MG/DL    Creatinine 1.58 (H) 0.8 - 1.5 MG/DL    GFR est AA 54 (L) >60 ml/min/1.73m2    GFR est non-AA 45 (L) >60 ml/min/1.73m2    Calcium 8.9 8.3 - 10.4 MG/DL       Discharge Exam:  Patient Vitals for the past 24 hrs:   Temp Pulse Resp BP SpO2   03/29/18 1257 - 63 18 147/71 96 %   03/29/18 1200 97.9 °F (36.6 °C) 64 19 175/82 96 %   03/29/18 0800 98 °F (36.7 °C) 66 19 184/68 96 %   03/29/18 0749 98 °F (36.7 °C) 66 17 184/68 96 %   03/29/18 0400 98 °F (36.7 °C) 73 18 149/66 96 %   03/29/18 0014 98.4 °F (36.9 °C) 66 20 173/76 97 %   03/29/18 0001 98.3 °F (36.8 °C) 65 20 (!) 186/94 99 %   03/28/18 2000 98.6 °F (37 °C) (!) 57 20 150/71 98 %   03/28/18 1600 98.4 °F (36.9 °C) 60 18 160/78 96 %     Oxygen Therapy  O2 Sat (%): 96 % (03/29/18 1257)  Pulse via Oximetry: 62 beats per minute (03/26/18 1829)  O2 Device: Room air (03/29/18 1200)    Intake/Output Summary (Last 24 hours) at 03/29/18 1554  Last data filed at 03/29/18 1157   Gross per 24 hour   Intake              120 ml   Output              175 ml   Net              -55 ml       General:    Well nourished. Alert. No distress. Eyes:   Normal sclera. Extraocular movements intact. ENT:  Normocephalic, atraumatic. Moist mucous membranes  CV:   Regular rate and rhythm. No murmur, rub, or gallop. Lungs:  Clear to auscultation bilaterally. No wheezing, rhonchi, or rales. Abdomen: Soft, nontender, nondistended.  Bowel sounds normal. Extremities: Warm and dry. No cyanosis or edema. Neurologic: CN II-XII grossly intact. Sensation intact. Skin:     No rashes or jaundice. Psych:  Normal mood and affect. Discharge Info:   Current Discharge Medication List      START taking these medications    Details   aspirin (ASPIRIN) 325 mg tablet Take 1 Tab by mouth daily. Qty: 30 Tab, Refills: 0      bisacodyl (DULCOLAX) 5 mg EC tablet Take 1 Tab by mouth daily as needed for Constipation. Qty: 10 Tab, Refills: 0      hydrALAZINE (APRESOLINE) 20 mg/mL injection 0.5 mL by IntraVENous route every six (6) hours as needed (sbp > 160 or dbp > 90). Qty: 1 Vial, Refills: 0      levETIRAcetam (KEPPRA) 500 mg tablet Take 1 Tab by mouth two (2) times a day. Qty: 60 Tab, Refills: 0      pravastatin (PRAVACHOL) 80 mg tablet Take 1 Tab by mouth nightly. Qty: 30 Tab, Refills: 0         CONTINUE these medications which have CHANGED    Details   carvedilol (COREG) 6.25 mg tablet Take 1 Tab by mouth two (2) times daily (with meals). Qty: 60 Tab, Refills: 0         CONTINUE these medications which have NOT CHANGED    Details   escitalopram oxalate (LEXAPRO) 10 mg tablet TAKE 1 TABLET BY MOUTH EVERY MORNING. Qty: 90 Tab, Refills: 3    Associated Diagnoses: Depression, unspecified depression type      pantoprazole (PROTONIX) 40 mg tablet TAKE 1 TABLET BY MOUTH DAILY. Qty: 90 Tab, Refills: 3    Associated Diagnoses: Gastroesophageal reflux disease, esophagitis presence not specified      niacin (NIASPAN) 1,000 mg Tb24 tab TAKE 1 TAB BY MOUTH NIGHTLY. Qty: 90 Tab, Refills: 3    Associated Diagnoses: Mixed hyperlipidemia      levothyroxine (SYNTHROID) 150 mcg tablet TAKE 1 TABLET EVERY DAY BEFORE BREAKFAST  Qty: 90 Tab, Refills: 1    Associated Diagnoses: Hypothyroidism due to acquired atrophy of thyroid      clopidogrel (PLAVIX) 75 mg tab Take 1 Tab by mouth daily.   Qty: 90 Tab, Refills: 3    Associated Diagnoses: Coronary artery disease involving native coronary artery of native heart without angina pectoris      potassium citrate (UROCIT-K10) 10 mEq (1,080 mg) TbER TAKE 2 TABLETS TWICE DAILY  Qty: 360 Tab, Refills: 4    Associated Diagnoses: Nephrolithiasis      nitroglycerin (NITROSTAT) 0.4 mg SL tablet by SubLINGual route every five (5) minutes as needed for Chest Pain. aspirin delayed-release 81 mg tablet Take 81 mg by mouth daily. ferrous sulfate (IRON) 325 mg (65 mg iron) tablet Take 65 mg by mouth Daily (before breakfast). STOP taking these medications       simvastatin (ZOCOR) 20 mg tablet Comments:   Reason for Stopping:                 Disposition: Neurology service [facility]    Activity: as tolerated  Diet: DIET CARDIAC Regular    Follow-up Appointments   Procedures    FOLLOW UP VISIT Appointment in: Other (Sylvie Cochran) Today with neurology and vascular surgery     Today with neurology and vascular surgery     Standing Status:   Standing     Number of Occurrences:   1     Order Specific Question:   Appointment in     Answer: Other (Specify)         Follow-up Information     Follow up With Details Comments [address], [place]   [address] [place] [address]  [phone]            Time spent in patient discharge planning and coordination 35 minutes.     Signed:  Yennifer Cardona MD

## 2018-03-29 NOTE — PROGRESS NOTES
Patient scheduled for transport today @1715 to [facility] room # [phone] via stretcher by [facility] ambulance for vascular surgical procedure. Patient and family aware. Care Management Interventions  PCP Verified by CM: Yes  Mode of Transport at Discharge:  Other (see comment) (Patient may need aternate transport other than wife at d/c.)  Transition of Care Consult (CM Consult): Discharge Planning  Physical Therapy Consult: Yes  Occupational Therapy Consult: Yes  Current Support Network: Lives with Spouse, Own Home, Family Lives Nearby  Confirm Follow Up Transport: Family  Plan discussed with Pt/Family/Caregiver: Yes  [address] Provided?: No  Discharge Location  Discharge Placement: Other: [facility]

## 2018-03-29 NOTE — PROGRESS NOTES
Spoke with patient and wife regarding possible transfer to 9th floor per Dr. Loco Hirsch. After an extended discussion regarding the 9th floor rehab process patient and wife are [facility]. Loco Hirsch notified.

## 2018-03-29 NOTE — CONSULTS
Consult    Patient: Ángela Rinaldi MRN: [id number]  SSN: [id number]    YOB: 1935  Age: 80 y.o. Sex: male      Subjective:      Ángela Rinaldi is a 80 y.o. male who is being seen for evaluation of an episode in which he developed slurred speech, after a period of activity. He has had similar episodes in the past.  He is status post right carotid endarterectomy and is known to have an underlying stenosis in the left carotid artery. Past Medical History:   Diagnosis Date    Arthritis     Asthma     as a kid. none recent    Atrial flutter with rapid ventricular response ([address] 6/18/2014    1. Diagnosed 6/18/14: s/p ablation. 2. Echo (6/19/14): EF 45-50%. Moderate mitral regurgitation. 3. Ecardio (8/12-8/25/14): No atrial arrhythmias. 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)     Carotid artery stenosis with cerebral infarction ([address] 9/29/2015    Followed by Dr. Yosvany Ackerman.  Chest pain, unspecified 12/11/2013    CVA (cerebral vascular accident) ([address] 4/16/2013    Diarrhea following gastrointestinal surgery 4/16/2013    Diverticulitis colectomy    Dizziness 6/18/2014    Generalized weakness     GERD (gastroesophageal reflux disease)     Gout 4/16/2013    Heart disease, unspecified 2/20/2014    History of basal cell cancer nose    History of CVA (cerebrovascular accident) 6/18/2014    History of mini-strokes.      History of kidney stones 2006    Hyperlipidemia 4/16/2013    Hypertension     Hypertrophy of prostate with urinary obstruction and other lower urinary tract symptoms (LUTS) 2/20/2014    Hypothyroidism 4/16/2013    Incomplete bladder emptying 2/20/2014    Iron deficiency anemia 4/16/2013    NSTEMI (non-ST elevated myocardial infarction) [facility] 12/12/2013    Osteoarthritis 4/16/2013    Platelet inhibition due to Plavix     Pure hypercholesterolemia 2/20/2014    Stroke [facility])     x3 with the last one being 2/12    Unspecified adverse effect of anesthesia     state he woke up during heart surgery    Unspecified asthma 2/20/2014    Unspecified disorder of thyroid 2/20/2014    Urinary frequency 2/20/2014    Urinary tract infection, site not specified 11/20/2013     Past Surgical History:   Procedure Laterality Date    CABG, ARTERY-VEIN, FOUR  2005    HX ADENOIDECTOMY      HX APPENDECTOMY  age 15   [facility] HX CAROTID ENDARTERECTOMY Right 2011    HX CATARACT REMOVAL Bilateral 2006    HX CHOLECYSTECTOMY      HX CORONARY STENT PLACEMENT      HX OTHER SURGICAL  3/12/14    Plasma Button procedure of prostate    HX SMALL BOWEL RESECTION      HX TONSILLECTOMY      [facility] LEFT HEART CATH,PERCUTANEOUS  12/12/2013    bmsx2 svg to !st obtuse marginal    REMOVAL OF KIDNEY STONE  05/2016    TOTAL KNEE ARTHROPLASTY  2007    right    TOTAL KNEE ARTHROPLASTY  2012    left      Family History   Problem Relation Age of Onset    Heart Disease Father     Heart Attack Father     Stroke Sister     Cancer Sister      brain    Heart Disease Mother     Bleeding Prob Mother 80     breast cancer     Social History   Substance Use Topics    Smoking status: Former Smoker     Types: Cigars     Quit date: 1/1/2002    Smokeless tobacco: Never Used      Comment: quit 2002- cigars    Alcohol use No      Comment: rare      Current Facility-Administered Medications   Medication Dose Route Frequency Provider Last Rate Last Dose    cefepime (MAXIPIME) 1 g in 0.9% sodium chloride (MBP/ADV) 50 mL ADV  1 g IntraVENous Q24H Daisy Cote MD   1 g at 03/28/18 1529    levETIRAcetam (KEPPRA) tablet 500 mg  500 mg Oral BID Daisy Cote MD   500 mg at 03/29/18 1043    carvedilol (COREG) tablet 6.25 mg 6.25 mg Oral BID WITH MEALS Miriam Esparza MD   6.25 mg at 03/29/18 1042    clopidogrel (PLAVIX) tablet 75 mg  75 mg Oral DAILY Miriam Esparza MD   75 mg at 03/29/18 1042    escitalopram oxalate (LEXAPRO) tablet 10 mg  10 mg Oral DAILY Miriam Esparza MD   10 mg at 03/29/18 1043    levothyroxine (SYNTHROID) tablet 150 mcg  150 mcg Oral ACB Miriam Esparza MD   150 mcg at 03/29/18 0534    niacin ER (NIASPAN) tablet 1,000 mg  1,000 mg Oral DAILY Miriam Esparza MD   1,000 mg at 03/29/18 1043    pantoprazole (PROTONIX) tablet 40 mg  40 mg Oral ACB Miriam Esparza MD   40 mg at 03/29/18 0534    sodium chloride (NS) flush 5-10 mL  5-10 mL IntraVENous Q8H Miriam Esparza MD   10 mL at 03/29/18 0535    sodium chloride (NS) flush 5-10 mL  5-10 mL IntraVENous PRN Miriam Esparza MD        [facility]ans[facility] injection 4 mg  4 mg IntraVENous Q6H PRN Miriam Esparza MD        aspirin (ASPIRIN) tablet 325 mg  325 mg Oral DAILY Miriam Esparza MD   325 mg at 03/29/18 1043    pravastatin (PRAVACHOL) tablet 80 mg  80 mg Oral QHS Miriam Esparza MD   80 mg at 03/28/18 2054    acetaminophen (TYLENOL) tablet 650 mg  650 mg Oral Q4H PRN Miriam Esparza MD   650 mg at 03/27/18 0114    bisacodyl (DULCOLAX) tablet 5 mg  5 mg Oral DAILY PRN Miriam Esparza MD        heparin (porcine) injection 5,000 Units  5,000 Units SubCUTAneous Q8H Miriam Esparza MD   5,000 Units at 03/29/18 0534    hydrALAZINE (APRESOLINE) 20 mg/mL injection 10 mg  10 mg IntraVENous Q6H PRN Miriam Esparza MD   10 mg at 03/29/18 0027        No Known Allergies    Review of Systems:  No additional features elicited    Objective:     Vitals:    03/29/18 0014 03/29/18 0400 03/29/18 0749 03/29/18 0800   BP: 173/76 149/66 184/68 184/68   Pulse: 66 73 66 66   Resp: 20 18 17 19   Temp: 98.4 °F (36.9 °C) 98 °F (36.7 °C) 98 °F (36.7 °C) 98 °F (36.7 °C)   SpO2: 97% 96% 96% 96%   Weight: Height:            Physical Exam:  He is alert and cooperative. He is euthymic. No carotid bruits. Cognitively he is oriented. Speech is currently normal.    Cranial nerves    2 the visual fields are normal to finger waving confrontation    346 extraocular movements are full there is no ptosis is noted lid lag no nystagmus    57 facial sensation and facial movement are symmetrical    8 hearing is intact to vibration bilaterally    9 through 12 lower cranial nerves normal tongue is midline in the mouth    The motor examination discloses reasonable strength proximally and distally in the upper and lower extremities without focal drift. The deep tendon reflexes are 2+ and symmetric, with attenuation distally in the lower extremities    Finger to nose and heel to knee to shin testing no focal dystaxia    Assessment:   Multifaceted problem with his multi-distribution vascular disease the probable presence of a underlying degenerative cerebral process but functionally very intact at the present time. Risk of stroke and adverse event extremely high.   It would appear that he has had a episode which we would have clinically called a TIA, but has clearly showing some evidence on MRI of fixed ischemia  Hospital Problems  Date Reviewed: 12/12/2017          Codes Class Noted POA    Syncope and collapse ICD-10-CM: R55  ICD-9-CM: 780.2  3/29/2018 Clinically Undetermined        Seizures ([facility] ICD-10-CM: R56.9  ICD-9-CM: 780.39  3/29/2018 Unknown    Overview Signed 3/29/2018  8:05 AM by Evelyn Britton MD     ? Secondary to CVA             HTN (hypertension), malignant ICD-10-CM: I10  ICD-9-CM: 401.0  3/26/2018 Yes        Acute cystitis without hematuria ICD-10-CM: N30.00  ICD-9-CM: 595.0  3/26/2018 Yes        CVA (cerebrovascular accident) [facility]) ICD-10-CM: I63.9  ICD-9-CM: 434.91  4/16/2013 Yes    Overview Signed 10/4/2017  4:24 PM by Margarito Olea MD     Overview:   x4-last one 5/2016                   Plan: I would recommend a vascular consultation if he is not felt to be a candidate for an open carotid endarterectomy then I believe he should be transferred and offered the option of carotid angioplasty secondary to the low flow demonstrated in the intracranial circulation on the left side,and the MRI finding with diffusion-weighted imaging consistent with the finding of ischemia in the distal circulation, of that carotid artery. I have great concern that if the traditional algorithm of waiting 2-4 weeks to perform a carotid endarterectomy is followed he will have a catastrophic stroke in the meantime. Secondary to the low flow phenomenon I have initiated Eliquis. I'm clearly aware that more acute intervention would also carry a high risk but the situation in this type of instance is not one that allows one to rely on clinical trial data to a great degree. This is particularly the case in that the carotid endarterectomy trials were all basically done before the Restoration of diffusion weighted imaging MRI.   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

## 2018-03-30 ENCOUNTER — PATIENT OUTREACH (OUTPATIENT)
Dept: CASE MANAGEMENT | Age: 83
End: 2018-03-30

## 2018-03-30 NOTE — PROGRESS NOTES
This note will not be viewable in [address] AYANNA outreach postponed due to patient transferred to [facility] for continued treatment at discharge from [facility] on 3/29/18. Will plan follow up in 1 week.

## 2018-03-30 NOTE — PROCEDURES
Foster Arias  MR#: [id number]  : 1935  ACCOUNT #: [de-identified]   DATE OF SERVICE: 2018    ORDERING PHYSICIAN:  Iveth Means MD     INDICATIONS:   Concern for seizures; syncope; altered mental status versus TIA or seizures of unclear etiology. MEDICATIONS:  Keppra, Zofran, Niaspan, Protonix, Pravachol, Apresoline, Lexapro, Plavix, Maxipime, Coreg, Tylenol and aspirin. PAST MEDICAL HISTORY:  Positive for stroke, TIA, CKD, CAD, hypertension, inoperable carotid stenosis on the left. DESCRIPTION:  This is a 21 channel EEG using 1 channel of EKG, 20 channels of EEG with 10-20 International electrode placement and bipolar referential recordings for more than 30 minutes. This is an awake, drowsy and sleeping tracing. An 80-year-old, left-handed male, very pleasant and cooperative. Photic stimulation is performed. Hyperventilation is deferred. Background activity is a 7 Hz, 50 microvolt theta rhythm throughout the record. There is a slight amount of 8 Hz, 50 microvolt intermixed posterior alpha. Awake, drowsy and sleeping tracings are obtained. No electrographic seizure. No specific electrographic evidence for a seizure focus. The EKG is a good rate at 64. INTERPRETATION:  This is an abnormal electroencephalogram because of generalized slowing into the theta range, compatible with a metabolic toxic encephalopathy of a mild degree that could be from medication, neurodegenerative disorders or metabolic disorders. Further clinical correlation is recommended. No seizure occurs. No focal abnormal slowing or focus is noted.       Ilah Schlatter, MD BMT / ABHI  D: 2018 17:30     T: 2018 11:58  JOB #: [id number]

## 2018-04-05 ENCOUNTER — PATIENT OUTREACH (OUTPATIENT)
Dept: CASE MANAGEMENT | Age: 83
End: 2018-04-05

## 2018-04-05 NOTE — PROGRESS NOTES
This note will not be viewable in [address] Per review of notes from Care Everywhere patient remains in patient at [facility] following transfer from [facility] on 3/29/18. No further outreach indicated and will close case.

## 2018-06-14 PROBLEM — N30.00 ACUTE CYSTITIS WITHOUT HEMATURIA: Status: RESOLVED | Noted: 2018-03-26 | Resolved: 2018-06-14

## 2018-06-14 PROBLEM — R55 SYNCOPE AND COLLAPSE: Status: RESOLVED | Noted: 2018-03-29 | Resolved: 2018-06-14

## 2018-07-11 NOTE — PROCEDURES
Viviane Hendrix  MR#: [id number]  : 1935  ACCOUNT #: [de-identified]   DATE OF SERVICE: 2018    ORDERING PHYSICIAN:  Kathrin Cody MD     INDICATIONS:   Concern for seizures; syncope; altered mental status versus TIA or seizures of unclear etiology. MEDICATIONS:  Keppra, Zofran, Niaspan, Protonix, Pravachol, Apresoline, Lexapro, Plavix, Maxipime, Coreg, Tylenol and aspirin. PAST MEDICAL HISTORY:  Positive for stroke, TIA, CKD, CAD, hypertension, inoperable carotid stenosis on the left. DESCRIPTION:  This is a 21 channel EEG using 1 channel of EKG, 20 channels of EEG with 10-20 International electrode placement and bipolar referential recordings for more than 30 minutes. This is an awake, drowsy and sleeping tracing. An 80-year-old, left-handed male, very pleasant and cooperative. Photic stimulation is performed. Hyperventilation is deferred. Background activity is a 7 Hz, 50 microvolt theta rhythm throughout the record. There is a slight amount of 8 Hz, 50 microvolt intermixed posterior alpha. Awake, drowsy and sleeping tracings are obtained. No electrographic seizure. No specific electrographic evidence for a seizure focus. The EKG is a good rate at 64. INTERPRETATION:  This is an abnormal electroencephalogram because of generalized slowing into the theta range, compatible with a metabolic toxic encephalopathy of a mild degree that could be from medication, neurodegenerative disorders or metabolic disorders. Further clinical correlation is recommended. No seizure occurs. No focal abnormal slowing or focus is noted.       MD MARVA Chamorro / ABHI  D: 2018 17:30     T: 2018 11:58  JOB #: [id number]

## 2018-09-12 PROBLEM — I10 HTN (HYPERTENSION), MALIGNANT: Status: RESOLVED | Noted: 2018-03-26 | Resolved: 2018-09-12

## 2018-09-12 PROBLEM — K59.01 SLOW TRANSIT CONSTIPATION: Status: ACTIVE | Noted: 2018-09-12

## 2018-09-12 PROBLEM — R56.9 SEIZURES (HCC): Status: RESOLVED | Noted: 2018-03-29 | Resolved: 2018-09-12

## 2019-01-18 ENCOUNTER — APPOINTMENT (OUTPATIENT)
Dept: GENERAL RADIOLOGY | Age: 84
End: 2019-01-18
Attending: STUDENT IN AN ORGANIZED HEALTH CARE EDUCATION/TRAINING PROGRAM
Payer: MEDICARE

## 2019-01-18 ENCOUNTER — HOSPITAL ENCOUNTER (EMERGENCY)
Age: 84
Discharge: HOME | End: 2019-01-18
Attending: STUDENT IN AN ORGANIZED HEALTH CARE EDUCATION/TRAINING PROGRAM
Payer: MEDICARE

## 2019-01-18 VITALS
DIASTOLIC BLOOD PRESSURE: 82 MMHG | WEIGHT: 200 LBS | HEIGHT: 72 IN | TEMPERATURE: 98.1 F | BODY MASS INDEX: 27.09 KG/M2 | SYSTOLIC BLOOD PRESSURE: 186 MMHG | OXYGEN SATURATION: 98 % | HEART RATE: 65 BPM | RESPIRATION RATE: 18 BRPM

## 2019-01-18 DIAGNOSIS — M10.9 ACUTE GOUT OF LEFT HAND, UNSPECIFIED CAUSE: Primary | ICD-10-CM

## 2019-01-18 LAB
ALBUMIN SERPL-MCNC: 2.9 G/DL (ref 3.2–4.6)
ALBUMIN/GLOB SERPL: 0.7 {RATIO} (ref 1.2–3.5)
ALP SERPL-CCNC: 97 U/L (ref 50–136)
ALT SERPL-CCNC: 28 U/L (ref 12–65)
ANION GAP SERPL CALC-SCNC: 5 MMOL/L (ref 7–16)
AST SERPL-CCNC: 19 U/L (ref 15–37)
BASOPHILS # BLD: 0.1 K/UL (ref 0–0.2)
BASOPHILS NFR BLD: 1 % (ref 0–2)
BILIRUB SERPL-MCNC: 0.3 MG/DL (ref 0.2–1.1)
BUN SERPL-MCNC: 34 MG/DL (ref 8–23)
CALCIUM SERPL-MCNC: 8.6 MG/DL (ref 8.3–10.4)
CHLORIDE SERPL-SCNC: 103 MMOL/L (ref 98–107)
CO2 SERPL-SCNC: 32 MMOL/L (ref 21–32)
CREAT SERPL-MCNC: 1.61 MG/DL (ref 0.8–1.5)
DIFFERENTIAL METHOD BLD: ABNORMAL
EOSINOPHIL # BLD: 0.6 K/UL (ref 0–0.8)
EOSINOPHIL NFR BLD: 6 % (ref 0.5–7.8)
ERYTHROCYTE [DISTWIDTH] IN BLOOD BY AUTOMATED COUNT: 13.7 % (ref 11.9–14.6)
GLOBULIN SER CALC-MCNC: 4.4 G/DL (ref 2.3–3.5)
GLUCOSE SERPL-MCNC: 104 MG/DL (ref 65–100)
HCT VFR BLD AUTO: 38.4 % (ref 41.1–50.3)
HGB BLD-MCNC: 12.3 G/DL (ref 13.6–17.2)
IMM GRANULOCYTES # BLD AUTO: 0.1 K/UL (ref 0–0.5)
IMM GRANULOCYTES NFR BLD AUTO: 1 % (ref 0–5)
LYMPHOCYTES # BLD: 2.5 K/UL (ref 0.5–4.6)
LYMPHOCYTES NFR BLD: 22 % (ref 13–44)
MCH RBC QN AUTO: 26.7 PG (ref 26.1–32.9)
MCHC RBC AUTO-ENTMCNC: 32 G/DL (ref 31.4–35)
MCV RBC AUTO: 83.5 FL (ref 79.6–97.8)
MONOCYTES # BLD: 0.9 K/UL (ref 0.1–1.3)
MONOCYTES NFR BLD: 8 % (ref 4–12)
NEUTS SEG # BLD: 7.3 K/UL (ref 1.7–8.2)
NEUTS SEG NFR BLD: 64 % (ref 43–78)
NRBC # BLD: 0 K/UL (ref 0–0.2)
PLATELET # BLD AUTO: 297 K/UL (ref 150–450)
PMV BLD AUTO: 11.5 FL (ref 9.4–12.3)
POTASSIUM SERPL-SCNC: 3.8 MMOL/L (ref 3.5–5.1)
PROT SERPL-MCNC: 7.3 G/DL (ref 6.3–8.2)
RBC # BLD AUTO: 4.6 M/UL (ref 4.23–5.6)
SODIUM SERPL-SCNC: 140 MMOL/L (ref 136–145)
URATE SERPL-MCNC: 7.4 MG/DL (ref 2.6–6)
WBC # BLD AUTO: 11.4 K/UL (ref 4.3–11.1)

## 2019-01-18 PROCEDURE — 73130 X-RAY EXAM OF HAND: CPT

## 2019-01-18 PROCEDURE — 84550 ASSAY OF BLOOD/URIC ACID: CPT

## 2019-01-18 PROCEDURE — 99284 EMERGENCY DEPT VISIT MOD MDM: CPT | Performed by: STUDENT IN AN ORGANIZED HEALTH CARE EDUCATION/TRAINING PROGRAM

## 2019-01-18 PROCEDURE — 80053 COMPREHEN METABOLIC PANEL: CPT

## 2019-01-18 PROCEDURE — 85025 COMPLETE CBC W/AUTO DIFF WBC: CPT

## 2019-01-18 RX ORDER — CEPHALEXIN 500 MG/1
500 CAPSULE ORAL 4 TIMES DAILY
Qty: 28 CAP | Refills: 0 | Status: SHIPPED | OUTPATIENT
Start: 2019-01-18 | End: 2019-01-25

## 2019-01-18 NOTE — ED NOTES
I have reviewed discharge instructions with the patient. The patient and spouse verbalized understanding. Patient left ED via Discharge Method: stretcher to Home with wife and jose Opportunity for questions and clarification provided. Patient given 1 scripts. To continue your aftercare when you leave the hospital, you may receive an automated call from our care team to check in on how you are doing. This is a free service and part of our promise to provide the best care and service to meet your aftercare needs.  If you have questions, or wish to unsubscribe from this service please call [phone]. Thank you for Choosing our [facility] Emergency Department.

## 2019-01-18 NOTE — ED PROVIDER NOTES
51-year-old male patient presents with reports of left hand pain and swelling. Patient states the symptoms started several days ago. Reports a history of gout with similar symptoms in the past.  Patient is started colchicine at home and since starting this medication as experienced reduction of swelling. He states the pain and swelling is localized to the left thumb at this time. There is some faint redness to the area per report. He denies associated fever, chills, injury to the area. Apparently, patient spoke with his primary care provider today who advised him to come to the emergency department for further evaluation. Patient has a laundry list of medical issues and past medical history including multiple strokes in the past which has limited his mobility to quite an extent. He is seen by physical therapy regularly who noted swelling today and advised patient to contact his doctor. The history is provided by the patient and a relative. No  was used. Hand Swelling This is a new problem. The current episode started 1 to 2 hours ago. The problem occurs constantly. The problem has not changed since onset. The pain is present in the left hand (right forehead). The quality of the pain is described as aching. The pain is mild. Pertinent negatives include no numbness, no back pain and no neck pain. There has been a history of trauma. Past Medical History:  
Diagnosis Date  Arthritis  Asthma   
 as a kid. none recent  Atrial flutter with rapid ventricular response ([address] 6/18/2014 1. Diagnosed 6/18/14: s/p ablation. 2. Echo (6/19/14): EF 45-50%. Moderate mitral regurgitation. 3. Ecardio (8/12-8/25/14): No atrial arrhythmias.  Basal cell carcinoma of skin of other and unspecified parts of face 2/20/2014  Benign hypertensive heart disease without heart failure 9/29/2015  BPH (benign prostatic hyperplasia) 4/16/2013  CAD (coronary artery disease) CABG, cardiac cath , stents times 2  
 Calculus of kidney 2/20/2014  Cancer ([address]  Carotid artery stenosis with cerebral infarction ([address] 9/29/2015 Followed by Dr. Supriya Alaniz.  Chest pain, unspecified 12/11/2013  CVA (cerebral vascular accident) ([address] 4/16/2013  Diarrhea following gastrointestinal surgery 4/16/2013  Diverticulitis colectomy  Dizziness 6/18/2014  Generalized weakness  GERD (gastroesophageal reflux disease)  Gout 4/16/2013  Heart disease, unspecified 2/20/2014  History of basal cell cancer nose  History of CVA (cerebrovascular accident) 6/18/2014 History of mini-strokes.  History of kidney stones 2006  Hyperlipidemia 4/16/2013  Hypertension  Hypertrophy of prostate with urinary obstruction and other lower urinary tract symptoms (LUTS) 2/20/2014  Hypothyroidism 4/16/2013  Incomplete bladder emptying 2/20/2014  Iron deficiency anemia 4/16/2013  
 NSTEMI (non-ST elevated myocardial infarction) ([address] 12/12/2013  Osteoarthritis 4/16/2013  Platelet inhibition due to Plavix  Pure hypercholesterolemia 2/20/2014  Stroke ([address] x3 with the last one being 2/12  Unspecified adverse effect of anesthesia   
 state he woke up during heart surgery  Unspecified asthma 2/20/2014  Unspecified disorder of thyroid 2/20/2014  Urinary frequency 2/20/2014  Urinary tract infection, site not specified 11/20/2013 Past Surgical History:  
Procedure Laterality Date  CABG, ARTERY-VEIN, FOUR  2005  HX ADENOIDECTOMY  HX APPENDECTOMY  age 15  
 HX CAROTID ENDARTERECTOMY Right 2011  HX CATARACT REMOVAL Bilateral 2006  HX CHOLECYSTECTOMY  HX CORONARY STENT PLACEMENT    
 HX OTHER SURGICAL  3/12/14 Plasma Button procedure of prostate  HX SMALL BOWEL RESECTION    
 HX TONSILLECTOMY  NE LEFT HEART CATH,PERCUTANEOUS  12/12/2013  
 bmsx2 svg to !st obtuse marginal  
  REMOVAL OF KIDNEY STONE  2016 [facility] ARTHROPLASTY  2007  
 right  TOTAL KNEE ARTHROPLASTY  2012  
 left Family History:  
Problem Relation Age of Onset  Heart Disease Father  Heart Attack Father  Stroke Sister  Cancer Sister   
     brain  Heart Disease Mother  Bleeding Prob Mother 80  
     breast cancer Social History Socioeconomic History  Marital status:  Spouse name: Not on file  Number of children: Not on file  Years of education: Not on file  Highest education level: Not on file Social Needs  Financial resource strain: Not on file  Food insecurity - worry: Not on file  Food insecurity - inability: Not on file  Transportation needs - medical: Not on file  Transportation needs - non-medical: Not on file Occupational History  Not on file Tobacco Use  Smoking status: Former Smoker Types: Cigars Last attempt to quit: 2002 Years since quittin.0  Smokeless tobacco: Never Used  Tobacco comment: quit - cigars Substance and Sexual Activity  Alcohol use: No  
  Alcohol/week: 0.0 oz  
  Comment: rare  Drug use: No  
 Sexual activity: Not Currently Other Topics Concern  Not on file Social History Narrative  Not on file ALLERGIES: Other medication Review of Systems Constitutional: Negative for chills, diaphoresis and fever. HENT: Negative for congestion, sneezing and sore throat. Eyes: Negative for visual disturbance. Respiratory: Negative for cough, chest tightness, shortness of breath and wheezing. Cardiovascular: Negative for chest pain and leg swelling. Gastrointestinal: Negative for abdominal pain, blood in stool, diarrhea, nausea and vomiting. Endocrine: Negative for polyuria. Genitourinary: Negative for difficulty urinating, dysuria, flank pain, hematuria and urgency. Musculoskeletal: Negative for back pain, myalgias, neck pain and neck stiffness. Skin: Negative for color change and rash. Neurological: Negative for dizziness, syncope, speech difficulty, weakness, light-headedness, numbness and headaches. Psychiatric/Behavioral: Negative for behavioral problems. All other systems reviewed and are negative. Vitals:  
 01/18/19 1701 01/18/19 1728 01/18/19 1732 01/18/19 1748 BP: 181/79  186/82 Pulse:      
Resp:      
Temp:      
SpO2: 97% 99% 97% 98% Weight:      
Height:      
      
 
Physical Exam  
Constitutional: He is oriented to person, place, and time. He appears well-developed and well-nourished. No distress. Alert and oriented to person place and time. No acute distress, speaks in clear, fluid sentences. HENT:  
Head: Normocephalic and atraumatic. Right Ear: External ear normal.  
Left Ear: External ear normal.  
Nose: Nose normal.  
Eyes: EOM are normal. Pupils are equal, round, and reactive to light. Neck: Normal range of motion. Cardiovascular: Normal rate, regular rhythm, normal heart sounds and intact distal pulses. Exam reveals no gallop and no friction rub. No murmur heard. Pulmonary/Chest: Effort normal and breath sounds normal. No respiratory distress. He has no wheezes. He has no rales. He exhibits no tenderness. Abdominal: Normal appearance. Bilateral nephrostomy tubes in place. Musculoskeletal: Normal range of motion. He exhibits no edema, tenderness or deformity. Neurological: He is alert and oriented to person, place, and time. No cranial nerve deficit. Skin: Skin is warm and dry. He is not diaphoretic. There is faint redness involving the patient's left thumb. Minimal erythema on palpation. There is pain to palpation of the area and his range of motion is slightly limited secondary to swelling.   Moderate amount of swelling extends from the base of the thumb to the distal portion of the extremity, prescribed a refill present. Nursing note and vitals reviewed. MDM Number of Diagnoses or Management Options Acute gout of left hand, unspecified cause: new and requires workup Diagnosis management comments: Patient's symptoms are consistent with gout however cellulitis is also possible. Patient denies history of gout attacks involving his hands. It is improved with colchicine at home. I will cover for cellulitis as well with antibiotics. X-ray imaging of the area is unremarkable. Amount and/or Complexity of Data Reviewed Tests in the radiology section of CPT®: ordered and reviewed Independent visualization of images, tracings, or specimens: yes Risk of Complications, Morbidity, and/or Mortality Presenting problems: moderate Diagnostic procedures: low Management options: moderate Patient Progress Patient progress: stable Procedures

## 2019-01-18 NOTE — ED NOTES
Patient to ED via [facility] with c/c L hand swelling/pain. Patient bed confined due to CVA. Patient contacted physician, advised to come to ED. Vital signs stable for EMS for en route. Patient reports 1-2 day history of L hand pain, swelling. Worst of the swelling confined to the thumb. +PMS in the L hand. Patient denies any acute traumatic process over the past few days. Brisk cap refill. Patient with no tachycardia, afebrile at time of triage. Patient with HTN at time of triage.

## 2019-01-18 NOTE — DISCHARGE INSTRUCTIONS
Patient Education        Gout: Care Instructions  Your Care Instructions    Gout is a form of arthritis caused by a buildup of uric acid crystals in a joint. It causes sudden attacks of pain, swelling, redness, and stiffness, usually in one joint, especially the big toe. Gout usually comes on without a cause. But it can be brought on by drinking alcohol (especially beer) or eating seafood and red meat. Taking certain medicines, such as diuretics or aspirin, also can bring on an attack of gout. Taking your medicines as prescribed and following up with your doctor regularly can help you avoid gout attacks in the future. Follow-up care is a key part of your treatment and safety. Be sure to make and go to all appointments, and call your doctor if you are having problems. It's also a good idea to know your test results and keep a list of the medicines you take. How can you care for yourself at home? · If the joint is swollen, put ice or a cold pack on the area for 10 to 20 minutes at a time. Put a thin cloth between the ice and your skin. · Prop up the sore limb on a pillow when you ice it or anytime you sit or lie down during the next 3 days. Try to keep it above the level of your heart. This will help reduce swelling. · Rest sore joints. Avoid activities that put weight or strain on the joints for a few days. Take short rest breaks from your regular activities during the day. · Take your medicines exactly as prescribed. Call your doctor if you think you are having a problem with your medicine. · Take pain medicines exactly as directed. ? If the doctor gave you a prescription medicine for pain, take it as prescribed. ? If you are not taking a prescription pain medicine, ask your doctor if you can take an over-the-counter medicine. · Eat less seafood and red meat. · Check with your doctor before drinking alcohol. · Losing weight, if you are overweight, may help reduce attacks of gout.  But do not go on a \"crash diet. \" Losing a lot of weight in a short amount of time can cause a gout attack. When should you call for help? Call your doctor now or seek immediate medical care if:    · You have a fever.     · The joint is so painful you cannot use it.     · You have sudden, unexplained swelling, redness, warmth, or severe pain in one or more joints.    Watch closely for changes in your health, and be sure to contact your doctor if:    · You have joint pain.     · Your symptoms get worse or are not improving after 2 or 3 days. Where can you learn more? Go to http://vazquez-gloyr.info/. Enter L980 in the search box to learn more about \"Gout: Care Instructions. \"  Current as of: Charmaine 10, 2018  Content Version: 11.9  © 8974-6663 [employer]. Care instructions adapted under license by [employer] (which disclaims liability or warranty for this information). If you have questions about a medical condition or this instruction, always ask your healthcare professional. [place] [address] any warranty or liability for your use of this information. Patient Education        Purine-Restricted Diet: Care Instructions  Your Care Instructions    Purines are substances that are found in some foods. Your body turns purines into uric acid. High levels of uric acid can cause gout, which is a form of arthritis that causes pain and inflammation in joints. You may be able to help control the amount of uric acid in your body by limiting high-purine foods in your diet. Follow-up care is a key part of your treatment and safety. Be sure to make and go to all appointments, and call your doctor if you are having problems. It's also a good idea to know your test results and keep a list of the medicines you take. How can you care for yourself at home? · Plan your meals and snacks around foods that are low in purines and are safe for you to eat.  These foods include:  ? Larry Simental vegetables and tomatoes. ? Fruits. ? Whole-grain breads, rice, and cereals. ? Eggs, peanut butter, and nuts. ? Low-fat milk, cheese, and other milk products. ? Popcorn. ? Gelatin desserts, chocolate, cocoa, and cakes and sweets, in small amounts. · You can eat certain foods that are medium-high in purines, but eat them only once in a while. These foods include:  ? Legumes, such as dried beans and dried peas. You can have 1 cup cooked legumes each day. ? Asparagus, cauliflower, spinach, mushrooms, and green peas. ? Fish and seafood (other than very high-purine seafood). ? Oatmeal, wheat bran, and wheat germ. · Limit very high-purine foods, including:  ? Organ meats, such as liver, kidneys, sweetbreads, and brains. ? Meats, including eagle, beef, pork, and lamb. ? Game meats and any other meats in large amounts. ? Anchovies, sardines, herring, mackerel, and scallops. ? Gravy. ? Beer. Where can you learn more? Go to http://vazquez-glory.info/. Enter F448 in the search box to learn more about \"Purine-Restricted Diet: Care Instructions. \"  Current as of: March 28, 2018  Content Version: 11.9  © 4227-7701 [employer]. Care instructions adapted under license by [employer] (which disclaims liability or warranty for this information). If you have questions about a medical condition or this instruction, always ask your healthcare professional. [place] [address] any warranty or liability for your use of this information.

## 2019-02-04 ENCOUNTER — APPOINTMENT (OUTPATIENT)
Dept: GENERAL RADIOLOGY | Age: 84
DRG: 243 | End: 2019-02-04
Attending: EMERGENCY MEDICINE
Payer: MEDICARE

## 2019-02-04 ENCOUNTER — HOSPITAL ENCOUNTER (INPATIENT)
Age: 84
LOS: 5 days | Discharge: NURSING, REHAB OR LONG-TERM CARE | DRG: 243 | End: 2019-02-11
Attending: EMERGENCY MEDICINE | Admitting: INTERNAL MEDICINE
Payer: MEDICARE

## 2019-02-04 DIAGNOSIS — Z95.0 PACEMAKER: ICD-10-CM

## 2019-02-04 DIAGNOSIS — I44.30 HEART BLOCK ATRIOVENTRICULAR: Primary | ICD-10-CM

## 2019-02-04 PROBLEM — R00.1 BRADYCARDIA: Status: ACTIVE | Noted: 2019-02-04

## 2019-02-04 LAB
ALBUMIN SERPL-MCNC: 2.9 G/DL (ref 3.2–4.6)
ALBUMIN/GLOB SERPL: 0.7 {RATIO} (ref 1.2–3.5)
ALP SERPL-CCNC: 97 U/L (ref 50–136)
ALT SERPL-CCNC: 36 U/L (ref 12–65)
ANION GAP SERPL CALC-SCNC: 8 MMOL/L (ref 7–16)
AST SERPL-CCNC: 21 U/L (ref 15–37)
BASOPHILS # BLD: 0.1 K/UL (ref 0–0.2)
BASOPHILS NFR BLD: 1 % (ref 0–2)
BILIRUB SERPL-MCNC: 0.3 MG/DL (ref 0.2–1.1)
BNP SERPL-MCNC: 97 PG/ML
BUN SERPL-MCNC: 46 MG/DL (ref 8–23)
CALCIUM SERPL-MCNC: 8 MG/DL (ref 8.3–10.4)
CHLORIDE SERPL-SCNC: 106 MMOL/L (ref 98–107)
CO2 SERPL-SCNC: 24 MMOL/L (ref 21–32)
CREAT SERPL-MCNC: 2.04 MG/DL (ref 0.8–1.5)
DIFFERENTIAL METHOD BLD: ABNORMAL
EOSINOPHIL # BLD: 0.9 K/UL (ref 0–0.8)
EOSINOPHIL NFR BLD: 8 % (ref 0.5–7.8)
ERYTHROCYTE [DISTWIDTH] IN BLOOD BY AUTOMATED COUNT: 14.3 % (ref 11.9–14.6)
GLOBULIN SER CALC-MCNC: 3.9 G/DL (ref 2.3–3.5)
GLUCOSE SERPL-MCNC: 111 MG/DL (ref 65–100)
HCT VFR BLD AUTO: 40.5 % (ref 41.1–50.3)
HGB BLD-MCNC: 13.2 G/DL (ref 13.6–17.2)
IMM GRANULOCYTES # BLD AUTO: 0.1 K/UL (ref 0–0.5)
IMM GRANULOCYTES NFR BLD AUTO: 1 % (ref 0–5)
LYMPHOCYTES # BLD: 3.3 K/UL (ref 0.5–4.6)
LYMPHOCYTES NFR BLD: 30 % (ref 13–44)
MAGNESIUM SERPL-MCNC: 1.9 MG/DL (ref 1.8–2.4)
MCH RBC QN AUTO: 27.3 PG (ref 26.1–32.9)
MCHC RBC AUTO-ENTMCNC: 32.6 G/DL (ref 31.4–35)
MCV RBC AUTO: 83.7 FL (ref 79.6–97.8)
MONOCYTES # BLD: 0.9 K/UL (ref 0.1–1.3)
MONOCYTES NFR BLD: 8 % (ref 4–12)
NEUTS SEG # BLD: 5.7 K/UL (ref 1.7–8.2)
NEUTS SEG NFR BLD: 52 % (ref 43–78)
NRBC # BLD: 0 K/UL (ref 0–0.2)
PLATELET # BLD AUTO: 354 K/UL (ref 150–450)
PMV BLD AUTO: 12.5 FL (ref 9.4–12.3)
POTASSIUM SERPL-SCNC: 3.7 MMOL/L (ref 3.5–5.1)
PROT SERPL-MCNC: 6.8 G/DL (ref 6.3–8.2)
RBC # BLD AUTO: 4.84 M/UL (ref 4.23–5.6)
SODIUM SERPL-SCNC: 138 MMOL/L (ref 136–145)
TROPONIN I BLD-MCNC: 0.01 NG/ML (ref 0.02–0.05)
TROPONIN I SERPL-MCNC: 0.02 NG/ML (ref 0.02–0.05)
TSH SERPL DL<=0.005 MIU/L-ACNC: 3.38 UIU/ML (ref 0.36–3.74)
WBC # BLD AUTO: 10.9 K/UL (ref 4.3–11.1)

## 2019-02-04 PROCEDURE — 84484 ASSAY OF TROPONIN QUANT: CPT

## 2019-02-04 PROCEDURE — 93005 ELECTROCARDIOGRAM TRACING: CPT | Performed by: INTERNAL MEDICINE

## 2019-02-04 PROCEDURE — 71045 X-RAY EXAM CHEST 1 VIEW: CPT

## 2019-02-04 PROCEDURE — 80053 COMPREHEN METABOLIC PANEL: CPT

## 2019-02-04 PROCEDURE — 99285 EMERGENCY DEPT VISIT HI MDM: CPT | Performed by: EMERGENCY MEDICINE

## 2019-02-04 PROCEDURE — 93005 ELECTROCARDIOGRAM TRACING: CPT | Performed by: EMERGENCY MEDICINE

## 2019-02-04 PROCEDURE — 84443 ASSAY THYROID STIM HORMONE: CPT

## 2019-02-04 PROCEDURE — 83735 ASSAY OF MAGNESIUM: CPT

## 2019-02-04 PROCEDURE — 85025 COMPLETE CBC W/AUTO DIFF WBC: CPT

## 2019-02-04 PROCEDURE — 83880 ASSAY OF NATRIURETIC PEPTIDE: CPT

## 2019-02-04 PROCEDURE — 74011250636 HC RX REV CODE- 250/636: Performed by: NURSE PRACTITIONER

## 2019-02-04 PROCEDURE — 82962 GLUCOSE BLOOD TEST: CPT

## 2019-02-04 PROCEDURE — 96360 HYDRATION IV INFUSION INIT: CPT | Performed by: EMERGENCY MEDICINE

## 2019-02-04 RX ORDER — SODIUM CHLORIDE 9 MG/ML
75 INJECTION, SOLUTION INTRAVENOUS CONTINUOUS
Status: DISCONTINUED | OUTPATIENT
Start: 2019-02-04 | End: 2019-02-11

## 2019-02-04 RX ADMIN — SODIUM CHLORIDE 75 ML/HR: 900 INJECTION, SOLUTION INTRAVENOUS at 23:46

## 2019-02-05 LAB
ANION GAP SERPL CALC-SCNC: 9 MMOL/L (ref 7–16)
ATRIAL RATE: 38 BPM
ATRIAL RATE: 79 BPM
BUN SERPL-MCNC: 49 MG/DL (ref 8–23)
CALCIUM SERPL-MCNC: 7.8 MG/DL (ref 8.3–10.4)
CALCULATED R AXIS, ECG10: -69 DEGREES
CALCULATED R AXIS, ECG10: -71 DEGREES
CALCULATED T AXIS, ECG11: 107 DEGREES
CALCULATED T AXIS, ECG11: 81 DEGREES
CHLORIDE SERPL-SCNC: 110 MMOL/L (ref 98–107)
CO2 SERPL-SCNC: 22 MMOL/L (ref 21–32)
CREAT SERPL-MCNC: 2.01 MG/DL (ref 0.8–1.5)
DIAGNOSIS, 93000: NORMAL
DIAGNOSIS, 93000: NORMAL
GLUCOSE BLD STRIP.AUTO-MCNC: 95 MG/DL (ref 65–100)
GLUCOSE SERPL-MCNC: 102 MG/DL (ref 65–100)
POTASSIUM SERPL-SCNC: 3.5 MMOL/L (ref 3.5–5.1)
Q-T INTERVAL, ECG07: 566 MS
Q-T INTERVAL, ECG07: 658 MS
QRS DURATION, ECG06: 180 MS
QRS DURATION, ECG06: 182 MS
QTC CALCULATION (BEZET), ECG08: 437 MS
QTC CALCULATION (BEZET), ECG08: 494 MS
SODIUM SERPL-SCNC: 141 MMOL/L (ref 136–145)
VENTRICULAR RATE, ECG03: 34 BPM
VENTRICULAR RATE, ECG03: 36 BPM

## 2019-02-05 PROCEDURE — 74011250637 HC RX REV CODE- 250/637: Performed by: NURSE PRACTITIONER

## 2019-02-05 PROCEDURE — 74011250637 HC RX REV CODE- 250/637: Performed by: INTERNAL MEDICINE

## 2019-02-05 PROCEDURE — 77030020263 HC SOL INJ SOD CL0.9% LFCR 1000ML

## 2019-02-05 PROCEDURE — 77030019605

## 2019-02-05 PROCEDURE — 99218 HC RM OBSERVATION: CPT

## 2019-02-05 PROCEDURE — 80048 BASIC METABOLIC PNL TOTAL CA: CPT

## 2019-02-05 PROCEDURE — 74011250636 HC RX REV CODE- 250/636: Performed by: NURSE PRACTITIONER

## 2019-02-05 PROCEDURE — 96361 HYDRATE IV INFUSION ADD-ON: CPT | Performed by: EMERGENCY MEDICINE

## 2019-02-05 RX ORDER — BUPIVACAINE HYDROCHLORIDE AND EPINEPHRINE 5; 5 MG/ML; UG/ML
60 INJECTION, SOLUTION EPIDURAL; INTRACAUDAL; PERINEURAL ONCE
Status: DISCONTINUED | OUTPATIENT
Start: 2019-02-05 | End: 2019-02-05

## 2019-02-05 RX ORDER — NITROGLYCERIN 0.4 MG/1
0.4 TABLET SUBLINGUAL
Status: DISCONTINUED | OUTPATIENT
Start: 2019-02-05 | End: 2019-02-11 | Stop reason: HOSPADM

## 2019-02-05 RX ORDER — ESCITALOPRAM OXALATE 10 MG/1
10 TABLET ORAL DAILY
Status: DISCONTINUED | OUTPATIENT
Start: 2019-02-05 | End: 2019-02-11 | Stop reason: HOSPADM

## 2019-02-05 RX ORDER — ATORVASTATIN CALCIUM 40 MG/1
80 TABLET, FILM COATED ORAL DAILY
Status: DISCONTINUED | OUTPATIENT
Start: 2019-02-05 | End: 2019-02-11 | Stop reason: HOSPADM

## 2019-02-05 RX ORDER — ASPIRIN 81 MG/1
81 TABLET ORAL DAILY
Status: DISCONTINUED | OUTPATIENT
Start: 2019-02-05 | End: 2019-02-11 | Stop reason: HOSPADM

## 2019-02-05 RX ORDER — PANTOPRAZOLE SODIUM 40 MG/1
40 TABLET, DELAYED RELEASE ORAL
Status: DISCONTINUED | OUTPATIENT
Start: 2019-02-05 | End: 2019-02-11 | Stop reason: HOSPADM

## 2019-02-05 RX ORDER — ACETAMINOPHEN 325 MG/1
650 TABLET ORAL
Status: DISCONTINUED | OUTPATIENT
Start: 2019-02-05 | End: 2019-02-11 | Stop reason: HOSPADM

## 2019-02-05 RX ORDER — SODIUM CHLORIDE 0.9 % (FLUSH) 0.9 %
5-40 SYRINGE (ML) INJECTION AS NEEDED
Status: DISCONTINUED | OUTPATIENT
Start: 2019-02-05 | End: 2019-02-09 | Stop reason: SDUPTHER

## 2019-02-05 RX ORDER — SODIUM CHLORIDE 0.9 % (FLUSH) 0.9 %
5-40 SYRINGE (ML) INJECTION EVERY 8 HOURS
Status: DISCONTINUED | OUTPATIENT
Start: 2019-02-05 | End: 2019-02-09 | Stop reason: SDUPTHER

## 2019-02-05 RX ORDER — ASPIRIN 81 MG/1
81 TABLET ORAL
Status: DISPENSED | OUTPATIENT
Start: 2019-02-05 | End: 2019-02-05

## 2019-02-05 RX ORDER — MORPHINE SULFATE 2 MG/ML
2 INJECTION, SOLUTION INTRAMUSCULAR; INTRAVENOUS
Status: DISCONTINUED | OUTPATIENT
Start: 2019-02-05 | End: 2019-02-11 | Stop reason: HOSPADM

## 2019-02-05 RX ADMIN — ASPIRIN 81 MG: 81 TABLET, COATED ORAL at 15:54

## 2019-02-05 RX ADMIN — APIXABAN 2.5 MG: 2.5 TABLET, FILM COATED ORAL at 15:52

## 2019-02-05 RX ADMIN — Medication 10 ML: at 11:30

## 2019-02-05 RX ADMIN — ESCITALOPRAM OXALATE 10 MG: 10 TABLET ORAL at 15:53

## 2019-02-05 RX ADMIN — LEVOTHYROXINE SODIUM 137 MCG: 50 TABLET ORAL at 15:52

## 2019-02-05 RX ADMIN — ATORVASTATIN CALCIUM 80 MG: 40 TABLET, FILM COATED ORAL at 15:52

## 2019-02-05 RX ADMIN — SODIUM CHLORIDE 75 ML/HR: 900 INJECTION, SOLUTION INTRAVENOUS at 11:30

## 2019-02-05 RX ADMIN — PANTOPRAZOLE SODIUM 40 MG: 40 TABLET, DELAYED RELEASE ORAL at 15:53

## 2019-02-05 NOTE — H&P
[facility] History & Physical  
  
Date of  Admission: 2/4/2019  9:30 PM  
 
Primary Care Physician: Dr. Marisa Bland Primary Cardiologist: Dr. Sumit Gracia Admitting Physician: Dr. Pilar Andrade CC: bradycardia HPI:  Christian Ibarra is a 80 y.o. male with past medical history of CAD with remote CABG, Carotid artery disease, remote history of mini strokes, PAF (on Eliquis), HTN, and dyslipidemia who presented to the ER via EMS with complaints of HR in the 35s. Patient has been bed-bound for approximately 9 months. His wife reports excessive diarrhea for the past several days after taking medications for gout (Colchicine). She reports that she has had to change his brief every 2 hours for the past several days. He is asymptomatic with bradycardia. Denies chest pain, shortness of breath or dizziness. Per patient's wife, she reports that he was difficult to wake up yesterday morning. UPon arrival to the ER, EKG shows idioventricular rhythm with rate in the low 30s. He is on no rate slowing medications. Blood work shows mildly elevated BUN, however, creatinine is close to baseline. Was seen in the office in December with HR in the 60s. Past Medical History:  
Diagnosis Date  Arthritis  Asthma   
 as a kid. none recent  Atrial flutter with rapid ventricular response ([address] 6/18/2014 1. Diagnosed 6/18/14: s/p ablation. 2. Echo (6/19/14): EF 45-50%. Moderate mitral regurgitation. 3. Ecardio (8/12-8/25/14): No atrial arrhythmias.  Basal cell carcinoma of skin of other and unspecified parts of face 2/20/2014  Benign hypertensive heart disease without heart failure 9/29/2015  BPH (benign prostatic hyperplasia) 4/16/2013  CAD (coronary artery disease) CABG, cardiac cath , stents times 2  
 Calculus of kidney 2/20/2014  Cancer ([address]  Carotid artery stenosis with cerebral infarction ([address] 9/29/2015 Followed by Dr. Derotha Severe.  Chest pain, unspecified 12/11/2013  CVA (cerebral vascular accident) [facility] 4/16/2013  Diarrhea following gastrointestinal surgery 4/16/2013  Diverticulitis colectomy  Dizziness 6/18/2014  Generalized weakness  GERD (gastroesophageal reflux disease)  Gout 4/16/2013  Heart disease, unspecified 2/20/2014  History of basal cell cancer nose  History of CVA (cerebrovascular accident) 6/18/2014 History of mini-strokes.  History of kidney stones 2006  Hyperlipidemia 4/16/2013  Hypertension  Hypertrophy of prostate with urinary obstruction and other lower urinary tract symptoms (LUTS) 2/20/2014  Hypothyroidism 4/16/2013  Incomplete bladder emptying 2/20/2014  Iron deficiency anemia 4/16/2013  
 NSTEMI (non-ST elevated myocardial infarction) ([facility] 12/12/2013  Osteoarthritis 4/16/2013  Platelet inhibition due to Plavix  Pure hypercholesterolemia 2/20/2014  Stroke ([facility] x3 with the last one being 2/12  Unspecified adverse effect of anesthesia   
 state he woke up during heart surgery  Unspecified asthma 2/20/2014  Unspecified disorder of thyroid 2/20/2014  Urinary frequency 2/20/2014  Urinary tract infection, site not specified 11/20/2013 Past Surgical History:  
Procedure Laterality Date  CABG, ARTERY-VEIN, FOUR  2005  HX ADENOIDECTOMY  HX APPENDECTOMY  age 15  
 HX CAROTID ENDARTERECTOMY Right 2011  HX CATARACT REMOVAL Bilateral 2006  HX CHOLECYSTECTOMY  HX CORONARY STENT PLACEMENT    
 HX OTHER SURGICAL  3/12/14 Plasma Button procedure of prostate  HX SMALL BOWEL RESECTION    
 HX TONSILLECTOMY  DE LEFT HEART CATH,PERCUTANEOUS  12/12/2013  
 bmsx2 svg to !st obtuse marginal  
 REMOVAL OF KIDNEY STONE  05/2016 [facility] ARTHROPLASTY  2007  
 right  TOTAL KNEE ARTHROPLASTY  2012  
 left Allergies Allergen Reactions  Other Medication Other (comments) Wife reports an unknown reaction to an unknown pain medicine. Cannot recall name or details. Social History Socioeconomic History  Marital status:  Spouse name: Not on file  Number of children: Not on file  Years of education: Not on file  Highest education level: Not on file Social Needs  Financial resource strain: Not on file  Food insecurity - worry: Not on file  Food insecurity - inability: Not on file  Transportation needs - medical: Not on file  Transportation needs - non-medical: Not on file Occupational History  Not on file Tobacco Use  Smoking status: Former Smoker Types: Cigars Last attempt to quit: 2002 Years since quittin.1  Smokeless tobacco: Never Used  Tobacco comment: quit - cigars Substance and Sexual Activity  Alcohol use: No  
  Alcohol/week: 0.0 oz  
  Comment: rare  Drug use: No  
 Sexual activity: Not Currently Other Topics Concern  Not on file Social History Narrative  Not on file Family History Problem Relation Age of Onset  Heart Disease Father  Heart Attack Father  Stroke Sister  Cancer Sister   
     brain  Heart Disease Mother  Bleeding Prob Mother 80  
     breast cancer Current Facility-Administered Medications Medication Dose Route Frequency  0.9% sodium chloride infusion  75 mL/hr IntraVENous CONTINUOUS Current Outpatient Medications Medication Sig  
 atorvastatin (LIPITOR) 80 mg tablet Take 1 Tab by mouth daily.  levothyroxine (SYNTHROID) 137 mcg tablet Take  by mouth Daily (before breakfast).  OTHER Trapeze bar for bed mobility.  amLODIPine (NORVASC) 5 mg tablet TAKE 1 TABLET BY MOUTH DAILY  escitalopram oxalate (LEXAPRO) 10 mg tablet TAKE 1 TABLET BY MOUTH EVERY MORNING.  hydroCHLOROthiazide (HYDRODIURIL) 25 mg tablet TAKE 1/2 TABLET BY MOUTH DAILY  losartan (COZAAR) 100 mg tablet TAKE 1 TABLET BY MOUTH DAILY  pantoprazole (PROTONIX) 40 mg tablet TAKE 1 TABLET BY MOUTH DAILY.  aspirin delayed-release 81 mg tablet Take 81 mg by mouth.  cyanocobalamin (VITAMIN B-12) 1,000 mcg/mL injection 1 ml injected intramuscularly once monthly  OTHER Wedges for propping in bed.  ELIQUIS 2.5 mg tablet Take 1 Tab by mouth two (2) times a day.  nitroglycerin (NITROSTAT) 0.4 mg SL tablet 1 Tab by SubLINGual route every five (5) minutes as needed for Chest Pain.  colchicine (MITIGARE) 0.6 mg capsule Take 0.6 mg by mouth as needed. Review of Systems Review of Systems Respiratory: Negative for shortness of breath. Cardiovascular: Negative for chest pain. Neurological: Negative for dizziness, speech change and loss of consciousness. All other systems reviewed and are negative. Subjective:  
 
Visit Vitals /66 Pulse (!) 34 Temp 97.9 °F (36.6 °C) Resp 24 Ht 6' 1\" (1.854 m) Wt 99.8 kg (220 lb) SpO2 97% BMI 29.03 kg/m² Physical Exam  
Constitutional: He is well-developed, well-nourished, and in no distress. Cardiovascular: Regular rhythm. Bradycardia present. Pulmonary/Chest: Effort normal and breath sounds normal.  
Abdominal: Soft. Bowel sounds are normal.  
Musculoskeletal:  
Trivial edema in BLE Neurological: He is alert. Skin: Skin is warm and dry. Psychiatric: Affect normal.  
 
 
Cardiographics Telemetry: idioventricular rhythm ECG: IVR with rate in the 30s Echocardiogram: last echo on file showed EF 50-54% Labs:  
Recent Results (from the past 24 hour(s)) EKG, 12 LEAD, INITIAL Collection Time: 02/04/19  9:20 PM  
Result Value Ref Range Ventricular Rate 36 BPM  
 Atrial Rate 38 BPM  
 QRS Duration 182 ms Q-T Interval 566 ms  
 QTC Calculation (Bezet) 437 ms Calculated R Axis -69 degrees Calculated T Axis 107 degrees Diagnosis Idioventricular rhythm Right bundle branch block Left anterior fascicular block 
!!! Bifascicular block !!! Left ventricular hypertrophy with repolarization abnormality Abnormal ECG When compared with ECG of 28-MAR-2018 12:18, 
Idioventricular rhythm has replaced Sinus rhythm Vent. rate has decreased BY  27 BPM 
  
CBC WITH AUTOMATED DIFF Collection Time: 02/04/19  9:35 PM  
Result Value Ref Range WBC 10.9 4.3 - 11.1 K/uL  
 RBC 4.84 4.23 - 5.6 M/uL  
 HGB 13.2 (L) 13.6 - 17.2 g/dL HCT 40.5 (L) 41.1 - 50.3 % MCV 83.7 79.6 - 97.8 FL  
 MCH 27.3 26.1 - 32.9 PG  
 MCHC 32.6 31.4 - 35.0 g/dL  
 RDW 14.3 11.9 - 14.6 % PLATELET 228 189 - 815 K/uL MPV 12.5 (H) 9.4 - 12.3 FL ABSOLUTE NRBC 0.00 0.0 - 0.2 K/uL  
 DF AUTOMATED NEUTROPHILS 52 43 - 78 % LYMPHOCYTES 30 13 - 44 % MONOCYTES 8 4.0 - 12.0 % EOSINOPHILS 8 (H) 0.5 - 7.8 % BASOPHILS 1 0.0 - 2.0 % IMMATURE GRANULOCYTES 1 0.0 - 5.0 %  
 ABS. NEUTROPHILS 5.7 1.7 - 8.2 K/UL  
 ABS. LYMPHOCYTES 3.3 0.5 - 4.6 K/UL  
 ABS. MONOCYTES 0.9 0.1 - 1.3 K/UL  
 ABS. EOSINOPHILS 0.9 (H) 0.0 - 0.8 K/UL  
 ABS. BASOPHILS 0.1 0.0 - 0.2 K/UL  
 ABS. IMM. GRANS. 0.1 0.0 - 0.5 K/UL  
TSH 3RD GENERATION Collection Time: 02/04/19  9:35 PM  
Result Value Ref Range TSH 3.380 0.358 - 3.740 uIU/mL  
TROPONIN I Collection Time: 02/04/19  9:35 PM  
Result Value Ref Range Troponin-I, Qt. 0.02 0.02 - 0.05 NG/ML  
BNP Collection Time: 02/04/19  9:35 PM  
Result Value Ref Range BNP 97 (H) 0 pg/mL POC TROPONIN-I Collection Time: 02/04/19  9:39 PM  
Result Value Ref Range Troponin-I (POC) 0.01 (L) 0.02 - 0.05 ng/ml MAGNESIUM Collection Time: 02/04/19 10:11 PM  
Result Value Ref Range Magnesium 1.9 1.8 - 2.4 mg/dL METABOLIC PANEL, COMPREHENSIVE Collection Time: 02/04/19 10:11 PM  
Result Value Ref Range Sodium 138 136 - 145 mmol/L Potassium 3.7 3.5 - 5.1 mmol/L  Chloride 106 98 - 107 mmol/L  
 CO2 24 21 - 32 mmol/L  
 Anion gap 8 7 - 16 mmol/L Glucose 111 (H) 65 - 100 mg/dL BUN 46 (H) 8 - 23 MG/DL Creatinine 2.04 (H) 0.8 - 1.5 MG/DL  
 GFR est AA 40 (L) >60 ml/min/1.73m2 GFR est non-AA 33 (L) >60 ml/min/1.73m2 Calcium 8.0 (L) 8.3 - 10.4 MG/DL Bilirubin, total 0.3 0.2 - 1.1 MG/DL  
 ALT (SGPT) 36 12 - 65 U/L  
 AST (SGOT) 21 15 - 37 U/L Alk. phosphatase 97 50 - 136 U/L Protein, total 6.8 6.3 - 8.2 g/dL Albumin 2.9 (L) 3.2 - 4.6 g/dL Globulin 3.9 (H) 2.3 - 3.5 g/dL A-G Ratio 0.7 (L) 1.2 - 3.5 GLUCOSE, POC Collection Time: 02/04/19 11:58 PM  
Result Value Ref Range Glucose (POC) 95 65 - 100 mg/dL Patient has been seen and examined by Dr. Olivia Denise and he agrees with the following assessment and plan: 
 
 Assessment/Plan:  
  
  Bradycardia -- admit to telemetry. Zoll in place. Currently asymptomatic. NPO now with IV hydration. EP to see in the AM regarding possible need for PPM. Not on rate slowing medications. (Family concerned about bilateral carotid artery stenosis and anesthesia. Reports that he can not be taken off his Eliquis or he will have a stroke). Hypertension -- holding BP medications for now. Start IV hydralazine Q6 prn for HTN if needed. CAD (coronary artery disease) -- denies anginal symptoms. Continue ASA and statin. Overview: 1. CABG 2005: LIMA to LAD, SVG to diagonal, SVG to OM and SVG to RCA 2. NSTEMI (12/11/13): Peak troponin 4.66. 
     A. LHC: Severe 3 vessel CAD. Occluded SVG to PDA and diagonal. Stenosis  
    in SVG to OM. B. PCI of SVG to OM: Distal body treated with 4 x 18 mm vision BMS. Prox  
    body lesion treated with integrity 4 x 18 BMS. C. Echo: Normal LV systolic function. EF 55-60%. Mild mitral/tricuspid  
    regurgitation. 3. Echo (9/23/14): EF 50-55%. Mild LAE. Mild mitral regurgitation. Hypothyroidism -- continue home dose Synthroid Hyperlipidemia -- continue statin therapy. Gout -- holding colchicine due to excessive diarrhea. Paroxysmal atrial fibrillation -- on Eliquis 2.5mg BID for CVA prevention. Currently IVR on the monitor. Wife reports that Eliquis can not be stopped or he will have a stroke. Overview: 1. Atrial flutter Diagnosed 6/18/14: s/p ablation. 2. Echo (6/19/14): EF 45-50%. Moderate mitral regurgitation. 3. Ecardio (8/12-8/25/14): No atrial arrhythmias. 4.  Admitted with CVA (3/18): Atrial fibrillation noted. Eliquis  
    started. History of CVA (cerebrovascular accident) Overview: History of mini-strokes. Debility -- has been bed-bound for last 9 months. Only able to get up with physical therapy and walker.   
 
 
 
Jigna Ramachandran NP 
2/5/2019 12:12 AM

## 2019-02-05 NOTE — PROGRESS NOTES
Per Nancy Hernandez, NP to hold all morning meds for possible pacemaker placement. Went to get family to sign consent and family refuses to sign consent until MD comes to bedside to talk to them. EP lab made aware.

## 2019-02-05 NOTE — PROGRESS NOTES
made initial visit. Pt was alert and verbal appearing comfortable with no pain level expressed or observed. Pt's wife and son in law were present.  welcomed them to DT and shared information about  services.  provided spiritual care through presence, pastoral conversation, and assurance of prayer.

## 2019-02-05 NOTE — PROGRESS NOTES
TRANSFER - IN REPORT: 
 
Verbal report received from ALDAIR Conley on Bhavesh Camacho being received from ER for routine progression of care Report consisted of patients Situation, Background, Assessment and Recommendations(SBAR). Information from the following report(s) ED Summary was reviewed with the receiving nurse. Opportunity for questions and clarification was provided. Assessment completed upon patients arrival to unit and care assumed.

## 2019-02-05 NOTE — PROGRESS NOTES
In accordance with Medicare guidelines, a copy of the Medicare Outpatient Observation Notice was provided to the patient's wife. Oral explanation was provided and all questions answered. This MOON document was signed by patient's wife & placed in the medical record under media tab. Copy provided to patient's wife.  notified.

## 2019-02-05 NOTE — ED TRIAGE NOTES
Pt coming from home, EMS called out after wife checked BP and also checked his HR of 30. Pt has hx of CABG and TIAs. Pt denies chest pain, denies shob, no pacemaker, pt is A&O x4, equal sensation, equal strength, symmetrical face.

## 2019-02-05 NOTE — PROGRESS NOTES
Below is patient's last visit: 
 
Office Visit 1/30/2019 [facility] MD  
Family Practice Acute gout of left hand, unspecified cause +9 more Patient receives home health from [facility]; Including PT and Nursing. Patient recently started to progress with PT. Walked across the room with walker the most progress in about 9 months. His wife is primary caretaker. She also has a caretaker to assist her MWF.

## 2019-02-05 NOTE — ED PROVIDER NOTES
Presents with complaint of bradycardia. EMS reports patient's wife took his vital signs today and his heart rate was in the 30s. He denies any chest pain shortness of breath nausea vomiting or syncope. Patient is nonambulatory secondary to previous stroke. He does live at home. Wife called the home health nurse and they told him to call 911. Wife reports diarrhea with gout medication for 3 days. The history is provided by the patient, the spouse and the EMS personnel. Slow Heart Rate This is a new problem. The current episode started 1 to 2 hours ago. The problem has not changed since onset. The problem occurs constantly. The pain is associated with normal activity. The patient is experiencing no pain. Pertinent negatives include no abdominal pain, no diaphoresis, no fever, no lower extremity edema and no shortness of breath. He has tried nothing for the symptoms. Risk factors include cardiac disease. Procedural history includes echocardiogram and CABG. Past Medical History:  
Diagnosis Date  Arthritis  Asthma   
 as a kid. none recent  Atrial flutter with rapid ventricular response ([address] 6/18/2014 1. Diagnosed 6/18/14: s/p ablation. 2. Echo (6/19/14): EF 45-50%. Moderate mitral regurgitation. 3. Ecardio (8/12-8/25/14): No atrial arrhythmias.  Basal cell carcinoma of skin of other and unspecified parts of face 2/20/2014  Benign hypertensive heart disease without heart failure 9/29/2015  BPH (benign prostatic hyperplasia) 4/16/2013  CAD (coronary artery disease) CABG, cardiac cath , stents times 2  
 Calculus of kidney 2/20/2014  Cancer ([address]  Carotid artery stenosis with cerebral infarction ([address] 9/29/2015 Followed by Dr. Judd Rodriguez.  Chest pain, unspecified 12/11/2013  CVA (cerebral vascular accident) ([address] 4/16/2013  Diarrhea following gastrointestinal surgery 4/16/2013  Diverticulitis colectomy  Dizziness 6/18/2014  Generalized weakness  GERD (gastroesophageal reflux disease)  Gout 4/16/2013  Heart disease, unspecified 2/20/2014  History of basal cell cancer nose  History of CVA (cerebrovascular accident) 6/18/2014 History of mini-strokes.  History of kidney stones 2006  Hyperlipidemia 4/16/2013  Hypertension  Hypertrophy of prostate with urinary obstruction and other lower urinary tract symptoms (LUTS) 2/20/2014  Hypothyroidism 4/16/2013  Incomplete bladder emptying 2/20/2014  Iron deficiency anemia 4/16/2013  
 NSTEMI (non-ST elevated myocardial infarction) ([facility] 12/12/2013  Osteoarthritis 4/16/2013  Platelet inhibition due to Plavix  Pure hypercholesterolemia 2/20/2014  Stroke ([facility] x3 with the last one being 2/12  Unspecified adverse effect of anesthesia   
 state he woke up during heart surgery  Unspecified asthma 2/20/2014  Unspecified disorder of thyroid 2/20/2014  Urinary frequency 2/20/2014  Urinary tract infection, site not specified 11/20/2013 Past Surgical History:  
Procedure Laterality Date  CABG, ARTERY-VEIN, FOUR  2005  HX ADENOIDECTOMY  HX APPENDECTOMY  age 15  
 HX CAROTID ENDARTERECTOMY Right 2011  HX CATARACT REMOVAL Bilateral 2006  HX CHOLECYSTECTOMY  HX CORONARY STENT PLACEMENT    
 HX OTHER SURGICAL  3/12/14 Plasma Button procedure of prostate  HX SMALL BOWEL RESECTION    
 HX TONSILLECTOMY  [place] LEFT HEART CATH,PERCUTANEOUS  12/12/2013  
 bmsx2 svg to !st obtuse marginal  
 REMOVAL OF KIDNEY STONE  05/2016 [facility] ARTHROPLASTY  2007  
 right  TOTAL KNEE ARTHROPLASTY  2012  
 left Family History:  
Problem Relation Age of Onset  Heart Disease Father  Heart Attack Father  Stroke Sister  Cancer Sister   
     brain  Heart Disease Mother  Bleeding Prob Mother 80  
     breast cancer Social History Socioeconomic History  Marital status:  Spouse name: Not on file  Number of children: Not on file  Years of education: Not on file  Highest education level: Not on file Social Needs  Financial resource strain: Not on file  Food insecurity - worry: Not on file  Food insecurity - inability: Not on file  Transportation needs - medical: Not on file  Transportation needs - non-medical: Not on file Occupational History  Not on file Tobacco Use  Smoking status: Former Smoker Types: Cigars Last attempt to quit: 2002 Years since quittin.1  Smokeless tobacco: Never Used  Tobacco comment: quit - cigars Substance and Sexual Activity  Alcohol use: No  
  Alcohol/week: 0.0 oz  
  Comment: rare  Drug use: No  
 Sexual activity: Not Currently Other Topics Concern  Not on file Social History Narrative  Not on file ALLERGIES: Other medication Review of Systems Constitutional: Negative for diaphoresis and fever. Respiratory: Negative for shortness of breath. Gastrointestinal: Negative for abdominal pain. All other systems reviewed and are negative. Vitals:  
 19 2140 19 2212 19 2217 19 2247 BP: 139/65 145/67 169/69 157/66 Pulse: (!) 36 (!) 35 (!) 35 (!) 34 Resp: 23 19 22 24 Temp:      
SpO2: 98% 97% 97% 97% Weight:      
Height:      
      
 
Physical Exam  
Constitutional: He appears well-developed and well-nourished. No distress. HENT:  
Head: Normocephalic and atraumatic. Eyes: Conjunctivae are normal. Right eye exhibits no discharge. Left eye exhibits no discharge. Neck: Normal range of motion. Neck supple. Cardiovascular: Regular rhythm. Bradycardia present. Pulmonary/Chest: Effort normal and breath sounds normal. No respiratory distress. Abdominal: Soft. He exhibits no distension. There is no tenderness. Musculoskeletal: Normal range of motion. He exhibits no edema. Neurological: He is alert. No cranial nerve deficit. baseline Skin: Skin is warm and dry. Capillary refill takes less than 2 seconds. He is not diaphoretic. Psychiatric: He has a normal mood and affect. His behavior is normal.  
Nursing note and vitals reviewed. MDM Number of Diagnoses or Management Options Diagnosis management comments: D/w pt and family findings and plan Amount and/or Complexity of Data Reviewed Clinical lab tests: reviewed and ordered Decide to obtain previous medical records or to obtain history from someone other than the patient: yes (Ems 
) Review and summarize past medical records: yes (Previously admitted for CVA has had a significant decline in his health status over the past year. Has been seen by Dr. Billie Washington for A. fib) Discuss the patient with other providers: yes (Dr. Reuben Meyer) Independent visualization of images, tracings, or specimens: yes (Bradycardia, heart block) Risk of Complications, Morbidity, and/or Mortality Presenting problems: high Diagnostic procedures: moderate Management options: high Patient Progress Patient progress: improved Procedures

## 2019-02-05 NOTE — MANAGEMENT PLAN
EP Attending Note: 
 
Due to multiple factors including patient eating when NPO and mild diarrhea will hold off procedure (Pacemaker) today. Will consider tomorrow if pt remains free of infection. Corie Murphy. Alexa Sellers MD, MS Clinical Cardiac Electrophysiology [facility]

## 2019-02-05 NOTE — PROGRESS NOTES
Care Management Interventions PCP Verified by CM: Yes(last week) Palliative Care Criteria Met (RRAT>21 & CHF Dx)?: No(RRAT 20 Dx Bradycardia) Transition of Care Consult (CM Consult): Discharge Planning, Home Health [address]: No 
Reason Outside [place]: Patient already serviced by other home care/hospice agency([facility] [address] [facility] RN/PT) Discharge Durable Medical Equipment: No(Walker, [facility], Wheelchair, Chair lift. hospital bed) Physical Therapy Consult: No 
Occupational Therapy Consult: No 
Speech Therapy Consult: No 
Current Support Network: Lives with Spouse Confirm Follow Up Transport: Other (see comment)(ambulance) Plan discussed with Pt/Family/Caregiver: Yes Freedom of Choice Offered: Yes Discharge Location Discharge Placement: Home with home health Met with patient and wife for d/c planning. Patient confused and wife is his primary care giver. Patient alert and smiling in the bed. Per wife he is bed bound or wheelchair bound but has been seeing [facility] RN/PT and for first time was able to take a few steps with PT. DME includes walker, BSC, wheelchair, chair lift and hospital bed. He has Medicare, [employer] and is able to obtain his medications at [facility] in [facility]. He requires ambulance transfer to home and physician appointments. He is current with [facility] for RN/PT which family would like resumed. Patient for probable pacemaker insertion in am. Current d/c plan is home with [facility] and wife. CM following.

## 2019-02-05 NOTE — PROGRESS NOTES
Skin assessment completed. Sacrum is dry and intact. Sacrum is purple but blanchable. Heels are dry, intact, and red but blanchable. Large scare on abdomin.  Scabs on middle of chest.

## 2019-02-06 ENCOUNTER — APPOINTMENT (OUTPATIENT)
Dept: GENERAL RADIOLOGY | Age: 84
DRG: 243 | End: 2019-02-06
Attending: INTERNAL MEDICINE
Payer: MEDICARE

## 2019-02-06 ENCOUNTER — ANESTHESIA (OUTPATIENT)
Dept: SURGERY | Age: 84
DRG: 243 | End: 2019-02-06
Payer: MEDICARE

## 2019-02-06 ENCOUNTER — ANESTHESIA EVENT (OUTPATIENT)
Dept: SURGERY | Age: 84
DRG: 243 | End: 2019-02-06
Payer: MEDICARE

## 2019-02-06 LAB
ANION GAP SERPL CALC-SCNC: 11 MMOL/L (ref 7–16)
ATRIAL RATE: 32 BPM
BUN SERPL-MCNC: 48 MG/DL (ref 8–23)
CALCIUM SERPL-MCNC: 7.9 MG/DL (ref 8.3–10.4)
CALCULATED P AXIS, ECG09: 61 DEGREES
CALCULATED R AXIS, ECG10: -67 DEGREES
CALCULATED T AXIS, ECG11: 112 DEGREES
CHLORIDE SERPL-SCNC: 112 MMOL/L (ref 98–107)
CO2 SERPL-SCNC: 21 MMOL/L (ref 21–32)
CREAT SERPL-MCNC: 2.11 MG/DL (ref 0.8–1.5)
DIAGNOSIS, 93000: NORMAL
GLUCOSE SERPL-MCNC: 82 MG/DL (ref 65–100)
P-R INTERVAL, ECG05: 214 MS
POTASSIUM SERPL-SCNC: 3.7 MMOL/L (ref 3.5–5.1)
Q-T INTERVAL, ECG07: 610 MS
QRS DURATION, ECG06: 182 MS
QTC CALCULATION (BEZET), ECG08: 445 MS
SODIUM SERPL-SCNC: 144 MMOL/L (ref 136–145)
VENTRICULAR RATE, ECG03: 32 BPM

## 2019-02-06 PROCEDURE — 77030012935 HC DRSG AQUACEL BMS -B

## 2019-02-06 PROCEDURE — 99218 HC RM OBSERVATION: CPT

## 2019-02-06 PROCEDURE — 93306 TTE W/DOPPLER COMPLETE: CPT

## 2019-02-06 PROCEDURE — C1894 INTRO/SHEATH, NON-LASER: HCPCS

## 2019-02-06 PROCEDURE — C1898 LEAD, PMKR, OTHER THAN TRANS: HCPCS

## 2019-02-06 PROCEDURE — 33208 INSRT HEART PM ATRIAL & VENT: CPT

## 2019-02-06 PROCEDURE — 77010033678 HC OXYGEN DAILY

## 2019-02-06 PROCEDURE — 0JH607Z INSERTION OF CARDIAC RESYNCHRONIZATION PACEMAKER PULSE GENERATOR INTO CHEST SUBCUTANEOUS TISSUE AND FASCIA, OPEN APPROACH: ICD-10-PCS | Performed by: INTERNAL MEDICINE

## 2019-02-06 PROCEDURE — C1892 INTRO/SHEATH,FIXED,PEEL-AWAY: HCPCS

## 2019-02-06 PROCEDURE — 71045 X-RAY EXAM CHEST 1 VIEW: CPT

## 2019-02-06 PROCEDURE — C2621 PMKR, OTHER THAN SING/DUAL: HCPCS

## 2019-02-06 PROCEDURE — 93005 ELECTROCARDIOGRAM TRACING: CPT | Performed by: INTERNAL MEDICINE

## 2019-02-06 PROCEDURE — A4565 SLINGS: HCPCS

## 2019-02-06 PROCEDURE — 77030019605

## 2019-02-06 PROCEDURE — 74011250636 HC RX REV CODE- 250/636

## 2019-02-06 PROCEDURE — 36415 COLL VENOUS BLD VENIPUNCTURE: CPT

## 2019-02-06 PROCEDURE — C1887 CATHETER, GUIDING: HCPCS

## 2019-02-06 PROCEDURE — 74011250637 HC RX REV CODE- 250/637: Performed by: INTERNAL MEDICINE

## 2019-02-06 PROCEDURE — 77030008467 HC STPLR SKN COVD -B

## 2019-02-06 PROCEDURE — 02HL3JZ INSERTION OF PACEMAKER LEAD INTO LEFT VENTRICLE, PERCUTANEOUS APPROACH: ICD-10-PCS | Performed by: INTERNAL MEDICINE

## 2019-02-06 PROCEDURE — 74011000250 HC RX REV CODE- 250: Performed by: INTERNAL MEDICINE

## 2019-02-06 PROCEDURE — 74011250636 HC RX REV CODE- 250/636: Performed by: NURSE PRACTITIONER

## 2019-02-06 PROCEDURE — 74011000250 HC RX REV CODE- 250

## 2019-02-06 PROCEDURE — C1769 GUIDE WIRE: HCPCS

## 2019-02-06 PROCEDURE — 74011250637 HC RX REV CODE- 250/637: Performed by: NURSE PRACTITIONER

## 2019-02-06 PROCEDURE — 74011000272 HC RX REV CODE- 272: Performed by: INTERNAL MEDICINE

## 2019-02-06 PROCEDURE — C1751 CATH, INF, PER/CENT/MIDLINE: HCPCS

## 2019-02-06 PROCEDURE — C1900 LEAD, CORONARY VENOUS: HCPCS

## 2019-02-06 PROCEDURE — 80048 BASIC METABOLIC PNL TOTAL CA: CPT

## 2019-02-06 PROCEDURE — 77030013687 HC GD NDL BARD -B

## 2019-02-06 PROCEDURE — 33225 L VENTRIC PACING LEAD ADD-ON: CPT

## 2019-02-06 PROCEDURE — 94761 N-INVAS EAR/PLS OXIMETRY MLT: CPT

## 2019-02-06 PROCEDURE — 02HK3JZ INSERTION OF PACEMAKER LEAD INTO RIGHT VENTRICLE, PERCUTANEOUS APPROACH: ICD-10-PCS | Performed by: INTERNAL MEDICINE

## 2019-02-06 PROCEDURE — 76060000035 HC ANESTHESIA 2 TO 2.5 HR: Performed by: INTERNAL MEDICINE

## 2019-02-06 PROCEDURE — 74011636320 HC RX REV CODE- 636/320: Performed by: INTERNAL MEDICINE

## 2019-02-06 PROCEDURE — 65660000000 HC RM CCU STEPDOWN

## 2019-02-06 RX ORDER — ONDANSETRON 2 MG/ML
INJECTION INTRAMUSCULAR; INTRAVENOUS AS NEEDED
Status: DISCONTINUED | OUTPATIENT
Start: 2019-02-06 | End: 2019-02-06 | Stop reason: HOSPADM

## 2019-02-06 RX ORDER — OXYCODONE HYDROCHLORIDE 5 MG/1
5 TABLET ORAL
Status: ACTIVE | OUTPATIENT
Start: 2019-02-06 | End: 2019-02-07

## 2019-02-06 RX ORDER — DIPHENHYDRAMINE HYDROCHLORIDE 50 MG/ML
12.5 INJECTION, SOLUTION INTRAMUSCULAR; INTRAVENOUS
Status: DISCONTINUED | OUTPATIENT
Start: 2019-02-06 | End: 2019-02-11 | Stop reason: HOSPADM

## 2019-02-06 RX ORDER — SODIUM CHLORIDE 0.9 % (FLUSH) 0.9 %
5-40 SYRINGE (ML) INJECTION EVERY 8 HOURS
Status: DISCONTINUED | OUTPATIENT
Start: 2019-02-06 | End: 2019-02-11 | Stop reason: HOSPADM

## 2019-02-06 RX ORDER — OXYCODONE HYDROCHLORIDE 5 MG/1
10 TABLET ORAL
Status: ACTIVE | OUTPATIENT
Start: 2019-02-06 | End: 2019-02-07

## 2019-02-06 RX ORDER — LIDOCAINE HYDROCHLORIDE 10 MG/ML
0.1 INJECTION INFILTRATION; PERINEURAL AS NEEDED
Status: DISCONTINUED | OUTPATIENT
Start: 2019-02-06 | End: 2019-02-11 | Stop reason: HOSPADM

## 2019-02-06 RX ORDER — PROPOFOL 10 MG/ML
INJECTION, EMULSION INTRAVENOUS AS NEEDED
Status: DISCONTINUED | OUTPATIENT
Start: 2019-02-06 | End: 2019-02-06 | Stop reason: HOSPADM

## 2019-02-06 RX ORDER — SODIUM CHLORIDE, SODIUM LACTATE, POTASSIUM CHLORIDE, CALCIUM CHLORIDE 600; 310; 30; 20 MG/100ML; MG/100ML; MG/100ML; MG/100ML
75 INJECTION, SOLUTION INTRAVENOUS CONTINUOUS
Status: DISPENSED | OUTPATIENT
Start: 2019-02-06 | End: 2019-02-07

## 2019-02-06 RX ORDER — BUPIVACAINE HYDROCHLORIDE AND EPINEPHRINE 5; 5 MG/ML; UG/ML
60 INJECTION, SOLUTION EPIDURAL; INTRACAUDAL; PERINEURAL ONCE
Status: COMPLETED | OUTPATIENT
Start: 2019-02-06 | End: 2019-02-06

## 2019-02-06 RX ORDER — FLUMAZENIL 0.1 MG/ML
0.2 INJECTION INTRAVENOUS AS NEEDED
Status: DISCONTINUED | OUTPATIENT
Start: 2019-02-06 | End: 2019-02-11 | Stop reason: HOSPADM

## 2019-02-06 RX ORDER — CEFAZOLIN SODIUM/WATER 2 G/20 ML
2 SYRINGE (ML) INTRAVENOUS EVERY 8 HOURS
Status: COMPLETED | OUTPATIENT
Start: 2019-02-07 | End: 2019-02-07

## 2019-02-06 RX ORDER — NALOXONE HYDROCHLORIDE 0.4 MG/ML
0.1 INJECTION, SOLUTION INTRAMUSCULAR; INTRAVENOUS; SUBCUTANEOUS
Status: DISCONTINUED | OUTPATIENT
Start: 2019-02-06 | End: 2019-02-11 | Stop reason: HOSPADM

## 2019-02-06 RX ORDER — EPHEDRINE SULFATE 50 MG/ML
INJECTION, SOLUTION INTRAVENOUS AS NEEDED
Status: DISCONTINUED | OUTPATIENT
Start: 2019-02-06 | End: 2019-02-06 | Stop reason: HOSPADM

## 2019-02-06 RX ORDER — CEFAZOLIN SODIUM 1 G/3ML
INJECTION, POWDER, FOR SOLUTION INTRAMUSCULAR; INTRAVENOUS AS NEEDED
Status: DISCONTINUED | OUTPATIENT
Start: 2019-02-06 | End: 2019-02-06 | Stop reason: HOSPADM

## 2019-02-06 RX ORDER — PROPOFOL 10 MG/ML
INJECTION, EMULSION INTRAVENOUS
Status: DISCONTINUED | OUTPATIENT
Start: 2019-02-06 | End: 2019-02-06 | Stop reason: HOSPADM

## 2019-02-06 RX ORDER — LIDOCAINE HYDROCHLORIDE 20 MG/ML
INJECTION, SOLUTION EPIDURAL; INFILTRATION; INTRACAUDAL; PERINEURAL AS NEEDED
Status: DISCONTINUED | OUTPATIENT
Start: 2019-02-06 | End: 2019-02-06 | Stop reason: HOSPADM

## 2019-02-06 RX ORDER — HYDROMORPHONE HYDROCHLORIDE 2 MG/ML
0.5 INJECTION, SOLUTION INTRAMUSCULAR; INTRAVENOUS; SUBCUTANEOUS
Status: DISCONTINUED | OUTPATIENT
Start: 2019-02-06 | End: 2019-02-11 | Stop reason: HOSPADM

## 2019-02-06 RX ORDER — SODIUM CHLORIDE, SODIUM LACTATE, POTASSIUM CHLORIDE, CALCIUM CHLORIDE 600; 310; 30; 20 MG/100ML; MG/100ML; MG/100ML; MG/100ML
75 INJECTION, SOLUTION INTRAVENOUS CONTINUOUS
Status: DISCONTINUED | OUTPATIENT
Start: 2019-02-06 | End: 2019-02-11

## 2019-02-06 RX ORDER — SODIUM CHLORIDE 0.9 % (FLUSH) 0.9 %
5-40 SYRINGE (ML) INJECTION AS NEEDED
Status: DISCONTINUED | OUTPATIENT
Start: 2019-02-06 | End: 2019-02-11 | Stop reason: HOSPADM

## 2019-02-06 RX ORDER — SODIUM CHLORIDE, SODIUM LACTATE, POTASSIUM CHLORIDE, CALCIUM CHLORIDE 600; 310; 30; 20 MG/100ML; MG/100ML; MG/100ML; MG/100ML
INJECTION, SOLUTION INTRAVENOUS
Status: DISCONTINUED | OUTPATIENT
Start: 2019-02-06 | End: 2019-02-06 | Stop reason: HOSPADM

## 2019-02-06 RX ORDER — GLYCOPYRROLATE 0.2 MG/ML
INJECTION INTRAMUSCULAR; INTRAVENOUS AS NEEDED
Status: DISCONTINUED | OUTPATIENT
Start: 2019-02-06 | End: 2019-02-06 | Stop reason: HOSPADM

## 2019-02-06 RX ADMIN — NEOMYCIN AND POLYMYXIN B SULFATES: 40; 200000 IRRIGANT IRRIGATION at 16:25

## 2019-02-06 RX ADMIN — SODIUM CHLORIDE, SODIUM LACTATE, POTASSIUM CHLORIDE, CALCIUM CHLORIDE: 600; 310; 30; 20 INJECTION, SOLUTION INTRAVENOUS at 15:47

## 2019-02-06 RX ADMIN — ONDANSETRON 4 MG: 2 INJECTION INTRAMUSCULAR; INTRAVENOUS at 17:38

## 2019-02-06 RX ADMIN — ATORVASTATIN CALCIUM 80 MG: 40 TABLET, FILM COATED ORAL at 08:30

## 2019-02-06 RX ADMIN — Medication 10 ML: at 22:04

## 2019-02-06 RX ADMIN — SODIUM CHLORIDE 75 ML/HR: 900 INJECTION, SOLUTION INTRAVENOUS at 01:26

## 2019-02-06 RX ADMIN — PROPOFOL 40 MG: 10 INJECTION, EMULSION INTRAVENOUS at 15:55

## 2019-02-06 RX ADMIN — CEFAZOLIN SODIUM 2 G: 1 INJECTION, POWDER, FOR SOLUTION INTRAMUSCULAR; INTRAVENOUS at 15:55

## 2019-02-06 RX ADMIN — BUPIVACAINE HYDROCHLORIDE AND EPINEPHRINE BITARTRATE 300 MG: 5; .005 INJECTION, SOLUTION EPIDURAL; INTRACAUDAL; PERINEURAL at 16:27

## 2019-02-06 RX ADMIN — PROPOFOL 50 MCG/KG/MIN: 10 INJECTION, EMULSION INTRAVENOUS at 15:55

## 2019-02-06 RX ADMIN — GLYCOPYRROLATE 0.2 MG: 0.2 INJECTION INTRAMUSCULAR; INTRAVENOUS at 16:03

## 2019-02-06 RX ADMIN — EPHEDRINE SULFATE 5 MG: 50 INJECTION, SOLUTION INTRAVENOUS at 17:16

## 2019-02-06 RX ADMIN — EPHEDRINE SULFATE 5 MG: 50 INJECTION, SOLUTION INTRAVENOUS at 17:02

## 2019-02-06 RX ADMIN — ACETAMINOPHEN 650 MG: 325 TABLET, FILM COATED ORAL at 20:24

## 2019-02-06 RX ADMIN — EPHEDRINE SULFATE 10 MG: 50 INJECTION, SOLUTION INTRAVENOUS at 16:01

## 2019-02-06 RX ADMIN — IOPAMIDOL 35 ML: 755 INJECTION, SOLUTION INTRAVENOUS at 17:22

## 2019-02-06 RX ADMIN — EPHEDRINE SULFATE 5 MG: 50 INJECTION, SOLUTION INTRAVENOUS at 17:08

## 2019-02-06 RX ADMIN — LEVOTHYROXINE SODIUM 137 MCG: 50 TABLET ORAL at 08:30

## 2019-02-06 RX ADMIN — ASPIRIN 81 MG: 81 TABLET, COATED ORAL at 08:30

## 2019-02-06 RX ADMIN — LIDOCAINE HYDROCHLORIDE 40 MG: 20 INJECTION, SOLUTION EPIDURAL; INFILTRATION; INTRACAUDAL; PERINEURAL at 15:55

## 2019-02-06 RX ADMIN — PANTOPRAZOLE SODIUM 40 MG: 40 TABLET, DELAYED RELEASE ORAL at 08:30

## 2019-02-06 RX ADMIN — ESCITALOPRAM OXALATE 10 MG: 10 TABLET ORAL at 08:30

## 2019-02-06 NOTE — PROGRESS NOTES
TRANSFER - OUT REPORT: 
 
Verbal report given to ALDAIR Adams on Samuel Mccoy  being transferred to [facility] for routine post - op Report consisted of patients Situation, Background, Assessment and  
Recommendations(SBAR). Information from the following report(s) SBAR, Procedure Summary, Intake/Output, MAR and Cardiac Rhythm NSR was reviewed with the receiving nurse. Lines:  
Peripheral IV 02/04/19 Right Forearm (Active) Site Assessment Clean, dry, & intact; Clean;Dry 2/6/2019  6:00 PM  
Phlebitis Assessment 0 2/6/2019  6:00 PM  
Infiltration Assessment 0 2/6/2019  6:00 PM  
Dressing Status Clean, dry, & intact; Clean;Dry 2/6/2019  6:00 PM  
Dressing Type Tape;Transparent 2/6/2019  6:00 PM  
Hub Color/Line Status Patent; Infusing;Pink 2/6/2019  6:00 PM  
Action Taken Blood drawn 2/4/2019 11:05 PM  
Alcohol Cap Used No 2/4/2019 11:05 PM  
  
 
Opportunity for questions and clarification was provided.    
 
Patient transported with:

## 2019-02-06 NOTE — PHYSICIAN ADVISORY
Letter of Determination: Upgrade from Observation to Inpatient Status This patient was originally hospitalized as Outpatient Status with Observation Services on 2/4/2019 for bradycardia. This patient now meets for Inpatient Admission in accordance with CMS regulation Section 43 .3. Specifically, patient's stay is now over Two Midnights and was medically necessary. The patient's stay was medically necessary based on pulse to 34 beats per minute, and plan for urgent pacemaker placement. Consistent with CMS guidelines, patient meets for inpatient status. It is our recommendation that this patient's hospitalization status should be upgraded from OBSERVATION to INPATIENT status.  
  
The final decision regarding the patient's hospitalization status depends on the attending physician's judgement. Valentino Brasher MD, SREEKANTH, Physician Advisor [phone].

## 2019-02-06 NOTE — PROGRESS NOTES
Bedside and Verbal shift change report given to Alyssia Nino RN (oncoming nurse) by self Tianna archibald). Report included the following information SBAR, Kardex, MAR and Recent Results.

## 2019-02-06 NOTE — PROGRESS NOTES
Received report from [address] [place] [address] for continued care following EP procedure. Left chest wall dsg D&I. Drowsy.

## 2019-02-06 NOTE — ANESTHESIA POSTPROCEDURE EVALUATION
Procedure(s): PACEMAKER INSERTION. Anesthesia Post Evaluation Patient location during evaluation: PACU Patient participation: complete - patient participated Level of consciousness: awake Pain management: adequate Airway patency: patent Anesthetic complications: no 
Cardiovascular status: acceptable Respiratory status: spontaneous ventilation and acceptable Hydration status: acceptable Post anesthesia nausea and vomiting:  none Visit Vitals /62 Pulse 81 Temp 36 °C (96.8 °F) Resp 27 Ht 6' 1\" (1.854 m) Wt 95.2 kg (209 lb 12.8 oz) SpO2 98% BMI 27.68 kg/m²

## 2019-02-06 NOTE — PROGRESS NOTES
Problem: Falls - Risk of 
Goal: *Absence of Falls Document Francine Primes Fall Risk and appropriate interventions in the flowsheet. Outcome: Progressing Towards Goal 
Fall Risk Interventions: 
  
 
Mentation Interventions: Adequate sleep, hydration, pain control, Bed/chair exit alarm, Door open when patient unattended, More frequent rounding Medication Interventions: Bed/chair exit alarm, Evaluate medications/consider consulting pharmacy, Patient to call before getting OOB, Teach patient to arise slowly Elimination Interventions: Bed/chair exit alarm, Call light in reach, Toilet paper/wipes in reach, Toileting schedule/hourly rounds Problem: Pressure Injury - Risk of 
Goal: *Prevention of pressure injury Document Adam Scale and appropriate interventions in the flowsheet. Outcome: Progressing Towards Goal 
Pressure Injury Interventions: 
Sensory Interventions: Assess changes in LOC, Check visual cues for pain, Float heels, Keep linens dry and wrinkle-free Moisture Interventions: Apply protective barrier, creams and emollients, Check for incontinence Q2 hours and as needed, Limit adult briefs, Moisture barrier Activity Interventions: Chair cushion, Increase time out of bed, PT/OT evaluation Mobility Interventions: Assess need for specialty bed, Chair cushion, Float heels, HOB 30 degrees or less, Pressure redistribution bed/mattress (bed type), Turn and reposition approx. every two hours(pillow and wedges) Nutrition Interventions: Document food/fluid/supplement intake Friction and Shear Interventions: Apply protective barrier, creams and emollients, Foam dressings/transparent film/skin sealants, HOB 30 degrees or less

## 2019-02-06 NOTE — PROGRESS NOTES
MEWS score noted to be 4. Charge nurse, Fadi Mark RN informed and assessed patient. High MEWS score noted due to HR 35 . Vitals signs to be completed per protocol. Will continue to monitor.

## 2019-02-06 NOTE — ANESTHESIA PREPROCEDURE EVALUATION
Anesthetic History No history of anesthetic complications Review of Systems / Medical History Patient summary reviewed and pertinent labs reviewed Pulmonary Asthma (Childhood) Neuro/Psych  
 
 
CVA (2014): no residual symptoms Cardiovascular Hypertension Dysrhythmias (High degree AV block) CAD (s/p CABG 2005, NADIA 2013) and hyperlipidemia Exercise tolerance: <4 METS Comments: Echo 10/18 - EF 50-55%, mild-mod MR  
GI/Hepatic/Renal 
  
GERD: well controlled Endo/Other Hypothyroidism: well controlled Arthritis Other Findings Physical Exam 
 
Airway Mallampati: II 
TM Distance: 4 - 6 cm Neck ROM: normal range of motion Mouth opening: Normal 
 
 Cardiovascular Rhythm: irregular Rate: abnormal 
 
 
 
 Dental 
No notable dental hx Pulmonary Breath sounds clear to auscultation Abdominal 
 
 
 
 Other Findings Anesthetic Plan ASA: 3 Anesthesia type: total IV anesthesia Anesthetic plan and risks discussed with: Patient

## 2019-02-06 NOTE — PROGRESS NOTES
Bedside and Verbal shift change report given to self (oncoming nurse) by Eb Pastrana RN (offgoing nurse). Report included the following information SBAR, Kardex, MAR and Recent Results.

## 2019-02-06 NOTE — PROGRESS NOTES
Bedside and Verbal shift change report given to Fly Motley RN (oncoming nurse) by self (offgoing nurse). Report included the following information SBAR, Kardex, MAR and Recent Results.

## 2019-02-06 NOTE — PROGRESS NOTES
[facility] CARDIOLOGY PROGRESS NOTE 
      
 
2/6/2019 3:41 PM 
 
Admit Date: 2/4/2019 Admit Diagnosis: Bradycardia [R00.1]; Bradycardia [R00.1] Subjective: No complaints this AM, no chest pain or shortness of breath Interval History: (History of pertinent interval events obtained from nursing staff) Remains with HRs in the 30s ROS: 
GEN:  No fever or chills Cardiovascular:  As noted above Pulmonary:  As noted above Neuro:  No new focal motor or sensory loss Objective:  
 
Vitals:  
 02/06/19 0200 02/06/19 2479 02/06/19 0906 02/06/19 1152 BP: 90/41 152/46 178/84 133/53 Pulse: (!) 35 (!) 35 (!) 31 (!) 31 Resp: 17 17 17 Temp: 98.3 °F (36.8 °C) 97.6 °F (36.4 °C) 97.9 °F (36.6 °C) SpO2: 95% 98% 98% Weight:  95.2 kg (209 lb 12.8 oz) Height:      
 
 
Physical Exam: 
Karolyn Francis, Well Nourished, No Acute Distress Neck- supple, no JVD 
CV- regular, bradycardic, distant S1, S2 
Lung- clear bilaterally Abd- soft, nontender, nondistended Ext- no edema bilaterally. Skin- warm and dry Current Facility-Administered Medications Medication Dose Route Frequency  bacitracin 50,000 Units, neomycin-polymyxin B  2 mL in sterile water irrigation 1,000 mL irrigation   Irrigation ONCE  
 bupivacaine-EPINEPHrine (PF) (SENSORCAINE PF) 0.5 %-1:200,000 injection 300 mg  60 mL SubCUTAneous ONCE  
 atorvastatin (LIPITOR) tablet 80 mg  80 mg Oral DAILY  escitalopram oxalate (LEXAPRO) tablet 10 mg  10 mg Oral DAILY  levothyroxine (SYNTHROID) tablet 137 mcg  137 mcg Oral ACB  pantoprazole (PROTONIX) tablet 40 mg  40 mg Oral ACB  sodium chloride (NS) flush 5-40 mL  5-40 mL IntraVENous Q8H  
 sodium chloride (NS) flush 5-40 mL  5-40 mL IntraVENous PRN  
 nitroglycerin (NITROSTAT) tablet 0.4 mg  0.4 mg SubLINGual Q5MIN PRN  
 morphine injection 2 mg  2 mg IntraVENous Q4H PRN  
 acetaminophen (TYLENOL) tablet 650 mg  650 mg Oral Q4H PRN  
  aspirin delayed-release tablet 81 mg  81 mg Oral DAILY  0.9% sodium chloride infusion  75 mL/hr IntraVENous CONTINUOUS Data Review:  
Recent Results (from the past 24 hour(s)) METABOLIC PANEL, BASIC Collection Time: 02/06/19  3:57 AM  
Result Value Ref Range Sodium 144 136 - 145 mmol/L Potassium 3.7 3.5 - 5.1 mmol/L Chloride 112 (H) 98 - 107 mmol/L  
 CO2 21 21 - 32 mmol/L Anion gap 11 7 - 16 mmol/L Glucose 82 65 - 100 mg/dL BUN 48 (H) 8 - 23 MG/DL Creatinine 2.11 (H) 0.8 - 1.5 MG/DL  
 GFR est AA 39 (L) >60 ml/min/1.73m2 GFR est non-AA 32 (L) >60 ml/min/1.73m2 Calcium 7.9 (L) 8.3 - 10.4 MG/DL  
EKG, 12 LEAD, INITIAL Collection Time: 02/06/19  8:05 AM  
Result Value Ref Range Ventricular Rate 32 BPM  
 Atrial Rate 32 BPM  
 P-R Interval 214 ms QRS Duration 182 ms Q-T Interval 610 ms QTC Calculation (Bezet) 445 ms Calculated P Axis 61 degrees Calculated R Axis -67 degrees Calculated T Axis 112 degrees Diagnosis Marked sinus bradycardia with 1st degree A-V block Right bundle branch block Left anterior fascicular block 
!!! Bifascicular block !!! Left ventricular hypertrophy with repolarization abnormality Cannot rule out Septal infarct , age undetermined Abnormal ECG When compared with ECG of 04-FEB-2019 22:31, 
Previous ECG has undetermined rhythm, needs review T wave inversion now evident in Anterior leads QT has shortened Confirmed by TEQUILA CARDENAS (), Natalio Guevara ([id number]) on 2/6/2019 12:18:54 PM 
  
 
 
EKG:  (EKG has been independently visualized by me with interpretation below) Assessment:  
 
Principal Problem: 
  Bradycardia (2/4/2019) Active Problems: Hypertension (4/16/2013) CAD (coronary artery disease) (4/16/2013) Overview: 1. CABG 2005: LIMA to LAD, SVG to diagonal, SVG to OM and SVG to RCA 2. NSTEMI (12/11/13): Peak troponin 4.66. 
     A. LHC: Severe 3 vessel CAD. Occluded SVG to PDA and diagonal. Stenosis in SVG to OM. B. PCI of SVG to OM: Distal body treated with 4 x 18 mm vision BMS. Prox  
    body lesion treated with integrity 4 x 18 BMS. C. Echo: Normal LV systolic function. EF 55-60%. Mild mitral/tricuspid  
    regurgitation. 3. Echo (9/23/14): EF 50-55%. Mild LAE. Mild mitral regurgitation. Hypothyroidism (4/16/2013) Hyperlipidemia (4/16/2013) Gout (4/16/2013) Paroxysmal atrial fibrillation ([address] (6/18/2014) Overview: 1. Atrial flutter Diagnosed 6/18/14: s/p ablation. 2. Echo (6/19/14): EF 45-50%. Moderate mitral regurgitation. 3. Ecardio (8/12-8/25/14): No atrial arrhythmias. 4.  Admitted with CVA (3/18): Atrial fibrillation noted. Eliquis  
    started. History of CVA (cerebrovascular accident) (6/18/2014) Overview: History of mini-strokes. Plan: 1. Symptomatic bradycardia: plan for pacemaker as documented by Dr. Lali Quiroga for high degree AV block. Discussed in detail with Pt and Pt wife today and they wish to proceed. Ottoniel Toscano MD 
Cardiology/Electrophysiology

## 2019-02-06 NOTE — PROCEDURES
Pre-Procedure Diagnosis  1. Documented non-reversible symptomatic bradycardia due to third degree atrioventricular block. 2. Anticipated high degree of ventricular pacing    Procedure Performed  1. Insertion of Biotronik biventricular permanent pacemaker. 2. Insertion of left ventricular lead at time of new system Implantation. Anesthesia: MAC     Estimated Blood Loss: Less than 10 mL     Specimens: * No specimens in log *     The procedure, indications, risks, benefits, and alternatives were discussed with the patient and family members, who desired to proceed after questions were answered and informed consent was documented. Procedure: After informed consent was obtained, the patient was brought to the Electrophysiology Laboratory in a fasting state and was prepped and draped in sterile fashion. Prophylactic antibiotic was administered 10 minutes prior to skin incision (refer to anesthesia documentation for details). MAC was administered by the anesthesia team with continuous oxygen saturation measurement and blood pressure measurement. Local anesthetic (sensorcaine) was delivered to the left pectoral region and an incision was made parallel to the deltopectoral groove. A subcutaneous pocket was created using blunt dissection and electrocautery, and adequate hemostasis was established. Access x 3 was obtained under ultrasound guidance using a modified Seldinger technique with placement of 3 short wires down into the RA and advanced below the diaphragm. Next, placement of a peel-away sheath over the first guidewire was performed. A permanent RV pacemaker lead was then advanced under fluoroscopic guidance into the RV apex in a stable position with satisfactory sensing and pacing characteristics. The peel away sheath was removed. Another Safe-sheath was then placed over the second guidewire.  A permanent pacing lead was advanced under fluoroscopic guidance and positioned in the right atrium at the location of the RAA. Stable RA appendage position with satisfactory sensing characteristics were obtained. The sheath was peeled. The leads were sutured to the underlying pectoralis fascia using 2 non-absorbable sutures around each lead's collar. The lead slack and position was assessed under fluoroscopy while securing the lead collars to ensure proper length. After positioning the RA and RV leads, a standard Merit Anat LV delivery sheath was advanced over the long access wire and dilator and wire were removed. The braided inner sheath was then advanced into the [id number] and used to cannulate the coronary sinus using contrast when necessary. A Glide wire and was used to allow a smooth transition into the CS body. A coronary sinus venogram was then performed using a balloon occluding catheter. A Merit renal inner sheath was used with an Acuity Straight trak to cannulate a posterolateral branch. The renal was advanced into the posterolateral and a sub selected venogram was performed. The left ventricular lead was then advanced into a posterolateral brach over an angioplasty wire. Adequate thresholds were obtained with an adequate margin between phrenic stim and loss of capture. The delivery sheaths were then slit with fluoroscopy demonstrating stable position. The lead was then sutured to the underlying pectoralis fascia using 2 non-absorbable sutures around the lead collar. Final lead testing via the pacing system analyzer (PSA) demonstrated stable sensing, impedance and pacing thresholds. The lead pins were then cleaned with antibiotic soaked gauze, dried gently, and attached to a new pacemaker generator. Pins were directly observed to pass the tip electrode, and the ring hex wrench screws were secured, and leads tug tested. The device and leads were gently positioned within the pocket after the pocket was irrigated copiously with a saline antimicrobial solution.  The wound was closed with multiple layers of absorbable suture followed skin closure with staples. Fluoroscopic images were interpreted by me, in multiple projections. Final device position was confirmed by stored fluoroscopic image from device pocket to lead tips in AP projection. The patient tolerated the procedure well and left the lab in good condition. Lead Data:    Pulse Generator Model #  Serial # Location Implant/Explant   [id number] Biotronik [id number] Left Pectoral Implant     Lead Model Number  Serial Number Lead position Implant/Explant   RA [id number] Biotronik [id number] RA Appendage Implant   RV [id number] Biotronik [id number] RV Ramsey Implant   LV [id number] Biotronik [id number] LV Lateral Implant     Lead Sensitivity and Threshold  Lead R or P sensitivity (mv) Threshold (V) Threshold PW (msec) Impedance (ohms) Final output Voltage (V) Final PW (msec)   RA 3.9 0.9 0.4 429 3.6 0.4   RV -- 0.6 0.4 741 3.6 0.4   LV -- 2.6 1.5 936 3.6 1.5     Bradycardia Settings  Andrea Mode LRL URL Pace AVD (ms) Sense AVD (ms) Rate Response Mode Switching Mode SW Rate   DDD 60 120 180 150 Off On 160     Contrast: 35 cc    LV Pacing Configuration: [id number]     Fluoro Time: 19.2 min    Complications: None    IMPRESSION: Successful implantation of Biotronik biventricular permanent pacemaker    Solomon Toscano MD, MS  Clinical Cardiac Electrophysiology  2/6/2019  5:33 PM

## 2019-02-07 LAB
ANION GAP SERPL CALC-SCNC: 10 MMOL/L (ref 7–16)
ATRIAL RATE: 78 BPM
BUN SERPL-MCNC: 39 MG/DL (ref 8–23)
CALCIUM SERPL-MCNC: 8.3 MG/DL (ref 8.3–10.4)
CALCULATED P AXIS, ECG09: 72 DEGREES
CALCULATED R AXIS, ECG10: -94 DEGREES
CALCULATED T AXIS, ECG11: 65 DEGREES
CHLORIDE SERPL-SCNC: 110 MMOL/L (ref 98–107)
CO2 SERPL-SCNC: 21 MMOL/L (ref 21–32)
CREAT SERPL-MCNC: 2.32 MG/DL (ref 0.8–1.5)
DIAGNOSIS, 93000: NORMAL
GLUCOSE SERPL-MCNC: 145 MG/DL (ref 65–100)
P-R INTERVAL, ECG05: 166 MS
POTASSIUM SERPL-SCNC: 3.6 MMOL/L (ref 3.5–5.1)
Q-T INTERVAL, ECG07: 472 MS
QRS DURATION, ECG06: 168 MS
QTC CALCULATION (BEZET), ECG08: 538 MS
SODIUM SERPL-SCNC: 141 MMOL/L (ref 136–145)
VENTRICULAR RATE, ECG03: 78 BPM

## 2019-02-07 PROCEDURE — 86580 TB INTRADERMAL TEST: CPT | Performed by: INTERNAL MEDICINE

## 2019-02-07 PROCEDURE — 94760 N-INVAS EAR/PLS OXIMETRY 1: CPT

## 2019-02-07 PROCEDURE — 74011250637 HC RX REV CODE- 250/637: Performed by: INTERNAL MEDICINE

## 2019-02-07 PROCEDURE — 74011000302 HC RX REV CODE- 302: Performed by: INTERNAL MEDICINE

## 2019-02-07 PROCEDURE — 74011250636 HC RX REV CODE- 250/636: Performed by: INTERNAL MEDICINE

## 2019-02-07 PROCEDURE — 36415 COLL VENOUS BLD VENIPUNCTURE: CPT

## 2019-02-07 PROCEDURE — 74011250637 HC RX REV CODE- 250/637: Performed by: NURSE PRACTITIONER

## 2019-02-07 PROCEDURE — 77010033678 HC OXYGEN DAILY

## 2019-02-07 PROCEDURE — 65660000000 HC RM CCU STEPDOWN

## 2019-02-07 PROCEDURE — 80048 BASIC METABOLIC PNL TOTAL CA: CPT

## 2019-02-07 RX ORDER — TRAMADOL HYDROCHLORIDE 50 MG/1
50 TABLET ORAL
Qty: 10 TAB | Refills: 0 | Status: SHIPPED | OUTPATIENT
Start: 2019-02-07 | End: 2019-10-09

## 2019-02-07 RX ORDER — GUAIFENESIN 100 MG/5ML
100 SOLUTION ORAL
Status: DISCONTINUED | OUTPATIENT
Start: 2019-02-07 | End: 2019-02-11 | Stop reason: HOSPADM

## 2019-02-07 RX ADMIN — TUBERCULIN PURIFIED PROTEIN DERIVATIVE 5 UNITS: 5 INJECTION, SOLUTION INTRADERMAL at 13:04

## 2019-02-07 RX ADMIN — ASPIRIN 81 MG: 81 TABLET, COATED ORAL at 08:25

## 2019-02-07 RX ADMIN — Medication 5 ML: at 13:05

## 2019-02-07 RX ADMIN — ESCITALOPRAM OXALATE 10 MG: 10 TABLET ORAL at 08:25

## 2019-02-07 RX ADMIN — LEVOTHYROXINE SODIUM 137 MCG: 50 TABLET ORAL at 08:25

## 2019-02-07 RX ADMIN — Medication 10 ML: at 22:28

## 2019-02-07 RX ADMIN — Medication 10 ML: at 22:29

## 2019-02-07 RX ADMIN — APIXABAN 2.5 MG: 2.5 TABLET, FILM COATED ORAL at 22:26

## 2019-02-07 RX ADMIN — PANTOPRAZOLE SODIUM 40 MG: 40 TABLET, DELAYED RELEASE ORAL at 08:25

## 2019-02-07 RX ADMIN — APIXABAN 2.5 MG: 2.5 TABLET, FILM COATED ORAL at 08:26

## 2019-02-07 RX ADMIN — Medication 10 ML: at 05:12

## 2019-02-07 RX ADMIN — Medication 2 G: at 08:24

## 2019-02-07 RX ADMIN — ATORVASTATIN CALCIUM 80 MG: 40 TABLET, FILM COATED ORAL at 08:25

## 2019-02-07 RX ADMIN — Medication 2 G: at 01:03

## 2019-02-07 NOTE — PROGRESS NOTES
CM met with pt & spouse to discuss DCP. Spouse is concerned about being able to care for pt at home due to arm limitations. Requesting STR placement. [facility] Referrals sent through [employer]. PPD ordered. PT OT ordered. CM to continue to follow. Care Management Interventions PCP Verified by CM: Yes(last week) Palliative Care Criteria Met (RRAT>21 & CHF Dx)?: No(RRAT 20 Dx Bradycardia) Transition of Care Consult (CM Consult): Discharge Planning,  [address]: No 
Reason Outside [facility]: Patient already serviced by other home care/hospice agency([facility] [address] Tomlinson RN/PT) Discharge Durable Medical Equipment: No([facility], [facility], Wheelchair, Chair lift. hospital bed) Physical Therapy Consult: No 
Occupational Therapy Consult: No 
Speech Therapy Consult: No 
Current Support Network: Lives with Spouse Confirm Follow Up Transport: Family Plan discussed with Pt/Family/Caregiver: Yes Freedom of Choice Offered: Yes Discharge Location Discharge Placement: Rehab hospital/unit acute

## 2019-02-07 NOTE — PROGRESS NOTES
Problem: Falls - Risk of 
Goal: *Absence of Falls Document Kasey Gunderson Fall Risk and appropriate interventions in the flowsheet. Outcome: Progressing Towards Goal 
Fall Risk Interventions: 
  
 
Mentation Interventions: Bed/chair exit alarm, Door open when patient unattended, Evaluate medications/consider consulting pharmacy, Family/sitter at bedside, More frequent rounding, Reorient patient Medication Interventions: Bed/chair exit alarm, Evaluate medications/consider consulting pharmacy Elimination Interventions: Bed/chair exit alarm, Call light in reach, Patient to call for help with toileting needs Problem: Pressure Injury - Risk of 
Goal: *Prevention of pressure injury Document Adam Scale and appropriate interventions in the flowsheet. Outcome: Progressing Towards Goal 
Pressure Injury Interventions: 
Sensory Interventions: Assess changes in LOC, Assess need for specialty bed, Avoid rigorous massage over bony prominences, Check visual cues for pain, Keep linens dry and wrinkle-free, Maintain/enhance activity level, Minimize linen layers, Monitor skin under medical devices, Pad between skin to skin, Pressure redistribution bed/mattress (bed type) Moisture Interventions: Absorbent underpads, Apply protective barrier, creams and emollients, Check for incontinence Q2 hours and as needed, Limit adult briefs, Offer toileting Q_hr Activity Interventions: Increase time out of bed, Pressure redistribution bed/mattress(bed type), PT/OT evaluation Mobility Interventions: HOB 30 degrees or less, Pressure redistribution bed/mattress (bed type), PT/OT evaluation Nutrition Interventions: Document food/fluid/supplement intake Friction and Shear Interventions: Apply protective barrier, creams and emollients, Foam dressings/transparent film/skin sealants, HOB 30 degrees or less, Minimize layers

## 2019-02-07 NOTE — PROGRESS NOTES
Verbal bedside report received from [facility]. Assumed care of patient. 100 [facility] site assessed at bedside with outgoing RN.

## 2019-02-07 NOTE — PROGRESS NOTES
Bedside and Verbal shift change report given to self (oncoming nurse) by Susan Hancock RN (offgoing nurse). Report included the following information SBAR, Kardex, MAR and Recent Results. S/p left subclavian pacer site - pressure dressing in place

## 2019-02-07 NOTE — PROGRESS NOTES
Problem: Pacer/ICD: Post-Procedure Goal: *Hemodynamically stable Outcome: Resolved/Met Date Met: 02/07/19 VSS Goal: *Optimal pain control at patient's stated goal 
Outcome: Resolved/Met Date Met: 02/07/19 Pt states pain is better Goal: *Absence of signs and symptoms of infection or wound complication Outcome: Resolved/Met Date Met: 02/07/19 S/p left subclavian pacer site - no bleeding or hematoma noted

## 2019-02-07 NOTE — PROGRESS NOTES
Bedside and Verbal shift change report given to [address] [place] [address] [place], RN (oncoming nurse) by self (offgoing nurse). Report included the following information SBAR, Kardex, MAR and Recent Results. S/p left subclavian pacer site visualized - dressing c/d/i, no bleeding or hematoma noted

## 2019-02-07 NOTE — PROGRESS NOTES
[facility] CARDIOLOGY PROGRESS NOTE 
      
 
2/7/2019 6:11 AM 
 
Admit Date: 2/4/2019 Admit Diagnosis: Bradycardia [R00.1]; Bradycardia [R00.1] Subjective: No complaints this AM, no chest pain or shortness of breath, appropriate post op device pocket pain Interval History: (History of pertinent interval events obtained from nursing staff) Cardiac device placed yesterday, no events overnight, no immediate complications ROS: 
GEN:  No fever or chills Cardiovascular:  As noted above Pulmonary:  As noted above Neuro:  No new focal motor or sensory loss Objective:  
 
Vitals:  
 02/06/19 2101 02/06/19 2147 02/07/19 0104 02/07/19 6188 BP: (!) 166/93  153/71 140/60 Pulse: 88  88 77 Resp: 19 21 19 Temp: 97.6 °F (36.4 °C)  99.4 °F (37.4 °C) 99.6 °F (37.6 °C) SpO2: 96% 97% 95% 97% Weight:    94.5 kg (208 lb 6.4 oz) Height:      
 
 
Physical Exam: 
[place], Well Nourished, No Acute Distress, Alert & Oriented x 3, appropriate mood. CV- regular rate and rhythm no MRG, left infraclavicular pocket with dressing in place, no evidence of hematoma, redness, warmth or excessive drainage Lung- clear bilaterally Abd- soft, nontender, nondistended Ext- no edema bilaterally. Current Facility-Administered Medications Medication Dose Route Frequency  lidocaine (XYLOCAINE) 10 mg/mL (1 %) injection 0.1 mL  0.1 mL SubCUTAneous PRN  
 lactated Ringers infusion  75 mL/hr IntraVENous CONTINUOUS  
 lactated Ringers infusion  75 mL/hr IntraVENous CONTINUOUS  
 oxyCODONE IR (ROXICODONE) tablet 5 mg  5 mg Oral ONCE PRN  
 oxyCODONE IR (ROXICODONE) tablet 10 mg  10 mg Oral ONCE PRN  
 HYDROmorphone (PF) (DILAUDID) injection 0.5 mg  0.5 mg IntraVENous Multiple  naloxone (NARCAN) injection 0.1 mg  0.1 mg IntraVENous Multiple  flumazenil (ROMAZICON) 0.1 mg/mL injection 0.2 mg  0.2 mg IntraVENous PRN  
  promethazine (PHENERGAN) with saline injection 6.25 mg  6.25 mg IntraVENous Q15MIN PRN  
 diphenhydrAMINE (BENADRYL) injection 12.5 mg  12.5 mg IntraVENous Q15MIN PRN  
 sodium chloride (NS) flush 5-40 mL  5-40 mL IntraVENous Q8H  
 sodium chloride (NS) flush 5-40 mL  5-40 mL IntraVENous PRN  
 ceFAZolin (ANCEF) 2 g/20 mL in sterile water IV syringe  2 g IntraVENous Q8H  
 atorvastatin (LIPITOR) tablet 80 mg  80 mg Oral DAILY  escitalopram oxalate (LEXAPRO) tablet 10 mg  10 mg Oral DAILY  levothyroxine (SYNTHROID) tablet 137 mcg  137 mcg Oral ACB  pantoprazole (PROTONIX) tablet 40 mg  40 mg Oral ACB  sodium chloride (NS) flush 5-40 mL  5-40 mL IntraVENous Q8H  
 sodium chloride (NS) flush 5-40 mL  5-40 mL IntraVENous PRN  
 nitroglycerin (NITROSTAT) tablet 0.4 mg  0.4 mg SubLINGual Q5MIN PRN  
 morphine injection 2 mg  2 mg IntraVENous Q4H PRN  
 acetaminophen (TYLENOL) tablet 650 mg  650 mg Oral Q4H PRN  
 aspirin delayed-release tablet 81 mg  81 mg Oral DAILY  0.9% sodium chloride infusion  75 mL/hr IntraVENous CONTINUOUS Data Review:  
Recent Results (from the past 24 hour(s)) EKG, 12 LEAD, INITIAL Collection Time: 02/06/19  8:05 AM  
Result Value Ref Range Ventricular Rate 32 BPM  
 Atrial Rate 32 BPM  
 P-R Interval 214 ms QRS Duration 182 ms Q-T Interval 610 ms QTC Calculation (Bezet) 445 ms Calculated P Axis 61 degrees Calculated R Axis -67 degrees Calculated T Axis 112 degrees Diagnosis Marked sinus bradycardia with 1st degree A-V block Right bundle branch block Left anterior fascicular block 
!!! Bifascicular block !!! Left ventricular hypertrophy with repolarization abnormality Cannot rule out Septal infarct , age undetermined Abnormal ECG When compared with ECG of 04-FEB-2019 22:31, 
Previous ECG has undetermined rhythm, needs review T wave inversion now evident in Anterior leads QT has shortened Confirmed by TEQUILA CARDENAS (), Mary Alice Anderson ([id number]) on 2/6/2019 12:18:54 PM 
  
EKG, 12 LEAD, INITIAL Collection Time: 02/06/19  8:21 PM  
Result Value Ref Range Ventricular Rate 78 BPM  
 Atrial Rate 78 BPM  
 P-R Interval 166 ms  
 QRS Duration 168 ms Q-T Interval 472 ms QTC Calculation (Bezet) 538 ms Calculated P Axis 72 degrees Calculated R Axis -94 degrees Calculated T Axis 65 degrees Diagnosis Atrial-sensed ventricular-paced rhythm Biventricular pacemaker detected Abnormal ECG When compared with ECG of 06-FEB-2019 08:05, Electronic ventricular pacemaker has replaced Sinus rhythm Vent. rate has increased BY  46 BPM 
  
 
 
EKG:  (EKG has been independently visualized by me with interpretation below) Assessment:  
 
Principal Problem: 
  Bradycardia (2/4/2019) Active Problems: Hypertension (4/16/2013) CAD (coronary artery disease) (4/16/2013) Overview: 1. CABG 2005: LIMA to LAD, SVG to diagonal, SVG to OM and SVG to RCA 2. NSTEMI (12/11/13): Peak troponin 4.66. 
     A. LHC: Severe 3 vessel CAD. Occluded SVG to PDA and diagonal. Stenosis  
    in SVG to OM. B. PCI of SVG to OM: Distal body treated with 4 x 18 mm vision BMS. Prox  
    body lesion treated with integrity 4 x 18 BMS. C. Echo: Normal LV systolic function. EF 55-60%. Mild mitral/tricuspid  
    regurgitation. 3. Echo (9/23/14): EF 50-55%. Mild LAE. Mild mitral regurgitation. Hypothyroidism (4/16/2013) Hyperlipidemia (4/16/2013) Gout (4/16/2013) Paroxysmal atrial fibrillation ([address] (6/18/2014) Overview: 1. Atrial flutter Diagnosed 6/18/14: s/p ablation. 2. Echo (6/19/14): EF 45-50%. Moderate mitral regurgitation. 3. Ecardio (8/12-8/25/14): No atrial arrhythmias. 4.  Admitted with CVA (3/18): Atrial fibrillation noted. Eliquis  
    started. History of CVA (cerebrovascular accident) (6/18/2014) Overview: History of mini-strokes. Plan: 1. Cardiac device implantation: Pt device interrogation this AM reveals normal function, excellent pacing and sensing parameters, CXR without pneumothorax with excellent device position, no recognized complications 2. Afib: will restart home eliquis, renal dosing 3. Acute on CRI: recheck BMP this AM 
4. Dispo: will need social work for assessment of home health needs Gayla Toscano MD 
Cardiology/Electrophysiology

## 2019-02-07 NOTE — DISCHARGE SUMMARY
[address] Cardiology Discharge Summary     Patient ID:  Connee Aase  [id number]  65 y.o.  1935    Admit date: 2/4/2019    Discharge date:  2/7/19    Admitting Physician: Bella Denton MD     Discharge Physician: Eryn Lawrence NP/Dr. Fowler    Admission Diagnoses: Bradycardia [R00.1]  Bradycardia [R00.1]    Discharge Diagnoses:    Diagnosis    Bradycardia    Slow transit constipation    Chronic kidney disease (CKD), stage III (moderate) (HCC)    Paroxysmal atrial fibrillation ([address]    History of CVA (cerebrovascular accident)    Hypertension    CAD (coronary artery disease)    Hypothyroidism    Hyperlipidemia    Gout    GERD (gastroesophageal reflux disease)       Cardiology Procedures this admission:  biventricular PM implantation, CXR  Consults: None    Hospital Course: Patient presented to the ER via EMS with complaints of HR in the 30s. Patient has been bed-bound for approximately 9 months. His wife reported excessive diarrhea for the past several days after taking medications for gout (Colchicine). She reported that she has had to change his brief every 2 hours for the past several days. He is asymptomatic with bradycardia. Denies chest pain, shortness of breath or dizziness. Per patient's wife, she reports that he was difficult to wake up yesterday morning. UPon arrival to the ER, EKG showed high degree AV block with rate in the low 30s. Patient was admitted to telemetry for continued care. Patient was taken to the EP lab and underwent implantation Biotronik biventricular PM by Dr. Bimal Crump. Patient tolerated the procedure well and was taken to the telemetry floor for recovery. Follow up chest xray showed no pneumothorax. On 2/8/19 patients wife reported increased R sided facial droop and slurred speech. CT was ordered and showed chronic appearing changes. Teleneuro was consulted and saw patient on 2/9. They recommended a MRI if pacemaker compatible.  Pacemaker is compatible but d/t new wires, Biotronik recommends waiting 6 weeks. After reassessing situation, symptoms are actually chronic. This morning patient was feeling well without any complaints of chest pain, shortness of breath, or palpitations. Patient's left subclavian cath site was clean, dry and intact without hematoma. Patient's labs were WNL. Patient was seen and examined by Dr. Thais Draper and determined stable and ready for discharge to rehab. Patient was instructed on the importance of medication compliance and outpatient follow up. Patient has been scheduled for follow-up with [facility] -- device clinic on 2/18/19 at 1130 am in the [facility]. DISPOSITION: Patient has been instructed to keep affected arm below shoulder level for the next 4 weeks or until cleared by doctor. The arm sling should be worn while sleeping. The dressing will be removed at follow-up. The incision site must be kept clean and dry. The patient may shower in a few days. Lotions, powders, or creams should be avoided as these can increase the risk of infection. The affected arm should not be used for any pushing, pulling, or lifting until cleared by doctor. Driving is prohibited until cleared by doctor in a follow up appointment. Any signs of infection including increased redness, suspicious drainage, or unexplained fever should be reported to the doctor immediately by calling [phone]. Discharge Exam:  Patient has been seen by Dr. Thais Draper: see his progress note for exam details.   Visit Vitals  /65 (BP 1 Location: Right arm, BP Patient Position: At rest)   Pulse 60   Temp 97.4 °F (36.3 °C)   Resp 16   Ht 6' 1\" (1.854 m)   Wt 92.9 kg (204 lb 14.4 oz)   SpO2 98%   BMI 27.03 kg/m²         Recent Results (from the past 24 hour(s))   PLEASE READ & DOCUMENT PPD TEST IN 72 HRS    Collection Time: 02/10/19  1:05 PM   Result Value Ref Range    PPD negative Negative    mm Induration 0 mm         Patient Instructions: Current Discharge Medication List      START taking these medications    Details   traMADol (ULTRAM) 50 mg tablet Take 1 Tab by mouth every six (6) hours as needed for Pain. Max Daily Amount: 200 mg. Qty: 10 Tab, Refills: 0    Associated Diagnoses: Pacemaker         CONTINUE these medications which have NOT CHANGED    Details   atorvastatin (LIPITOR) 80 mg tablet Take 1 Tab by mouth daily. Qty: 90 Tab, Refills: 3    Associated Diagnoses: Mixed hyperlipidemia      levothyroxine (SYNTHROID) 137 mcg tablet Take  by mouth Daily (before breakfast). !! OTHER Trapeze bar for bed mobility. Qty: 1 Each, Refills: 0    Associated Diagnoses: Bed confinement status      amLODIPine (NORVASC) 5 mg tablet TAKE 1 TABLET BY MOUTH DAILY  Qty: 90 Tab, Refills: 3    Associated Diagnoses: Essential hypertension      escitalopram oxalate (LEXAPRO) 10 mg tablet TAKE 1 TABLET BY MOUTH EVERY MORNING. Qty: 90 Tab, Refills: 3    Associated Diagnoses: Depression, unspecified depression type      hydroCHLOROthiazide (HYDRODIURIL) 25 mg tablet TAKE 1/2 TABLET BY MOUTH DAILY  Qty: 45 Tab, Refills: 3    Associated Diagnoses: Essential hypertension      losartan (COZAAR) 100 mg tablet TAKE 1 TABLET BY MOUTH DAILY  Qty: 90 Tab, Refills: 3    Associated Diagnoses: Essential hypertension      pantoprazole (PROTONIX) 40 mg tablet TAKE 1 TABLET BY MOUTH DAILY. Qty: 90 Tab, Refills: 3    Associated Diagnoses: Gastroesophageal reflux disease, esophagitis presence not specified      aspirin delayed-release 81 mg tablet Take 81 mg by mouth.      cyanocobalamin (VITAMIN B-12) 1,000 mcg/mL injection 1 ml injected intramuscularly once monthly  Qty: 10 mL, Refills: 0    Associated Diagnoses: B12 deficiency      !! OTHER Wedges for propping in bed. Qty: 2 Each, Refills: 0    Associated Diagnoses: History of CVA (cerebrovascular accident); Bed confinement status      ELIQUIS 2.5 mg tablet Take 1 Tab by mouth two (2) times a day.   Qty: 60 Tab, Refills: 11      nitroglycerin (NITROSTAT) 0.4 mg SL tablet 1 Tab by SubLINGual route every five (5) minutes as needed for Chest Pain. Qty: 1 Bottle, Refills: 3      colchicine (MITIGARE) 0.6 mg capsule Take 0.6 mg by mouth as needed. !! - Potential duplicate medications found. Please discuss with provider.           Signed:  Robbie Armstrong NP  2/11/2019 12:05 AM

## 2019-02-07 NOTE — PROGRESS NOTES
Bedside and Verbal shift change report given to ALDAIR Tyler (oncoming nurse) by self (offgoing nurse). Report included the following information SBAR, Kardex, Intake/Output, MAR, Recent Results and Med Rec Status. [facility] site assessed at bedside.

## 2019-02-07 NOTE — PROGRESS NOTES
Verbal bedside report received from [place], [facility]. Assumed care of patient.

## 2019-02-07 NOTE — PROGRESS NOTES
Medical alert bracelet found on patient's Left wrist. Medical alert bracelet returned to patient's wife.

## 2019-02-07 NOTE — PROGRESS NOTES
C-Diff testing has been ordered but the test has not been completed. It did not meet testing criteria because: · The patient has not had 3 or more liquid bowel movements in the last 24 hours.

## 2019-02-07 NOTE — PROGRESS NOTES
Patient's wife states,\"Mr. Cleveland Roberts is been missing his medical alert bracelet after his procedure yesterday\". EP lab was called regarding missing medical alert bracelet. EP states,\" patient had his bracelet when he left EP lab yesterday 2/6/2019\". EP reports patient went to holding after procedure. Holding was called and she states, \"She checked with two different staff members from yesterday and they do not recall anything being lost/misplaced\". Charge nurse,Lupe updated about missing medical alert bracelet and patient's wife been updated. Will continue to monitor.

## 2019-02-08 ENCOUNTER — APPOINTMENT (OUTPATIENT)
Dept: CT IMAGING | Age: 84
DRG: 243 | End: 2019-02-08
Attending: NURSE PRACTITIONER
Payer: MEDICARE

## 2019-02-08 LAB
MM INDURATION POC: 0 MM (ref 0–5)
PPD POC: NEGATIVE NEGATIVE

## 2019-02-08 PROCEDURE — 92610 EVALUATE SWALLOWING FUNCTION: CPT

## 2019-02-08 PROCEDURE — 65660000000 HC RM CCU STEPDOWN

## 2019-02-08 PROCEDURE — 74011250637 HC RX REV CODE- 250/637: Performed by: INTERNAL MEDICINE

## 2019-02-08 PROCEDURE — 74011250637 HC RX REV CODE- 250/637: Performed by: NURSE PRACTITIONER

## 2019-02-08 PROCEDURE — 97530 THERAPEUTIC ACTIVITIES: CPT

## 2019-02-08 PROCEDURE — 77030019605

## 2019-02-08 PROCEDURE — 97162 PT EVAL MOD COMPLEX 30 MIN: CPT

## 2019-02-08 PROCEDURE — 70450 CT HEAD/BRAIN W/O DYE: CPT

## 2019-02-08 RX ADMIN — Medication 10 ML: at 05:40

## 2019-02-08 RX ADMIN — Medication 10 ML: at 05:41

## 2019-02-08 RX ADMIN — ACETAMINOPHEN 650 MG: 325 TABLET, FILM COATED ORAL at 18:09

## 2019-02-08 RX ADMIN — LEVOTHYROXINE SODIUM 137 MCG: 50 TABLET ORAL at 11:45

## 2019-02-08 RX ADMIN — ASPIRIN 81 MG: 81 TABLET, COATED ORAL at 11:44

## 2019-02-08 RX ADMIN — ESCITALOPRAM OXALATE 10 MG: 10 TABLET ORAL at 11:44

## 2019-02-08 RX ADMIN — ATORVASTATIN CALCIUM 80 MG: 40 TABLET, FILM COATED ORAL at 11:44

## 2019-02-08 RX ADMIN — APIXABAN 2.5 MG: 2.5 TABLET, FILM COATED ORAL at 11:43

## 2019-02-08 RX ADMIN — PANTOPRAZOLE SODIUM 40 MG: 40 TABLET, DELAYED RELEASE ORAL at 11:45

## 2019-02-08 RX ADMIN — APIXABAN 2.5 MG: 2.5 TABLET, FILM COATED ORAL at 21:45

## 2019-02-08 RX ADMIN — Medication 10 ML: at 16:02

## 2019-02-08 RX ADMIN — Medication 10 ML: at 21:45

## 2019-02-08 NOTE — PROGRESS NOTES
Order received, chart reviewed. Pt off floor for CT, will follow up for evaluation as schedule allows.  
 
Giorgio Loco, OT

## 2019-02-08 NOTE — PROGRESS NOTES
[facility] CARDIOLOGY PROGRESS NOTE 
      
 
2/8/2019 12:37 PM 
 
Admit Date: 2/4/2019 Admit Diagnosis: Bradycardia [R00.1]; Bradycardia [R00.1] Assessment:  
Principal Problem: 
  Bradycardia (2/4/2019) Active Problems: Hypertension (4/16/2013) CAD (coronary artery disease) (4/16/2013) Overview: 1. CABG 2005: LIMA to LAD, SVG to diagonal, SVG to OM and SVG to RCA 2. NSTEMI (12/11/13): Peak troponin 4.66. 
     A. LHC: Severe 3 vessel CAD. Occluded SVG to PDA and diagonal. Stenosis  
    in SVG to OM. B. PCI of SVG to OM: Distal body treated with 4 x 18 mm vision BMS. Prox  
    body lesion treated with integrity 4 x 18 BMS. C. Echo: Normal LV systolic function. EF 55-60%. Mild mitral/tricuspid  
    regurgitation. 3. Echo (9/23/14): EF 50-55%. Mild LAE. Mild mitral regurgitation. Hypothyroidism (4/16/2013) Hyperlipidemia (4/16/2013) Gout (4/16/2013) Paroxysmal atrial fibrillation ([facility] [address] (6/18/2014) Overview: 1. Atrial flutter Diagnosed 6/18/14: s/p ablation. 2. Echo (6/19/14): EF 45-50%. Moderate mitral regurgitation. 3. Ecardio (8/12-8/25/14): No atrial arrhythmias. 4.  Admitted with CVA (3/18): Atrial fibrillation noted. Eliquis  
    started. History of CVA (cerebrovascular accident) (6/18/2014) Overview: History of mini-strokes. Plan: 1. Heart block and mild systolic heart failure - s/p CRT-P with optimal performance. 2. Deconditioning/right sided weakness - negative head CT today. Lethargy likely from anesthesia effect post procedure. Appreciate neuro recs. 3. Dispo: Awaiting long term placement. Thank you for allowing me to participate in the electrophysiologic care of this most pleasant patient. Please feel free to contact me if there are any questions or concerns. Ty Engel. Mishel Araiza MD, MS Clinical Cardiac Electrophysiology [facility] Cardiology Subjective: Ongoing acute on chronic weakness. Pacemaker working well. Interval History: (History of pertinent interval events obtained from nursing staff) ROS: 
GEN:  No fever or chills Cardiovascular:  As noted above Pulmonary:  As noted above Neuro:  No new focal motor or sensory loss Objective:  
 
Vitals:  
 02/07/19 2144 02/08/19 0037 02/08/19 0602 02/08/19 1081 BP: 114/66 156/70 148/62 168/69 Pulse: 69 67 76 61 Resp: 17 17 17 16 Temp: 98.3 °F (36.8 °C) 98.1 °F (36.7 °C) 98.3 °F (36.8 °C) 98 °F (36.7 °C) SpO2: 99% 100% 99% 96% Weight:   206 lb 8 oz (93.7 kg) Height:      
 
 
Physical Exam: 
Trung Console, Well Nourished, No Acute Distress, Alert & Oriented x 3, appropriate mood. Neck- supple, no JVD 
CV- regular rate and rhythm no MRG, left sided CIED C/D/I. Lung- clear bilaterally Abd- soft, nontender, nondistended Ext- no edema bilaterally. Skin- warm and dry Current Facility-Administered Medications Medication Dose Route Frequency  apixaban (ELIQUIS) tablet 2.5 mg  2.5 mg Oral Q12H  
 guaiFENesin (ROBITUSSIN) 100 mg/5 mL oral liquid 100 mg  100 mg Oral Q6H PRN  
 tuberculin injection 5 Units  5 Units IntraDERMal ONCE  
 lidocaine (XYLOCAINE) 10 mg/mL (1 %) injection 0.1 mL  0.1 mL SubCUTAneous PRN  
 lactated Ringers infusion  75 mL/hr IntraVENous CONTINUOUS  
 HYDROmorphone (PF) (DILAUDID) injection 0.5 mg  0.5 mg IntraVENous Multiple  naloxone (NARCAN) injection 0.1 mg  0.1 mg IntraVENous Multiple  flumazenil (ROMAZICON) 0.1 mg/mL injection 0.2 mg  0.2 mg IntraVENous PRN  promethazine (PHENERGAN) with saline injection 6.25 mg  6.25 mg IntraVENous Q15MIN PRN  
 diphenhydrAMINE (BENADRYL) injection 12.5 mg  12.5 mg IntraVENous Q15MIN PRN  
 sodium chloride (NS) flush 5-40 mL  5-40 mL IntraVENous Q8H  
 sodium chloride (NS) flush 5-40 mL  5-40 mL IntraVENous PRN  
 atorvastatin (LIPITOR) tablet 80 mg  80 mg Oral DAILY  escitalopram oxalate (LEXAPRO) tablet 10 mg  10 mg Oral DAILY  levothyroxine (SYNTHROID) tablet 137 mcg  137 mcg Oral ACB  pantoprazole (PROTONIX) tablet 40 mg  40 mg Oral ACB  sodium chloride (NS) flush 5-40 mL  5-40 mL IntraVENous Q8H  
 sodium chloride (NS) flush 5-40 mL  5-40 mL IntraVENous PRN  
 nitroglycerin (NITROSTAT) tablet 0.4 mg  0.4 mg SubLINGual Q5MIN PRN  
 morphine injection 2 mg  2 mg IntraVENous Q4H PRN  
 acetaminophen (TYLENOL) tablet 650 mg  650 mg Oral Q4H PRN  
 aspirin delayed-release tablet 81 mg  81 mg Oral DAILY  0.9% sodium chloride infusion  75 mL/hr IntraVENous CONTINUOUS Data Review: No results found for this or any previous visit (from the past 24 hour(s)). EKG:  (EKG has been independently visualized by me with interpretation below): Atrial sensed Ventricular paced. Biventricular pacing.

## 2019-02-08 NOTE — PROGRESS NOTES
Verbal bedside report given to Bobbi Ferrari oncoming RN. Patient's situation, background, assessment and recommendations provided. Opportunity for questions provided. Oncoming RN assumed care of patient.

## 2019-02-08 NOTE — PROGRESS NOTES
LTG: Patient will tolerate least restrictive diet without overt signs or symptoms of airway compromise. STG: Patient will tolerate mechanical soft diet and thin liquids without overt signs or symptoms of airway compromise. STG: Patient will participate in assessment of speech and language abilities. Speech language pathology: bedside swallow note: Initial Assessment NAME/AGE/GENDER: Earnest Sorenson is a 80 y.o. male DATE: 2/8/2019 PRIMARY DIAGNOSIS: Bradycardia [R00.1] Bradycardia [R00.1] Procedure(s) (LRB): 
PACEMAKER INSERTION (N/A) 2 Days Post-Op ICD-10: Treatment Diagnosis: R13.12 Oropharyngeal Dysphagia. INTERDISCIPLINARY COLLABORATION: Registered Nurse PRECAUTIONS/ALLERGIES: Other medication ASSESSMENT:Based on the objective data described below, Mr. Yissel Wright presents with mild oropharyngeal dysphagia. History of CVA in 2016 with frequent TIAs per wife's report. Right facial droop noted on oral motor assessment, which she states is baseline. She states he frequently has \"spells\" with decline in speech and language abilities that last for 1-2 days. She also endorses \"choking\" on foods with persistent coughing that lasts for several hours. Per RN report, she endorses noted slurred speech over last 2 days and choking on eggs yesterday, but has not mentioned to nursing staff until today. Patient presented with thin liquid via tsp, cup, and straw; puree; mechanical soft; and solid textures. Prolonged mastication with chewable textures and piecemeal swallow. Adequate oral clearing after second swallow. Immediate cough on first tsp sip presented by clinician. Suspect this is related to coordination between patient and clinician. No cough, throat clear, or change in vocal quality for remaining 10 ounces of po intake via serial straw sips. Recommend mechanical soft diet to improve safety due to reports of choking and mild oral dysphagia. Thin liquids ok.   ? If choking is related to esophageal dysphagia. Patient will  benefit from skilled intervention to address mild dysphagia and speech/langauge assessment. Education provided to patient and wife regarding s/s of CVA and need to notify medical personnel if acute changes are noted. ?????? ? ? This section established at most recent assessment?????????? 
PROBLEM LIST (Impairments causing functional limitations): 1. Oropharyngeal dysphagia REHABILITATION POTENTIAL FOR STATED GOALS: Good PLAN OF CARE:  
Patient will benefit from skilled intervention to address the following impairments. RECOMMENDATIONS AND PLANNED INTERVENTIONS (Benefits and precautions of therapy have been discussed with the patient.): 
· PO:  Mechanical soft · Liquids:  regular thin MEDICATIONS: 
· With liquid ASPIRATION PRECAUTIONS: 
· Slow rate of intake · Small bites/sips · Upright at 90 degrees during meal 
COMPENSATORY STRATEGIES/MODIFICATIONS INCLUDING: 
· Small sips and bites OTHER RECOMMENDATIONS (including follow up treatment recommendations): · Family training/education · Patient education RECOMMENDED DIET MODIFICATIONS DISCUSSED WITH: 
· Nursing · Family · Patient FREQUENCY/DURATION: Continue to follow patient 3 times a week for duration of hospital stay to address above goals. RECOMMENDED REHABILITATION/EQUIPMENT: (at time of discharge pending progress): Due to the probability of continued deficits (see above) this patient will likely need continued skilled speech therapy after discharge. SUBJECTIVE:  
Alert, wife at bedside.   
History of Present Injury/Illness: Mr. Art Joshi  has a past medical history of Arthritis, Asthma, Atrial flutter with rapid ventricular response ([facility] [address] (6/18/2014), Basal cell carcinoma of skin of other and unspecified parts of face (2/20/2014), Benign hypertensive heart disease without heart failure (9/29/2015), BPH (benign prostatic hyperplasia) (4/16/2013), CAD (coronary artery disease), Calculus of kidney (2/20/2014), Cancer [facility] Carotid artery stenosis with cerebral infarction ([facility] [address] (9/29/2015), Chest pain, unspecified (12/11/2013), CVA (cerebral vascular accident) ([facility] [address] (4/16/2013), Diarrhea following gastrointestinal surgery (4/16/2013), Diverticulitis (colectomy), Dizziness (6/18/2014), Generalized weakness, GERD (gastroesophageal reflux disease), Gout (4/16/2013), Heart disease, unspecified (2/20/2014), History of basal cell cancer (nose), History of CVA (cerebrovascular accident) (6/18/2014), History of kidney stones (2006), Hyperlipidemia (4/16/2013), Hypertension, Hypertrophy of prostate with urinary obstruction and other lower urinary tract symptoms (LUTS) (2/20/2014), Hypothyroidism (4/16/2013), Incomplete bladder emptying (2/20/2014), Iron deficiency anemia (4/16/2013), NSTEMI (non-ST elevated myocardial infarction) ([facility] [address] (12/12/2013), Osteoarthritis (4/16/2013), Platelet inhibition due to Plavix, Pure hypercholesterolemia (2/20/2014), [facility] Unspecified adverse effect of anesthesia, Unspecified asthma (2/20/2014), Unspecified disorder of thyroid (2/20/2014), Urinary frequency (2/20/2014), and Urinary tract infection, site not specified (11/20/2013). He also has no past medical history of Difficult intubation. Flavio Perez He also  has a past surgical history that includes hx cholecystectomy; hx small bowel resection; pr removal of kidney stone (05/2016); hx carotid endarterectomy (Right, 2011); hx appendectomy (age 15); hx cataract removal (Bilateral, 2006); hx tonsillectomy; hx adenoidectomy; pr cabg, artery-vein, four (2005); pr left heart cath,percutaneous (12/12/2013); hx other surgical (3/12/14); hx coronary stent placement; pr total knee arthroplasty (2007); and pr total knee arthroplasty (2012). Present Symptoms: oropharyngeal dysphagia Pain Scale 1: Numeric (0 - 10) Pain Intensity 1: 0 Current Medications: No current facility-administered medications on file prior to encounter. Current Outpatient Medications on File Prior to Encounter Medication Sig Dispense Refill  atorvastatin (LIPITOR) 80 mg tablet Take 1 Tab by mouth daily. 90 Tab 3  
 levothyroxine (SYNTHROID) 137 mcg tablet Take  by mouth Daily (before breakfast).  OTHER Trapeze bar for bed mobility. 1 Each 0  
 amLODIPine (NORVASC) 5 mg tablet TAKE 1 TABLET BY MOUTH DAILY 90 Tab 3  
 escitalopram oxalate (LEXAPRO) 10 mg tablet TAKE 1 TABLET BY MOUTH EVERY MORNING. 90 Tab 3  
 hydroCHLOROthiazide (HYDRODIURIL) 25 mg tablet TAKE 1/2 TABLET BY MOUTH DAILY 45 Tab 3  
 losartan (COZAAR) 100 mg tablet TAKE 1 TABLET BY MOUTH DAILY 90 Tab 3  pantoprazole (PROTONIX) 40 mg tablet TAKE 1 TABLET BY MOUTH DAILY. 90 Tab 3  
 aspirin delayed-release 81 mg tablet Take 81 mg by mouth.  cyanocobalamin (VITAMIN B-12) 1,000 mcg/mL injection 1 ml injected intramuscularly once monthly 10 mL 0  
 OTHER Wedges for propping in bed. 2 Each 0  
 ELIQUIS 2.5 mg tablet Take 1 Tab by mouth two (2) times a day. 60 Tab 11  
 nitroglycerin (NITROSTAT) 0.4 mg SL tablet 1 Tab by SubLINGual route every five (5) minutes as needed for Chest Pain. 1 Bottle 3  
 colchicine (MITIGARE) 0.6 mg capsule Take 0.6 mg by mouth as needed. Current Dietary Status: NPO Social History/Home Situation:   
Home Environment: Private residence # Steps to Enter: 0 One/Two Story Residence: Two story, live on 1st floor Living Alone: No 
Support Systems: Spouse/Significant Other/Partner Patient Expects to be Discharged to[de-identified] Rehabilitation facility Current DME Used/Available at Home: Commode, bedside, Wheelchair, [address] bed OBJECTIVE:  
Respiratory Status:  Nasal cannula  3 l/min Oral Motor Structure/Speech:  Oral-Motor Structure/Motor Speech Labial: Decreased rate, Right droop Dentition: Upper & lower dentures Lingual: Decreased rate, Decreased strength Cognitive and Communication Status: 
Neurologic State: Alert Orientation Level: Oriented to person;Disoriented to situation;Disoriented to time;Disoriented to place Cognition: Follows commands Perception: Appears intact Perseveration: No perseveration noted Safety/Judgement: Decreased awareness of environment;Decreased insight into deficits BEDSIDE SWALLOW EVALUATIONOral Assessment: 
Oral Assessment Labial: Decreased rate;Right droop Dentition: Upper & lower dentures Lingual: Decreased rate;Decreased strength P.O. Trials: 
Patient Position: Upright in bed The patient was given tsp-small bite amounts of the following:  
Consistency Presented: Thin liquid; Solid;Puree;Mixed consistency How Presented: SLP-fed/presented;Cup/sip;Spoon;Straw;Successive swallows ORAL PHASE: 
Bolus Acceptance: No impairment Bolus Formation/Control: Impaired Propulsion: No impairment Type of Impairment: Mastication Oral Residue: None PHARYNGEAL PHASE: 
Initiation of Swallow: No impairment Laryngeal Elevation: Functional 
Aspiration Signs/Symptoms: None Vocal Quality: No impairment Effective Modifications: None Pharyngeal Phase Characteristics: No impairment, issues, or problems OTHER OBSERVATIONS: 
Rate/bite size: WNL Endurance:  Questionable Comments: Tool Used: Dysphagia Outcome and Severity Scale (CHEL) Score Comments Normal Diet  [] 7 With no strategies or extra time needed Functional Swallow  [] 6 May have mild oral or pharyngeal delay Mild Dysphagia [x] 5 Which may require one diet consistency restricted (those who demonstrate penetration which is entirely cleared on MBS would be included) Mild-Moderate Dysphagia  [] 4 With 1-2 diet consistencies restricted Moderate Dysphagia  [] 3 With 2 or more diet consistencies restricted Moderately Severe Dysphagia  [] 2 With partial PO strategies (trials with ST only) Severe Dysphagia  [] 1 With inability to tolerate any PO safely Score:  Initial: 5 Most Recent: X (Date: --) Interpretation of Tool: The Dysphagia Outcome and Severity Scale (CHEL) is a simple, easy-to-use, 7-point scale developed to systematically rate the functional severity of dysphagia based on objective assessment and make recommendations for diet level, independence level, and type of nutrition. [place] / [place] / Product Type: Medicare /  
 
TREATMENT:  
 (In addition to Assessment/Re-Assessment sessions the following treatments were rendered) Assessment/Reassessment only, no treatment provided today MODALITIES:  
  
  
  
  
  
  
  
  
  
  
    
  
  
  
  
  
  
  
  
   
 
ORAL MOTOR  EXERCISES: 
  
  
  
  
  
  
  
  
  
  
  
  
  
  
  
  
  
  
  
  
  
  
  
  
  
  
    
  
  
  
  
  
  
  
  
  
  
  
  
  
  
  
  
  
  
  
  
  
  
  
  
  
   
 
LARYNGEAL / PHARYNGEAL EXERCISES: 
  
  
  
  
  
  
  
  
  
  
  
  
  
  
  
  
  
  
  
  
  
    
  
  
  
  
  
  
  
  
  
  
  
  
  
  
  
  
  
  
  
   
 
__________________________________________________________________________________________________ Safety: After treatment position/precautions: 
· RN notified · Family at bedside · Upright in Bed. Progression/Medical Necessity:  
· Patient is expected to demonstrate progress in swallow function, diet tolerance and swallow safety to improve swallow safety, work toward diet advancement and decrease aspiration risk. Compliance with Program/Exercises: Will assess as treatment progresses Reason for Continuation of Services/Other Comments: 
· Patient continues to require skilled intervention due to dysphagia. Recommendations/Intent for next treatment session: \"Treatment next visit will focus on diet tolerance, speech/language assessment\". \ 
Total Treatment Duration: 
Time In: 1027 Time Out: 1045 PREM [facility] DIANE ROLDAN, CCC-SLP

## 2019-02-08 NOTE — PROGRESS NOTES
CM met with pt & spouse to discuss bed offers-  Accepted offer from [place]. Notified [facility] at [place]. CM to continue to monitor for dc needs. Care Management Interventions PCP Verified by CM: Yes(last week) Palliative Care Criteria Met (RRAT>21 & CHF Dx)?: No(RRAT 20 Dx Bradycardia) Transition of Care Consult (CM Consult): Discharge Planning, SNF [facility] - INPATIENT: No 
Reason Outside [place]: Patient already serviced by other home care/hospice agency(Interim Sarai Munoz RN/PT) Discharge Durable Medical Equipment: No([facility], Wheelchair, Chair lift. hospital bed) Physical Therapy Consult: No 
Occupational Therapy Consult: No 
Speech Therapy Consult: No 
Current Support Network: Lives with Spouse Confirm Follow Up Transport: Family Plan discussed with Pt/Family/Caregiver: Yes Freedom of Choice Offered: Yes Discharge Location Discharge Placement: Rehab Unit Subacute

## 2019-02-08 NOTE — PROGRESS NOTES
Bedside and Verbal shift change report given to self (oncoming nurse) by Rola Vail RN (offgoing nurse). Report included the following information SBAR, Kardex and MAR.

## 2019-02-08 NOTE — PROGRESS NOTES
Problem: Falls - Risk of 
Goal: *Absence of Falls Document [facility] Fall Risk and appropriate interventions in the flowsheet. Outcome: Progressing Towards Goal 
Fall Risk Interventions: 
  
 
Mentation Interventions: Adequate sleep, hydration, pain control, Door open when patient unattended, Bed/chair exit alarm, Evaluate medications/consider consulting pharmacy, Familiar objects from home, Eyeglasses and hearing aids, Family/sitter at bedside, Gait belt with transfers/ambulation, HELP ([address]) if available, Increase mobility, More frequent rounding, Reorient patient, Room close to nurse's station, Self-releasing belt, Toileting rounds, Update white board Medication Interventions: Assess postural VS orthostatic hypotension, Bed/chair exit alarm, Evaluate medications/consider consulting pharmacy, Patient to call before getting OOB, Teach patient to arise slowly, Utilize gait belt for transfers/ambulation Elimination Interventions: Bed/chair exit alarm, Call light in reach, Elevated toilet seat, Patient to call for help with toileting needs, Toilet paper/wipes in reach, Toileting schedule/hourly rounds

## 2019-02-08 NOTE — PROGRESS NOTES
Problem: Mobility Impaired (Adult and Pediatric) Goal: *Acute Goals and Plan of Care (Insert Text) STG: 
(1.)Mr. Henrietta Cerrato will move from supine to sit and sit to supine  with MAXIMAL ASSIST  within 3 treatment day(s). (2.)Mr. Henrietta Cerrato will transfer from bed to chair and chair to bed with MAXIMAL ASSIST  using the least restrictive device within 3 treatment day(s). (3.)Mr. Henrietta Cerrato will perform STS with MAXIMAL ASSIST  and tolerate standing for >3 minutes within 3 treatment days. LTG: 
(1.)Mr. Henrietta Cerrato will move from supine to sit and sit to supine  in bed with MODERATE ASSIST  within 7 treatment day(s). (2.)Mr. Henrietta Cerraot will transfer from bed to chair and chair to bed with MODERATE ASSIST  using the least restrictive device within 7 treatment day(s). (3.)Mr. Henrietta Cerrato will perform STS with MODERATE ASSIST and tolerate standing for >5 minutes within 7 treatment days 
________________________________________________________________________________________________ PHYSICAL THERAPY: Initial Assessment and AM 2/8/2019INPATIENT: PT Visit Days : 1 [place] / [place] B / Product Type: Medicare /   
  
NAME/AGE/GENDER: Sergey Martines is a 80 y.o. male PRIMARY DIAGNOSIS: Bradycardia [R00.1] Bradycardia [R00.1] Bradycardia Bradycardia Procedure(s) (LRB): 
PACEMAKER INSERTION (N/A) 2 Days Post-Op ICD-10: Treatment Diagnosis:  
 · Generalized Muscle Weakness (M62.81) · Other abnormalities of gait and mobility (R26.89) Precaution/Allergies: Other medication NWB L UE 
  
ASSESSMENT:  
Mr. Henrietta Cerrato is supine in bed upon contact with wife at bedside. Pt is 2 days post op pacemaker placement with sling donned. Pt is oriented to person only this session. Pt lives with wife in 2 story home with all needs on 1st floor. Pt has been non-ambulatory for approximately 9 months per wife.  [facility] [address] [facility] 4 weeks and has been able to perform STS with assist and transfer to wheelchair with assist. Wife requires use of standing lift for transfer. Pt currently on 3L O2 NC but does not require supplemental O2 at baseline. Pt transitioned supine to sit EOB requiring total A and max cues for technique. Pt is extremely rigid with attempted transfer. Pt demonstrating poor static sitting balance with strong posterior lean. Pt requiring total A for return to supine in bed. Pt performed exercises requiring constant VC and TC for technique. Pt left supine in bed with all needs met and within reach. Would benefit from second assist next session. Bhavesh Camacho will benefit from skilled PT (medically necessary) to address decreased strength, decreased balance, decreased functional tolerance, decreased cardiopulmonary endurance affecting participation in basic ADLs and functional tasks. This section established at most recent assessment PROBLEM LIST (Impairments causing functional limitations): 1. Decreased Strength 2. Decreased ADL/Functional Activities 3. Decreased Transfer Abilities 4. Decreased Balance 5. Decreased Activity Tolerance 6. Decreased Pacing Skills 7. Increased Fatigue 8. Increased Shortness of Breath 9. Decreased Flexibility/Joint Mobility 10. Decreased Cognition INTERVENTIONS PLANNED: (Benefits and precautions of physical therapy have been discussed with the patient.) 1. Balance Exercise 2. Bed Mobility 3. Family Education 4. Home Exercise Program (HEP) 5. Neuromuscular Re-education/Strengthening 6. Range of Motion (ROM) 7. Therapeutic Activites 8. Therapeutic Exercise/Strengthening 9. Transfer Training TREATMENT PLAN: Frequency/Duration: 3 times a week for duration of hospital stay Rehabilitation Potential For Stated Goals: Fair RECOMMENDED REHABILITATION/EQUIPMENT: (at time of discharge pending progress): Due to the probability of continued deficits (see above) this patient will likely need continued skilled physical therapy after discharge. Equipment:  
? None at this time HISTORY:  
History of Present Injury/Illness (Reason for Referral): S/p pacemaker Past Medical History/Comorbidities:  
Mr. Debra Henriquez  has a past medical history of Arthritis, Asthma, Atrial flutter with rapid ventricular response ([facility] [address] (6/18/2014), Basal cell carcinoma of skin of other and unspecified parts of face (2/20/2014), Benign hypertensive heart disease without heart failure (9/29/2015), BPH (benign prostatic hyperplasia) (4/16/2013), CAD (coronary artery disease), Calculus of kidney (2/20/2014), Cancer ([address] Carotid artery stenosis with cerebral infarction ([address] (9/29/2015), Chest pain, unspecified (12/11/2013), CVA (cerebral vascular accident) ([address] (4/16/2013), Diarrhea following gastrointestinal surgery (4/16/2013), Diverticulitis (colectomy), Dizziness (6/18/2014), Generalized weakness, GERD (gastroesophageal reflux disease), Gout (4/16/2013), Heart disease, unspecified (2/20/2014), History of basal cell cancer (nose), History of CVA (cerebrovascular accident) (6/18/2014), History of kidney stones (2006), Hyperlipidemia (4/16/2013), Hypertension, Hypertrophy of prostate with urinary obstruction and other lower urinary tract symptoms (LUTS) (2/20/2014), Hypothyroidism (4/16/2013), Incomplete bladder emptying (2/20/2014), Iron deficiency anemia (4/16/2013), NSTEMI (non-ST elevated myocardial infarction) ([address] (12/12/2013), Osteoarthritis (4/16/2013), Platelet inhibition due to Plavix, Pure hypercholesterolemia (2/20/2014), Stroke ([address] Unspecified adverse effect of anesthesia, Unspecified asthma (2/20/2014), Unspecified disorder of thyroid (2/20/2014), Urinary frequency (2/20/2014), and Urinary tract infection, site not specified (11/20/2013). He also has no past medical history of Difficult intubation.   Mr. Debra Henriquez  has a past surgical history that includes hx cholecystectomy; hx small bowel resection; pr removal of kidney stone (05/2016); hx carotid endarterectomy (Right, 2011); hx appendectomy (age 15); hx cataract removal (Bilateral, 2006); hx tonsillectomy; hx adenoidectomy; pr cabg, artery-vein, four (2005); pr left heart cath,percutaneous (12/12/2013); hx other surgical (3/12/14); hx coronary stent placement; pr total knee arthroplasty (2007); and pr total knee arthroplasty (2012). Social History/Living Environment:  
Home Environment: Private residence # Steps to Enter: 0 One/Two Story Residence: Two story, live on 1st floor Living Alone: No 
Support Systems: Spouse/Significant Other/Partner Patient Expects to be Discharged to[de-identified] Rehabilitation facility Current DME Used/Available at Home: Commode, bedside, Wheelchair, [address] bed Prior Level of Function/Work/Activity: 
Assist with transfers and ADLS Number of Personal Factors/Comorbidities that affect the Plan of Care: 3+: HIGH COMPLEXITY EXAMINATION:  
Most Recent Physical Functioning:  
Gross Assessment: 
AROM: Generally decreased, functional 
Strength: Grossly decreased, non-functional 
Coordination: Grossly decreased, non-functional 
         
  
Posture: 
  
Balance: 
Sitting: Impaired; With support Bed Mobility: 
Rolling: Total assistance Supine to Sit: Total assistance Sit to Supine: Total assistance Scooting: Total assistance Wheelchair Mobility: 
  
Transfers: 
  
Gait: 
  
   
  
Body Structures Involved: 1. Nerves 2. Heart 3. Lungs 4. Bones 5. Muscles Body Functions Affected: 1. Mental 
2. Cardio 3. Respiratory 4. Neuromusculoskeletal 
5. Movement Related Activities and Participation Affected: 1. General Tasks and Demands 2. Mobility 3. Self Care 4. Domestic Life 5. Interpersonal Interactions and Relationships 6. Community, Social and [place] Number of elements that affect the Plan of Care: 4+: HIGH COMPLEXITY CLINICAL PRESENTATION:  
 Presentation: Evolving clinical presentation with changing clinical characteristics: MODERATE COMPLEXITY CLINICAL DECISION MAKING:  
[facility] [place] [place] [employer] AM-PAC 6 Clicks Basic Mobility Inpatient Short Form How much difficulty does the patient currently have. .. Unable A Lot A Little None 1. Turning over in bed (including adjusting bedclothes, sheets and blankets)? [] 1   [x] 2   [] 3   [] 4  
2. Sitting down on and standing up from a chair with arms ( e.g., wheelchair, bedside commode, etc.)   [x] 1   [] 2   [] 3   [] 4  
3. Moving from lying on back to sitting on the side of the bed? [] 1   [x] 2   [] 3   [] 4 How much help from another person does the patient currently need. .. Total A Lot A Little None 4. Moving to and from a bed to a chair (including a wheelchair)? [x] 1   [] 2   [] 3   [] 4  
5. Need to walk in hospital room? [x] 1   [] 2   [] 3   [] 4  
6. Climbing 3-5 steps with a railing? [x] 1   [] 2   [] 3   [] 4  
© 2007, Trustees of [facility] [place] [place] [employer], under license to [employer]. All rights reserved Score:  Initial: 8 Most Recent: X (Date: -- ) Interpretation of Tool:  Represents activities that are increasingly more difficult (i.e. Bed mobility, Transfers, Gait). Medical Necessity:    
· Patient is expected to demonstrate progress in strength, range of motion, balance, coordination and functional technique to decrease assistance required with transfers and tolerance for functional mobility. Socrates Cleveland Reason for Services/Other Comments: 
· Patient continues to require skilled intervention due to  decreased strength, decreased balance, decreased functional tolerance, decreased cardiopulmonary endurance affecting participation in basic ADLs and functional tasks.   
Use of outcome tool(s) and clinical judgement create a POC that gives a: Questionable prediction of patient's progress: MODERATE COMPLEXITY  
  
 
 
 
TREATMENT:  
 (In addition to Assessment/Re-Assessment sessions the following treatments were rendered) Pre-treatment Symptoms/Complaints:  Confused Pain: Initial:  
Pain Intensity 1: 0  Post Session:  0/10, visual  
In addition to evaluation: 
Therapeutic Activity: (    10 minutes): Therapeutic activities including Bed transfers and cues for ease and safety of transfers, exercises supine in bed to improve mobility, strength, balance and coordination. Required total   to promote static balance in sitting and promote coordination of bilateral, upper extremity(s), lower extremity(s). Date: 
2/8/19 Date: 
 Date: Activity/Exercise Parameters Parameters Parameters Ankle pumps 10 X B AA Heel slides 10 X B AA Hip abduction 10 X B AA Braces/Orthotics/Lines/Etc:  
· O2 Device: Nasal cannula Treatment/Session Assessment:   
· Response to Treatment:  Total A for bed mobility · Interdisciplinary Collaboration:  
o Physical Therapist 
o Registered Nurse · After treatment position/precautions:  
o Supine in bed 
o Bed/Chair-wheels locked 
o Bed in low position 
o Call light within reach 
o Family at bedside · Compliance with Program/Exercises: Will assess as treatment progresses · Recommendations/Intent for next treatment session: \"Next visit will focus on advancements to more challenging activities and reduction in assistance provided\". Total Treatment Duration: PT Patient Time In/Time Out Time In: 1824 Time Out: 0430 Betty Alas 55

## 2019-02-08 NOTE — PROGRESS NOTES
Per nursing report patient had R sided weakness. Mild R sided facial droop and speech slur noticed at shift change, night shift RN states this is patient baseilne, no new chamges. Words comprehensible. Patient A&Ox4, Wife now states that she has noticed the patient's \"speech has been slow and slurred and face drooping on right side ever since the procedure\". Wife states that patient has \"TIA's\" at home where he will have speech chagnes that last for a few days (up to 5 days) and then resolve. Pt states they see Dr. Go Cerrato in [facility] to manage condition. He states he saw MD Go Cerrato last Wednesday. NP Jadyn Pulido made aware of changes and wife's report. Verbal orders received for stat CT head without contrast, speech eval, and NPO until speech evaluation. Order to hold PO morning meds until cleared by speech. Will continue to monitor.

## 2019-02-09 LAB
MM INDURATION POC: 0 MM (ref 0–5)
PPD POC: NEGATIVE NEGATIVE

## 2019-02-09 PROCEDURE — 74011250637 HC RX REV CODE- 250/637: Performed by: INTERNAL MEDICINE

## 2019-02-09 PROCEDURE — 97535 SELF CARE MNGMENT TRAINING: CPT

## 2019-02-09 PROCEDURE — 74011250637 HC RX REV CODE- 250/637: Performed by: NURSE PRACTITIONER

## 2019-02-09 PROCEDURE — 65660000000 HC RM CCU STEPDOWN

## 2019-02-09 PROCEDURE — 97530 THERAPEUTIC ACTIVITIES: CPT

## 2019-02-09 PROCEDURE — 97166 OT EVAL MOD COMPLEX 45 MIN: CPT

## 2019-02-09 PROCEDURE — 77030019605

## 2019-02-09 RX ORDER — AMLODIPINE BESYLATE 5 MG/1
5 TABLET ORAL DAILY
Status: DISCONTINUED | OUTPATIENT
Start: 2019-02-09 | End: 2019-02-11 | Stop reason: HOSPADM

## 2019-02-09 RX ADMIN — ASPIRIN 81 MG: 81 TABLET, COATED ORAL at 09:34

## 2019-02-09 RX ADMIN — Medication 10 ML: at 05:49

## 2019-02-09 RX ADMIN — Medication 10 ML: at 16:32

## 2019-02-09 RX ADMIN — LEVOTHYROXINE SODIUM 137 MCG: 50 TABLET ORAL at 05:49

## 2019-02-09 RX ADMIN — ATORVASTATIN CALCIUM 80 MG: 40 TABLET, FILM COATED ORAL at 09:35

## 2019-02-09 RX ADMIN — PANTOPRAZOLE SODIUM 40 MG: 40 TABLET, DELAYED RELEASE ORAL at 09:34

## 2019-02-09 RX ADMIN — AMLODIPINE BESYLATE 5 MG: 5 TABLET ORAL at 16:29

## 2019-02-09 RX ADMIN — Medication 10 ML: at 21:23

## 2019-02-09 RX ADMIN — ESCITALOPRAM OXALATE 10 MG: 10 TABLET ORAL at 09:34

## 2019-02-09 RX ADMIN — APIXABAN 2.5 MG: 2.5 TABLET, FILM COATED ORAL at 21:23

## 2019-02-09 RX ADMIN — APIXABAN 2.5 MG: 2.5 TABLET, FILM COATED ORAL at 09:34

## 2019-02-09 NOTE — PROGRESS NOTES
Tele neuro cart placed at bedside for neuro consult per instruction from [address] [place] [address] . Patient to be seen for consult between 0800 and 1100 this am. Will continue to monitor.

## 2019-02-09 NOTE — PROGRESS NOTES
Bedside and Verbal shift change report given to self (oncoming nurse) by Summer Awad RN (offgoing nurse). Report included the following information SBAR, Kardex and MAR.

## 2019-02-09 NOTE — PROGRESS NOTES
Spoke with Freddy Pulido NP about pt BP being elevated and pt not currently on any BP meds, currently 175/72. Received verbal orders for norvasc 5mg daily and include dose now.

## 2019-02-09 NOTE — PROGRESS NOTES
Verbal bedside report given to Vincent Belcher oncoming RN. Patient's situation, background, assessment and recommendations provided. Opportunity for questions provided. Oncoming RN assumed care of patient.

## 2019-02-09 NOTE — PROGRESS NOTES
Consult to be completed tomorrow with tele neuro MD. Lee Hagen and Mariam Boggs RN aware, routine consult.

## 2019-02-09 NOTE — PROGRESS NOTES
Per MD Gricelda De La Cruz no need for neuro checks to be completed on this patient, will continue with q4hr assessments per unit protocol. No acute event suspected at this time. Tele Neuro MD Pam Paniagua spoke with MD Gricelda De La Cruz about recommendations from consult. MRI not able to be completed for 6 weeks post pacer maker insertion. Will continue to monitor.

## 2019-02-09 NOTE — PROGRESS NOTES
[facility] CARDIOLOGY PROGRESS NOTE 
      
 
2/9/2019 10:21 AM 
 
Admit Date: 2/4/2019 Subjective: No complaints. ROS: 
GEN:  No fever or chills Cardiovascular:  As noted above:no CP or palpitations. Pulmonary:  As noted above:no SOB. Neuro:  No new focal motor or sensory loss Objective:  
  
Vitals:  
 02/08/19 2105 02/09/19 0106 02/09/19 8659 02/09/19 0651 BP: 139/60 164/87 184/78 152/73 Pulse: 60 64 60 62 Resp: 18 17 18 16 Temp: 97.3 °F (36.3 °C) 97.6 °F (36.4 °C) 97.3 °F (36.3 °C) 97.3 °F (36.3 °C) SpO2: 99% 100% 95% 98% Weight:   91.6 kg (201 lb 14.4 oz) Height:      
 
 
Physical Exam: 
General-no distress Neck- supple, no JVD 
CV- regular rate and rhythm no MRG Lung- clear bilaterally Abd- soft, nontender, nondistended Ext- no edema bilaterally. Skin- warm and dry. Pacer site is ok. Psychiatric:  Normal mood and affect. Neurologic:  Alert and oriented X 3 Data Review:  
Recent Labs 02/07/19 
6247   
K 3.6 BUN 39* CREA 2.32* * TELEMETRY:  AV paced. Assessment/Plan:  
 
Principal Problem: 
  Bradycardia (2/4/2019):Resolved with dual chamber pacemaker Active Problems: Hypertension (4/16/2013). Continue current medications. CAD (coronary artery disease) (4/16/2013). Continue current medications. Overview: 1. CABG 2005: LIMA to LAD, SVG to diagonal, SVG to OM and SVG to RCA 2. NSTEMI (12/11/13): Peak troponin 4.66. 
     A. LHC: Severe 3 vessel CAD. Occluded SVG to PDA and diagonal. Stenosis  
    in SVG to OM. B. PCI of SVG to OM: Distal body treated with 4 x 18 mm vision BMS. Prox  
    body lesion treated with integrity 4 x 18 BMS. C. Echo: Normal LV systolic function. EF 55-60%. Mild mitral/tricuspid  
    regurgitation. 3. Echo (9/23/14): EF 50-55%. Mild LAE. Mild mitral regurgitation. Hypothyroidism (4/16/2013) Hyperlipidemia (4/16/2013) Gout (4/16/2013) Paroxysmal atrial fibrillation ([facility] [address] (6/18/2014). Continue current medications. Overview: 1. Atrial flutter Diagnosed 6/18/14: s/p ablation. 2. Echo (6/19/14): EF 45-50%. Moderate mitral regurgitation. 3. Ecardio (8/12-8/25/14): No atrial arrhythmias. 4.  Admitted with CVA (3/18): Atrial fibrillation noted. Eliquis  
    started. History of CVA (cerebrovascular accident) (6/18/2014) Overview: History of mini-strokes. :Tele Neuro this. Can do MRI for 6 weeks per [employer]. .Continue current medications. Awaiting Rehab.  
 
 
 
 
 
 
 
Brynn Roche MD 
2/9/2019 10:21 AM

## 2019-02-09 NOTE — PROGRESS NOTES
Problem: Self Care Deficits Care Plan (Adult) Goal: *Acute Goals and Plan of Care (Insert Text) 1. Patient will complete upper body bathing and dressing with min A, additional time, and adaptive equipment as needed. 2. Patient will complete self feeding with setup 50% of meals. 3. Patient will tolerate 25 minutes of OT treatment with 4 rest breaks to increase activity tolerance for ADLs. 4. Patient will complete functional transfers with max A and adaptive equipment as needed. 5. Patient will sit unsupported on edge of bed x 10 mins for balance challenge tasks/ADLs. 6. Patient will complete simple grooming tasks in supported sitting with min A. Timeframe: 7 visits OCCUPATIONAL THERAPY: Initial Assessment, Daily Note and AM  
 2019INPATIENT: OT Visit Days: 1 [place] / [place] B / Product Type: Medicare /   
[employer] precautions NAME/AGE/GENDER: Stevie Sharpe is a 80 y.o. male PRIMARY DIAGNOSIS:  Bradycardia [R00.1] Bradycardia [R00.1] Bradycardia Bradycardia Procedure(s) (LRB): 
PACEMAKER INSERTION (N/A) 3 Days Post-Op ICD-10: Treatment Diagnosis:  
 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 
  falls, Other medication ASSESSMENT:  
Mr. Sameera Kunz is 81 YO L dominant WM admitted with above and underwent Pacemaker placement 3 days ago, pt with L UE in sling. A & O x4, could state name and , but needed to be given 2 choices for remaining orientations questions due to word finding problems and decreased speech that wife reports are new since surgery. Able to read from board and OT badge, wears reading glasses, reports blurry vision new.   Pt had neuro tel conference this am, in fact, interrupting OT session and OT returned later when MD was finished. RN cleared pt for OT. Wife at bedside and provided most history. Pt talked in spurts. Quiet vocal quality which wife reports is different, normally a loud voice. Currently on 3L NC oxygen, none at home. Wife reports they live together in a 2 level home but upstairs is storage only, they rarely go there. No steps at entrance, she can push his WC over the threshhold. Pt has not walked in 9 months, HH PT had just begun standing to RW and pt had worked up to 3 mins static standing. Has been needing help with all ADLs as well, in varying degrees and varied from day to day. When OT first entered room, pt had muffing in R hand trying to feed himself and wife was spooning eggs in his mouth. OT noted coughing on occasion after swallowing. Asked if pt was able and she reported she just does it to speed the process but that he can, just makes a mess. Pt has numerous DME at home, including rollator, WC, RW, [place], BSC, hospital bed, shower chair, GBs throughout home, stand lift that wife uses to change pt from place to place, Not driving. Had prior CVA in 2014 and has had chronic TIAs with R sided symptoms that last about 5 days and then resolve themselves. She repots he usually yawns a lot when they happen and his R side is weaker and his speech is impaired. Pt able to touch L UE to chin, nose and forehead but due to pacer precautions did not try top of head. R UE was harder with drift and poor coordination noted. Did get to chin and nose with additional time and was eventually able to get to forehead. Total A bed mobility up to edge of bed. Constant support required.  Returned to supine and the  HOB elevated to upright position for pt totally sitting up to complete eating breakfast with additional time and OT stressing importance of wife allowing pt to go slowly and eat safely and allowing pt to self feed. More coughing noted. RN entered and gave oral meds. No coughing noted with swallowing pills. Wife alerted to let pt sit up for 30 mins after completed eating to prevent aspiration with verbal understanding. Pt is functioning somewhat below his baseline and could benefit from skilled OT to maximize his independence, safety, activity tolerance with ADLs and mobility, but will need further rehab. Unsure of true rehab potential. 
 
This section established at most recent assessment PROBLEM LIST (Impairments causing functional limitations): 1. Decreased Strength 2. Decreased ADL/Functional Activities 3. Decreased Transfer Abilities 4. Decreased Ambulation Ability/Technique 5. Decreased Balance 6. Decreased Activity Tolerance 7. Decreased Pacing Skills 8. Decreased Work Simplification/Energy Conservation Techniques 9. Increased Fatigue 10. Increased Shortness of Breath 11. Decreased Flexibility/Joint Mobility 12. Edema/Girth 13. Decreased Knowledge of Precautions 14. Decreased Skin Integrity/Hygeine 15. Decreased Cognition INTERVENTIONS PLANNED: (Benefits and precautions of occupational therapy have been discussed with the patient.) 1. Activities of daily living training 2. Adaptive equipment training 3. Balance training 4. Clothing management 5. Cognitive training 6. Community reintergration 7. Donning&doffing training 8. Jairo tech training 9. Modalities 10. Neuromuscular re-eduation 11. Re-evaluation 12. Therapeutic activity 13. Therapeutic exercise 14. Wheelchair management TREATMENT PLAN: Frequency/Duration: Follow patient 3x per week to address above goals. Rehabilitation Potential For Stated Goals: Good RECOMMENDED REHABILITATION/EQUIPMENT: (at time of discharge pending progress): Due to the probability of continued deficits (see above) this patient will likely need continued skilled occupational therapy after discharge. Equipment: ? TBD based on progress OCCUPATIONAL PROFILE AND HISTORY:  
History of Present Injury/Illness (Reason for Referral): 
See H& P Past Medical History/Comorbidities:  
Mr. Art Joshi  has a past medical history of Arthritis, Asthma, Atrial flutter with rapid ventricular response ([address] (6/18/2014), Basal cell carcinoma of skin of other and unspecified parts of face (2/20/2014), Benign hypertensive heart disease without heart failure (9/29/2015), BPH (benign prostatic hyperplasia) (4/16/2013), CAD (coronary artery disease), Calculus of kidney (2/20/2014), Cancer ([address] Carotid artery stenosis with cerebral infarction ([address] (9/29/2015), Chest pain, unspecified (12/11/2013), CVA (cerebral vascular accident) ([address] (4/16/2013), Diarrhea following gastrointestinal surgery (4/16/2013), Diverticulitis (colectomy), Dizziness (6/18/2014), Generalized weakness, GERD (gastroesophageal reflux disease), Gout (4/16/2013), Heart disease, unspecified (2/20/2014), History of basal cell cancer (nose), History of CVA (cerebrovascular accident) (6/18/2014), History of kidney stones (2006), Hyperlipidemia (4/16/2013), Hypertension, Hypertrophy of prostate with urinary obstruction and other lower urinary tract symptoms (LUTS) (2/20/2014), Hypothyroidism (4/16/2013), Incomplete bladder emptying (2/20/2014), Iron deficiency anemia (4/16/2013), NSTEMI (non-ST elevated myocardial infarction) ([address] (12/12/2013), Osteoarthritis (4/16/2013), Platelet inhibition due to Plavix, Pure hypercholesterolemia (2/20/2014), Stroke ([address] Unspecified adverse effect of anesthesia, Unspecified asthma (2/20/2014), Unspecified disorder of thyroid (2/20/2014), Urinary frequency (2/20/2014), and Urinary tract infection, site not specified (11/20/2013). He also has no past medical history of Difficult intubation.   Mr. Art Joshi  has a past surgical history that includes hx cholecystectomy; hx small bowel resection; pr removal of kidney stone (05/2016); hx carotid endarterectomy (Right, 2011); hx appendectomy (age [address]); hx cataract removal (Bilateral, 2006); hx tonsillectomy; hx adenoidectomy; pr cabg, artery-vein, four (2005); pr left heart cath,percutaneous (12/12/2013); hx other surgical (3/12/14); hx coronary stent placement; pr total knee arthroplasty (2007); and pr total knee arthroplasty (2012). Social History/Living Environment:  
Home Environment: Private residence # Steps to Enter: 0 One/Two Story Residence: Two story, live on 1st floor # of Interior Steps: 15 Interior Rails: Right Lift Chair Available: No 
Living Alone: No 
Support Systems: Spouse/Significant Other/Partner, Child(nigel), Friends \ neighbors Patient Expects to be Discharged to[de-identified] Rehabilitation facility Current DME Used/Available at Home: Blood pressure cuff, Cane, straight, Walker, rollator, Walker, rolling, Wheelchair, Hospital bed, Commode, bedside, Grab bars, Raised toilet seat, Safety frame toliet, Shower chair(Stand Lift) Tub or Shower Type: Shower Prior Level of Function/Work/Activity: 
Wife reports they live together in a 2 level home but upstairs is storage only, they rarely go there. No steps at entrance, she can push his WC over the threshhold. Pt has not walked in 9 months, HH PT had just begun standing to RW and pt had worked up to 3 mins static standing. Has been needing help with all ADLs as well, in varying degrees and varied from day to day. No longer driving. Dominant Side: LEFT Previous Treatment Approaches: HHRN and PT Number of Personal Factors/Comorbidities that affect the Plan of Care: Extensive review of physical, cognitive, and psychosocial performance (3+):  HIGH COMPLEXITY ASSESSMENT OF OCCUPATIONAL PERFORMANCE[de-identified]  
Activities of Daily Living:  
Basic ADLs (From Assessment) Complex ADLs (From Assessment) Feeding: Maximum assistance, Additional time(wife has been feeding last several days to months?) Oral Facial Hygiene/Grooming: Maximum assistance Bathing: Maximum assistance Upper Body Dressing: Maximum assistance Lower Body Dressing: Total assistance Toileting: Total assistance, Adaptive equipment(adult brief and bedpan) Instrumental ADL Meal Preparation: Total assistance Homemaking: Total assistance Medication Management: Setup Financial Management: Setup Grooming/Bathing/Dressing Activities of Daily Living Cognitive Retraining Safety/Judgement: Decreased awareness of need for assistance;Decreased insight into deficits; Fall prevention Functional Transfers Bathroom Mobility: Dependent/total assistance Toilet Transfer : Total assistance Tub Transfer: Total assistance Shower Transfer: Total assistance Bed/Mat Mobility Rolling: Total assistance Supine to Sit: Total assistance Sit to Supine: Total assistance Sit to Stand: (not safe to attempt, has not walked in 9 months) Bed to Chair: Total assistance Scooting: Total assistance Most Recent Physical Functioning:  
Gross Assessment: 
AROM: Generally decreased, functional(B UEs, L drifitng and awkward takes more effort to move) PROM: Generally decreased, functional 
Strength: Generally decreased, functional(L stronger than R UE) Coordination: Generally decreased, functional(L WFLs, R impaired) Tone: Normal 
Sensation: Impaired(B hands tingle) Posture: 
  
Balance: 
Sitting: Impaired; With support Sitting - Static: Poor (constant support) Sitting - Dynamic: Poor (constant support) Bed Mobility: 
Rolling: Total assistance Supine to Sit: Total assistance Sit to Supine: Total assistance Scooting: Total assistance Wheelchair Mobility: 
  
Transfers: 
Sit to Stand: (not safe to attempt, has not walked in 9 months) Bed to Chair: Total assistance Patient Vitals for the past 6 hrs: BP BP Patient Position SpO2 O2 Flow Rate (L/min) Pulse 19 0449    3 l/min   
19 0514 184/78 At rest 95 %  60  
19 0755 152/73  98 % 3 l/min 62 Mental Status Neurologic State: Alert Orientation Level: Oriented to person(due to expressive aphasia,could pick w 2 choices correctly) Cognition: Follows commands Perception: Appears intact Perseveration: No perseveration noted(word finding trouble) Safety/Judgement: Decreased awareness of need for assistance, Decreased insight into deficits, Fall prevention Physical Skills Involved: 1. Range of Motion 2. Balance 3. Strength 4. Activity Tolerance 5. Sensation 6. Fine Motor Control 7. Gross Motor Control 8. Vision 9. Edema 10. Skin Integrity Cognitive Skills Affected (resulting in the inability to perform in a timely and safe manner): 1. Perception 2. Executive Function 3. Short Term Recall 4. Sustained Attention 5. Divided Attention 6. Comprehension 7. Expression Psychosocial Skills Affected: 1. Habits/Routines 2. Environmental Adaptation 3. Social Interaction 4. Emotional Regulation 5. Self-Awareness 6. Awareness of Others 7. Social Roles Number of elements that affect the Plan of Care: 5+:  HIGH COMPLEXITY CLINICAL DECISION MAKIN [facility] [address] [address] AM-PAC 6 Clicks Daily Activity Inpatient Short Form How much help from another person does the patient currently need. .. Total A Lot A Little None 1. Putting on and taking off regular lower body clothing? [x] 1   [] 2   [] 3   [] 4  
2. Bathing (including washing, rinsing, drying)? [] 1   [x] 2   [] 3   [] 4  
3. Toileting, which includes using toilet, bedpan or urinal?   [x] 1   [] 2   [] 3   [] 4  
4. Putting on and taking off regular upper body clothing? [] 1   [x] 2   [] 3   [] 4  
5. Taking care of personal grooming such as brushing teeth?    [] 1   [x] 2   [] 3   [] 4  
 6.  Eating meals? [] 1   [x] 2   [] 3   [] 4  
© 2007, Trustees of [address] [place] [address] [address] [address], under license to [employer]. All rights reserved Score:  Initial: 10, completed 2/9/2019 Most Recent: X (Date: -- ) Interpretation of Tool:  Represents activities that are increasingly more difficult (i.e. Bed mobility, Transfers, Gait). Medical Necessity:    
· Patient demonstrates fair rehab potential due to higher previous functional level. Reason for Services/Other Comments: 
· Patient continues to require skilled intervention due to s/p pacer placement and decreased ADLs and mobility. Use of outcome tool(s) and clinical judgement create a POC that gives a: MODERATE COMPLEXITY  
 
 
 
TREATMENT:  
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  
Pain Intensity 1: 0  Post Session:  No complaint of pain Self Care: (30 mins): Procedure(s) (per grid) utilized to improve and/or restore self-care/home management as related to dressing, bathing, grooming and self feeding. Required maximal visual, verbal, manual, tactile, physical assist and   cueing to facilitate activities of daily living skills and compensatory activities. Therapeutic Activity: (    15 mins): Therapeutic activities including Bed transfers and sitting balance to improve mobility, strength, balance, coordination and activity tolerance for ADLs and mobility. Required maximal   to promote dynamic balance in sitting. Evaluation: 10 mins Braces/Orthotics/Lines/Etc:  
· IV 
· adult brief · O2 Device: Nasal cannula Treatment/Session Assessment:   
· Response to Treatment:  No adverse reaction, maybe swallowing issues, speech affected · Interdisciplinary Collaboration:  
o Occupational Therapist 
o Registered Nurse 
o Physician 
o Certified Nursing Assistant/Patient Care Technician · After treatment position/precautions:  
o Supine in bed 
o Bed alarm/tab alert on 
o Bed/Chair-wheels locked 
o Bed in low position 
o Call light within reach 
o RN notified 
o Family at bedside 
o Nurse at bedside 
o Side rails x 3 
o HOB elevated, pt eating · Compliance with Program/Exercises: Compliant most of the time. · Recommendations/Intent for next treatment session: \"Next visit will focus on advancements to more challenging activities and reduction in assistance provided\". Total Treatment Duration:   55 mins OT Patient Time In/Time Out Time In: 5736 Time Out: 5078 Vishal Lee, ARIK Lee, MS, OTR/L

## 2019-02-09 NOTE — PROGRESS NOTES
Bedside and Verbal shift change report given to Keyla Knight RN (oncoming nurse) by self Jennifer Mejia nurse). Report included the following information SBAR, Kardex and MAR. Neuro assessment completed with oncoming RN, no new changes. Pt resting in bed, wife at bedside, Oncoming RN assuming care.

## 2019-02-10 LAB
MM INDURATION POC: 0 MM (ref 0–5)
PPD POC: NEGATIVE NEGATIVE

## 2019-02-10 PROCEDURE — 74011250637 HC RX REV CODE- 250/637: Performed by: INTERNAL MEDICINE

## 2019-02-10 PROCEDURE — 74011250636 HC RX REV CODE- 250/636

## 2019-02-10 PROCEDURE — 74011250637 HC RX REV CODE- 250/637: Performed by: NURSE PRACTITIONER

## 2019-02-10 PROCEDURE — 65660000000 HC RM CCU STEPDOWN

## 2019-02-10 RX ORDER — HYDRALAZINE HYDROCHLORIDE 20 MG/ML
INJECTION INTRAMUSCULAR; INTRAVENOUS
Status: COMPLETED
Start: 2019-02-10 | End: 2019-02-10

## 2019-02-10 RX ORDER — HYDRALAZINE HYDROCHLORIDE 20 MG/ML
10 INJECTION INTRAMUSCULAR; INTRAVENOUS
Status: DISCONTINUED | OUTPATIENT
Start: 2019-02-10 | End: 2019-02-11 | Stop reason: HOSPADM

## 2019-02-10 RX ADMIN — AMLODIPINE BESYLATE 5 MG: 5 TABLET ORAL at 08:43

## 2019-02-10 RX ADMIN — ATORVASTATIN CALCIUM 80 MG: 40 TABLET, FILM COATED ORAL at 08:43

## 2019-02-10 RX ADMIN — APIXABAN 2.5 MG: 2.5 TABLET, FILM COATED ORAL at 08:43

## 2019-02-10 RX ADMIN — HYDRALAZINE HYDROCHLORIDE 10 MG: 20 INJECTION INTRAMUSCULAR; INTRAVENOUS at 01:37

## 2019-02-10 RX ADMIN — LEVOTHYROXINE SODIUM 137 MCG: 50 TABLET ORAL at 08:43

## 2019-02-10 RX ADMIN — PANTOPRAZOLE SODIUM 40 MG: 40 TABLET, DELAYED RELEASE ORAL at 08:43

## 2019-02-10 RX ADMIN — ACETAMINOPHEN 650 MG: 325 TABLET, FILM COATED ORAL at 21:52

## 2019-02-10 RX ADMIN — ASPIRIN 81 MG: 81 TABLET, COATED ORAL at 08:43

## 2019-02-10 RX ADMIN — Medication 5 ML: at 13:40

## 2019-02-10 RX ADMIN — Medication 10 ML: at 21:52

## 2019-02-10 RX ADMIN — ACETAMINOPHEN 650 MG: 325 TABLET, FILM COATED ORAL at 17:43

## 2019-02-10 RX ADMIN — APIXABAN 2.5 MG: 2.5 TABLET, FILM COATED ORAL at 21:52

## 2019-02-10 RX ADMIN — ESCITALOPRAM OXALATE 10 MG: 10 TABLET ORAL at 08:43

## 2019-02-10 RX ADMIN — Medication 10 ML: at 05:17

## 2019-02-10 NOTE — PROGRESS NOTES
[facility] CARDIOLOGY PROGRESS NOTE 
      
 
2/10/2019 12:29 PM 
 
Admit Date: 2/4/2019 Subjective: No complaints. ROS: 
GEN:  No fever or chills Cardiovascular:  As noted above:no CP or palpitations. Pulmonary:  As noted above:no SOB. Neuro:  No new focal motor or sensory loss Objective:  
  
Vitals:  
 02/10/19 0104 02/10/19 5144 02/10/19 2270 02/10/19 7286 BP: 175/76 143/63 147/69 156/75 Pulse: 62  61 62 Resp: 18  18 16 Temp: 97.9 °F (36.6 °C)  97.7 °F (36.5 °C) 97.2 °F (36.2 °C) SpO2: 98%  99% 100% Weight:   92.1 kg (203 lb) Height:      
 
 
Physical Exam: 
General-no distress Neck- supple, no JVD 
CV- regular rate and rhythm no MRG Lung- clear bilaterally Abd- soft, nontender, nondistended Ext- no edema bilaterally. Skin- warm and dry. Dry bandage present over pacer. Psychiatric:  Normal mood and affect. Neurologic:  Alert with right sided weakness from old CVA. Data Review: No results for input(s): NA, K, MG, BUN, CREA, GLU, WBC, HGB, HCT, PLT, INR, TRIGL, LDL, HDL, HGBEXT, HCTEXT, PLTEXT in the last 72 hours. No lab exists for component: TROPI, TCHOL, INREXT 
 
TELEMETRY: V paced Assessment/Plan:  
 
Principal Problem: 
  Bradycardia (2/4/2019):s/p Pacemaker:stable. Hopefully discharge to rehab on 2- Active Problems: Hypertension (4/16/2013):stable. .Continue current medications. CAD (coronary artery disease) (4/16/2013): No angina. .Continue current medications. Overview: 1. CABG 2005: LIMA to LAD, SVG to diagonal, SVG to OM and SVG to RCA 2. NSTEMI (12/11/13): Peak troponin 4.66. 
     A. LHC: Severe 3 vessel CAD. Occluded SVG to PDA and diagonal. Stenosis  
    in SVG to OM. B. PCI of SVG to OM: Distal body treated with 4 x 18 mm vision BMS. Prox  
    body lesion treated with integrity 4 x 18 BMS. C. Echo: Normal LV systolic function. EF 55-60%. Mild mitral/tricuspid  
    regurgitation. 3. Echo (9/23/14): EF 50-55%. Mild LAE. Mild mitral regurgitation. Hypothyroidism (4/16/2013):on Synthroid Hyperlipidemia (4/16/2013):on Lipitor. Gout (4/16/2013) Paroxysmal atrial fibrillation ([address] (6/18/2014): Tolerating Eliquis. Overview: 1. Atrial flutter Diagnosed 6/18/14: s/p ablation. 2. Echo (6/19/14): EF 45-50%. Moderate mitral regurgitation. 3. Ecardio (8/12-8/25/14): No atrial arrhythmias. 4.  Admitted with CVA (3/18): Atrial fibrillation noted. Eliquis  
    started. History of CVA (cerebrovascular accident) (6/18/2014): Awaiting rehab. Recent neurologic concerns appear stable. Overview: History of mini-strokes.  
 
 
 
 
 
 
 
Leeann Conti MD 
2/10/2019 12:29 PM

## 2019-02-10 NOTE — PROGRESS NOTES
Bedside and Verbal shift change report given to Darlin Cordova RN (oncoming nurse) by self Ginger Boggs nurse). Report included the following information SBAR, Kardex and MAR. L subclavian dressing CDI.

## 2019-02-10 NOTE — PROGRESS NOTES
Verbal bedside report received from Ochoa Marvin, [place]. Assumed care of patient. L subclavian site visualized.

## 2019-02-10 NOTE — PROGRESS NOTES
CHATO Méndez contacted regarding SBP in the 170s. Ordered hydralazine 10mg  every 6 hours as needed per PA. Will continue to monitor.

## 2019-02-10 NOTE — PROGRESS NOTES
Verbal bedside report received from Rosemary Phelps, [place]. Assumed care of patient.

## 2019-02-11 VITALS
WEIGHT: 204.9 LBS | HEIGHT: 73 IN | OXYGEN SATURATION: 98 % | SYSTOLIC BLOOD PRESSURE: 127 MMHG | RESPIRATION RATE: 18 BRPM | HEART RATE: 68 BPM | BODY MASS INDEX: 27.16 KG/M2 | DIASTOLIC BLOOD PRESSURE: 61 MMHG | TEMPERATURE: 98.4 F

## 2019-02-11 LAB
ANION GAP SERPL CALC-SCNC: 9 MMOL/L (ref 7–16)
BASOPHILS # BLD: 0.1 K/UL (ref 0–0.2)
BASOPHILS NFR BLD: 1 % (ref 0–2)
BUN SERPL-MCNC: 23 MG/DL (ref 8–23)
CALCIUM SERPL-MCNC: 8.5 MG/DL (ref 8.3–10.4)
CHLORIDE SERPL-SCNC: 110 MMOL/L (ref 98–107)
CO2 SERPL-SCNC: 24 MMOL/L (ref 21–32)
CREAT SERPL-MCNC: 1.64 MG/DL (ref 0.8–1.5)
DIFFERENTIAL METHOD BLD: ABNORMAL
EOSINOPHIL # BLD: 1 K/UL (ref 0–0.8)
EOSINOPHIL NFR BLD: 10 % (ref 0.5–7.8)
ERYTHROCYTE [DISTWIDTH] IN BLOOD BY AUTOMATED COUNT: 14.6 % (ref 11.9–14.6)
GLUCOSE SERPL-MCNC: 147 MG/DL (ref 65–100)
HCT VFR BLD AUTO: 33.4 % (ref 41.1–50.3)
HGB BLD-MCNC: 10.7 G/DL (ref 13.6–17.2)
IMM GRANULOCYTES # BLD AUTO: 0.1 K/UL (ref 0–0.5)
IMM GRANULOCYTES NFR BLD AUTO: 1 % (ref 0–5)
LYMPHOCYTES # BLD: 2 K/UL (ref 0.5–4.6)
LYMPHOCYTES NFR BLD: 20 % (ref 13–44)
MCH RBC QN AUTO: 27.3 PG (ref 26.1–32.9)
MCHC RBC AUTO-ENTMCNC: 32 G/DL (ref 31.4–35)
MCV RBC AUTO: 85.2 FL (ref 79.6–97.8)
MONOCYTES # BLD: 0.8 K/UL (ref 0.1–1.3)
MONOCYTES NFR BLD: 8 % (ref 4–12)
NEUTS SEG # BLD: 6.1 K/UL (ref 1.7–8.2)
NEUTS SEG NFR BLD: 61 % (ref 43–78)
NRBC # BLD: 0 K/UL (ref 0–0.2)
PLATELET # BLD AUTO: 219 K/UL (ref 150–450)
PMV BLD AUTO: 11.8 FL (ref 9.4–12.3)
POTASSIUM SERPL-SCNC: 3.1 MMOL/L (ref 3.5–5.1)
RBC # BLD AUTO: 3.92 M/UL (ref 4.23–5.6)
SODIUM SERPL-SCNC: 143 MMOL/L (ref 136–145)
WBC # BLD AUTO: 10.1 K/UL (ref 4.3–11.1)

## 2019-02-11 PROCEDURE — 74011250637 HC RX REV CODE- 250/637: Performed by: NURSE PRACTITIONER

## 2019-02-11 PROCEDURE — 74011250637 HC RX REV CODE- 250/637: Performed by: INTERNAL MEDICINE

## 2019-02-11 PROCEDURE — 85025 COMPLETE CBC W/AUTO DIFF WBC: CPT

## 2019-02-11 PROCEDURE — 80048 BASIC METABOLIC PNL TOTAL CA: CPT

## 2019-02-11 PROCEDURE — 36415 COLL VENOUS BLD VENIPUNCTURE: CPT

## 2019-02-11 RX ORDER — POTASSIUM CHLORIDE 20 MEQ/1
40 TABLET, EXTENDED RELEASE ORAL
Status: COMPLETED | OUTPATIENT
Start: 2019-02-11 | End: 2019-02-11

## 2019-02-11 RX ORDER — POTASSIUM CHLORIDE 20 MEQ/1
40 TABLET, EXTENDED RELEASE ORAL ONCE
Status: COMPLETED | OUTPATIENT
Start: 2019-02-11 | End: 2019-02-11

## 2019-02-11 RX ADMIN — ATORVASTATIN CALCIUM 80 MG: 40 TABLET, FILM COATED ORAL at 09:31

## 2019-02-11 RX ADMIN — Medication 10 ML: at 05:00

## 2019-02-11 RX ADMIN — APIXABAN 2.5 MG: 2.5 TABLET, FILM COATED ORAL at 09:31

## 2019-02-11 RX ADMIN — ASPIRIN 81 MG: 81 TABLET, COATED ORAL at 09:31

## 2019-02-11 RX ADMIN — ESCITALOPRAM OXALATE 10 MG: 10 TABLET ORAL at 09:31

## 2019-02-11 RX ADMIN — LEVOTHYROXINE SODIUM 137 MCG: 50 TABLET ORAL at 09:31

## 2019-02-11 RX ADMIN — PANTOPRAZOLE SODIUM 40 MG: 40 TABLET, DELAYED RELEASE ORAL at 09:31

## 2019-02-11 RX ADMIN — AMLODIPINE BESYLATE 5 MG: 5 TABLET ORAL at 09:31

## 2019-02-11 RX ADMIN — ACETAMINOPHEN 650 MG: 325 TABLET, FILM COATED ORAL at 04:56

## 2019-02-11 RX ADMIN — ACETAMINOPHEN 650 MG: 325 TABLET, FILM COATED ORAL at 15:32

## 2019-02-11 RX ADMIN — POTASSIUM CHLORIDE 40 MEQ: 20 TABLET, EXTENDED RELEASE ORAL at 15:25

## 2019-02-11 RX ADMIN — POTASSIUM CHLORIDE 40 MEQ: 20 TABLET, EXTENDED RELEASE ORAL at 11:29

## 2019-02-11 NOTE — PROGRESS NOTES
Pt to be dc to [facility] today. Rm # [phone] at [facility]. All parties in agreement, pt to be transported via [facility]. No further CM needs. Care Management Interventions PCP Verified by CM: Yes(last week) Palliative Care Criteria Met (RRAT>21 & CHF Dx)?: No(RRAT 20 Dx Bradycardia) Mode of Transport at Discharge: [facility] Transport Time of Discharge: [phone] Transition of Care Consult (CM Consult):  [address]: No 
Reason Outside [facility]: Patient already serviced by other home care/hospice agency([facility] [address] [facility] RN/PT) Partner SNF: Yes Discharge Durable Medical Equipment: No(Walker, [facility], Wheelchair, Chair lift. hospital bed) Physical Therapy Consult: No 
Occupational Therapy Consult: No 
Speech Therapy Consult: No 
Current Support Network: Lives with Spouse Confirm Follow Up Transport: Family Plan discussed with Pt/Family/Caregiver: Yes Freedom of Choice Offered: Yes Discharge Location Discharge Placement: Rehab Unit Subacute

## 2019-02-11 NOTE — DISCHARGE INSTRUCTIONS
Patient Education     Patient Education        Atrial Fibrillation: Care Instructions  Your Care Instructions    Atrial fibrillation is an irregular and often fast heartbeat. Treating this condition is important for several reasons. It can cause blood clots, which can travel from your heart to your brain and cause a stroke. If you have a fast heartbeat, you may feel lightheaded, dizzy, and weak. An irregular heartbeat can also increase your risk for heart failure. Atrial fibrillation is often the result of another heart condition, such as high blood pressure or coronary artery disease. Making changes to improve your heart condition will help you stay healthy and active. Follow-up care is a key part of your treatment and safety. Be sure to make and go to all appointments, and call your doctor if you are having problems. It's also a good idea to know your test results and keep a list of the medicines you take. How can you care for yourself at home? Medicines    · Take your medicines exactly as prescribed. Call your doctor if you think you are having a problem with your medicine. You will get more details on the specific medicines your doctor prescribes.     · If your doctor has given you a blood thinner to prevent a stroke, be sure you get instructions about how to take your medicine safely. Blood thinners can cause serious bleeding problems.     · Do not take any vitamins, over-the-counter drugs, or herbal products without talking to your doctor first.    Lifestyle changes    · Do not smoke. Smoking can increase your chance of a stroke and heart attack. If you need help quitting, talk to your doctor about stop-smoking programs and medicines. These can increase your chances of quitting for good.     · Eat a heart-healthy diet.     · Stay at a healthy weight. Lose weight if you need to.     · Limit alcohol to 2 drinks a day for men and 1 drink a day for women.  Too much alcohol can cause health problems.     · Avoid colds and flu. Get a pneumococcal vaccine shot. If you have had one before, ask your doctor whether you need another dose. Get a flu shot every year. If you must be around people with colds or flu, wash your hands often. Activity    · If your doctor recommends it, get more exercise. Walking is a good choice. Bit by bit, increase the amount you walk every day. Try for at least 30 minutes on most days of the week. You also may want to swim, bike, or do other activities. Your doctor may suggest that you join a cardiac rehabilitation program so that you can have help increasing your physical activity safely.     · Start light exercise if your doctor says it is okay. Even a small amount will help you get stronger, have more energy, and manage stress. Walking is an easy way to get exercise. Start out by walking a little more than you did in the hospital. Gradually increase the amount you walk.     · When you exercise, watch for signs that your heart is working too hard. You are pushing too hard if you cannot talk while you are exercising. If you become short of breath or dizzy or have chest pain, sit down and rest immediately.     · Check your pulse regularly. Place two fingers on the artery at the palm side of your wrist, in line with your thumb. If your heartbeat seems uneven or fast, talk to your doctor. When should you call for help? Call 911 anytime you think you may need emergency care. For example, call if:    · You have symptoms of a heart attack. These may include:  ? Chest pain or pressure, or a strange feeling in the chest.  ? Sweating. ? Shortness of breath. ? Nausea or vomiting. ? Pain, pressure, or a strange feeling in the back, neck, jaw, or upper belly or in one or both shoulders or arms. ? Lightheadedness or sudden weakness. ? A fast or irregular heartbeat. After you call 911, the  may tell you to chew 1 adult-strength or 2 to 4 low-dose aspirin. Wait for an ambulance.  Do not try to drive yourself.     · You have symptoms of a stroke. These may include:  ? Sudden numbness, tingling, weakness, or loss of movement in your face, arm, or leg, especially on only one side of your body. ? Sudden vision changes. ? Sudden trouble speaking. ? Sudden confusion or trouble understanding simple statements. ? Sudden problems with walking or balance. ? A sudden, severe headache that is different from past headaches.     · You passed out (lost consciousness).    Call your doctor now or seek immediate medical care if:    · You have new or increased shortness of breath.     · You feel dizzy or lightheaded, or you feel like you may faint.     · Your heart rate becomes irregular.     · You can feel your heart flutter in your chest or skip heartbeats. Tell your doctor if these symptoms are new or worse.    Watch closely for changes in your health, and be sure to contact your doctor if you have any problems. Where can you learn more? Go to http://vazquez-glory.info/. Enter U020 in the search box to learn more about \"Atrial Fibrillation: Care Instructions. \"  Current as of: July 22, 2018  Content Version: 11.9  © 0515-1843 [employer]. Care instructions adapted under license by [employer] (which disclaims liability or warranty for this information). If you have questions about a medical condition or this instruction, always ask your healthcare professional. [address] any warranty or liability for your use of this information.            DISCHARGE SUMMARY from Nurse    PATIENT INSTRUCTIONS:    After general anesthesia or intravenous sedation, for 24 hours or while taking prescription Narcotics:  · Limit your activities  · Do not drive and operate hazardous machinery  · Do not make important personal or business decisions  · Do  not drink alcoholic beverages  · If you have not urinated within 8 hours after discharge, please contact your surgeon on call.    Report the following to your surgeon:  · Excessive pain, swelling, redness or odor of or around the surgical area  · Temperature over 100.5  · Nausea and vomiting lasting longer than 4 hours or if unable to take medications  · Any signs of decreased circulation or nerve impairment to extremity: change in color, persistent  numbness, tingling, coldness or increase pain  · Any questions    What to do at Home:  Recommended activity: Activity as tolerated,     If you experience any of the following symptoms shortness of breath or chest pain, please follow up with [address] [place] [address] [address] [facility]. *  Please give a list of your current medications to your Primary Care Provider. *  Please update this list whenever your medications are discontinued, doses are      changed, or new medications (including over-the-counter products) are added. *  Please carry medication information at all times in case of emergency situations. These are general instructions for a healthy lifestyle:    No smoking/ No tobacco products/ Avoid exposure to second hand smoke  Surgeon General's Warning:  Quitting smoking now greatly reduces serious risk to your health. Obesity, smoking, and sedentary lifestyle greatly increases your risk for illness    A healthy diet, regular physical exercise & weight monitoring are important for maintaining a healthy lifestyle    You may be retaining fluid if you have a history of heart failure or if you experience any of the following symptoms:  Weight gain of 3 pounds or more overnight or 5 pounds in a week, increased swelling in our hands or feet or shortness of breath while lying flat in bed. Please call your doctor as soon as you notice any of these symptoms; do not wait until your next office visit.     Recognize signs and symptoms of STROKE:    F-face looks uneven    A-arms unable to move or move unevenly    S-speech slurred or non-existent    T-time-call 911 as soon as signs and symptoms begin-DO NOT go       Back to bed or wait to see if you get better-TIME IS BRAIN. Warning Signs of HEART ATTACK     Call 911 if you have these symptoms:   Chest discomfort. Most heart attacks involve discomfort in the center of the chest that lasts more than a few minutes, or that goes away and comes back. It can feel like uncomfortable pressure, squeezing, fullness, or pain.  Discomfort in other areas of the upper body. Symptoms can include pain or discomfort in one or both arms, the back, neck, jaw, or stomach.  Shortness of breath with or without chest discomfort.  Other signs may include breaking out in a cold sweat, nausea, or lightheadedness. Don't wait more than five minutes to call 911 - MINUTES MATTER! Fast action can save your life. Calling 911 is almost always the fastest way to get lifesaving treatment. Emergency Medical Services staff can begin treatment when they arrive -- up to an hour sooner than if someone gets to the hospital by car. The discharge information has been reviewed with the caregiver. The caregiver verbalized understanding. Discharge medications reviewed with the caregiver and appropriate educational materials and side effects teaching were provided.   ___________________________________________________________________________________________________________________________________

## 2019-02-11 NOTE — PROGRESS NOTES
Pt to be transferred to [facility] this afternoon. Discharge paperwork to accompany per policy

## 2019-02-11 NOTE — PROGRESS NOTES
LATE NOTE: In accordance with Medicare Guidelines, a copy of the Important Letter from Medicare was presented to the patient's wife in anticipation of expected discharge within 48 hours. An oral explanation was provided and all questions were answered. Patient's wife signed one copy of the Important Letter from Medicare which was placed in the patient's medical chart, and a copy of the Important Letter from Medicare was provided to the patient's wife. Care Managers were notified.

## 2019-02-11 NOTE — PROGRESS NOTES
TRANSFER - OUT REPORT: 
 
Verbal report given to [employer], RN on Justin Jacob  being transferred to [facility] rm 341 for routine progression of care Report consisted of patients Situation, Background, Assessment and Recommendations(SBAR). Information from the following report(s) SBAR, Kardex, Procedure Summary, MAR and Recent Results was reviewed with the receiving nurse. Opportunity for questions and clarification was provided. IVs & heart monitor removed.

## 2019-02-11 NOTE — PROGRESS NOTES
Bedside and Verbal shift change report given to self (oncoming nurse) by Dustin Miller RN (offgoing nurse). Report included the following information SBAR, Kardex, MAR and Recent Results.

## 2019-02-11 NOTE — PROGRESS NOTES
Spoke to [employer] regarding pts K 3.1 on am labs, orders received for 40meq po now & additional dose to be given at 3pm & repeat BMP on Wednesday. Transport put on call to take pt to [facility] later today.

## 2019-02-11 NOTE — PROGRESS NOTES
Problem: Falls - Risk of 
Goal: *Absence of Falls Document Cornelious Booty Fall Risk and appropriate interventions in the flowsheet. Outcome: Progressing Towards Goal 
Fall Risk Interventions: 
  
 
Mentation Interventions: Bed/chair exit alarm, Door open when patient unattended, More frequent rounding, Reorient patient Medication Interventions: Bed/chair exit alarm, Evaluate medications/consider consulting pharmacy Elimination Interventions: Bed/chair exit alarm, Call light in reach, Toileting schedule/hourly rounds Problem: Pressure Injury - Risk of 
Goal: *Prevention of pressure injury Document Adam Scale and appropriate interventions in the flowsheet. Outcome: Progressing Towards Goal 
Pressure Injury Interventions: 
Sensory Interventions: Assess changes in LOC, Avoid rigorous massage over bony prominences, Minimize linen layers, Pad between skin to skin Moisture Interventions: Absorbent underpads, Apply protective barrier, creams and emollients, Check for incontinence Q2 hours and as needed, Moisture barrier Activity Interventions: Assess need for specialty bed, Increase time out of bed, Pressure redistribution bed/mattress(bed type), PT/OT evaluation Mobility Interventions: HOB 30 degrees or less, Pressure redistribution bed/mattress (bed type), PT/OT evaluation, Turn and reposition approx. every two hours(pillow and wedges) Nutrition Interventions: Document food/fluid/supplement intake Friction and Shear Interventions: Apply protective barrier, creams and emollients, Foam dressings/transparent film/skin sealants, HOB 30 degrees or less, Transferring/repositioning devices

## 2019-02-11 NOTE — PROGRESS NOTES
Verbal bedside report given to Maritza Alejandro oncoming RN. Patient's situation, background, assessment and recommendations provided. Opportunity for questions provided. Oncoming RN assumed care of patient.

## 2019-03-21 ENCOUNTER — HOSPITAL ENCOUNTER (EMERGENCY)
Age: 84
Discharge: HOME | End: 2019-03-21
Attending: EMERGENCY MEDICINE
Payer: MEDICARE

## 2019-03-21 PROCEDURE — 75810000275 HC EMERGENCY DEPT VISIT NO LEVEL OF CARE: Performed by: EMERGENCY MEDICINE

## 2019-03-21 NOTE — ED NOTES
[facility] Staff called to state pt began to have right sided facial droop and right sided weakness since 1130. Hx of CVA but states his normal is just a slight weakness to the right side and normal no facial droop. States normal able to  pivot. Staff states pt on Elequis 2.5mg BID for hx of afibb. Pt also has pacemaker. Staff states pt has a hx of having TIA's but states, \"its not resolving this time\". State they are calling EMS.

## 2019-04-22 PROBLEM — R00.1 BRADYCARDIA: Status: RESOLVED | Noted: 2019-02-04 | Resolved: 2019-04-22

## 2019-06-10 PROBLEM — E53.8 B12 DEFICIENCY: Status: ACTIVE | Noted: 2019-06-10

## 2019-06-10 PROBLEM — I69.398 ABNORMALITY OF GAIT AS LATE EFFECT OF CEREBROVASCULAR ACCIDENT (CVA): Status: ACTIVE | Noted: 2019-06-10

## 2019-06-10 PROBLEM — R26.9 ABNORMALITY OF GAIT AS LATE EFFECT OF CEREBROVASCULAR ACCIDENT (CVA): Status: ACTIVE | Noted: 2019-06-10

## 2019-10-24 PROBLEM — E55.9 VITAMIN D DEFICIENCY: Status: ACTIVE | Noted: 2019-10-24

## 2020-03-15 ENCOUNTER — HOSPITAL ENCOUNTER (INPATIENT)
Age: 85
LOS: 3 days | Discharge: HOME HEALTH | DRG: 556 | End: 2020-03-18
Attending: EMERGENCY MEDICINE | Admitting: INTERNAL MEDICINE
Payer: MEDICARE

## 2020-03-15 ENCOUNTER — APPOINTMENT (OUTPATIENT)
Dept: CT IMAGING | Age: 85
DRG: 556 | End: 2020-03-15
Attending: EMERGENCY MEDICINE
Payer: MEDICARE

## 2020-03-15 ENCOUNTER — APPOINTMENT (OUTPATIENT)
Dept: GENERAL RADIOLOGY | Age: 85
DRG: 556 | End: 2020-03-15
Attending: EMERGENCY MEDICINE
Payer: MEDICARE

## 2020-03-15 DIAGNOSIS — S30.1XXA HEMATOMA OF RECTUS SHEATH, INITIAL ENCOUNTER: Primary | ICD-10-CM

## 2020-03-15 DIAGNOSIS — N39.0 ACUTE UTI: ICD-10-CM

## 2020-03-15 PROBLEM — T14.8XXA HEMATOMA: Status: ACTIVE | Noted: 2020-03-15

## 2020-03-15 LAB
ALBUMIN SERPL-MCNC: 2.9 G/DL (ref 3.2–4.6)
ALBUMIN/GLOB SERPL: 0.7 {RATIO} (ref 1.2–3.5)
ALP SERPL-CCNC: 81 U/L (ref 50–136)
ALT SERPL-CCNC: 28 U/L (ref 12–65)
ANION GAP SERPL CALC-SCNC: 6 MMOL/L (ref 7–16)
APPEARANCE UR: ABNORMAL
APTT PPP: 37.5 SEC (ref 24.3–35.4)
AST SERPL-CCNC: 16 U/L (ref 15–37)
ATRIAL RATE: 85 BPM
BACTERIA URNS QL MICRO: ABNORMAL /HPF
BASOPHILS # BLD: 0 K/UL (ref 0–0.2)
BASOPHILS NFR BLD: 0 % (ref 0–2)
BILIRUB SERPL-MCNC: 0.4 MG/DL (ref 0.2–1.1)
BILIRUB UR QL: NEGATIVE
BNP SERPL-MCNC: 557 PG/ML
BUN SERPL-MCNC: 24 MG/DL (ref 8–23)
CALCIUM SERPL-MCNC: 8.5 MG/DL (ref 8.3–10.4)
CALCULATED P AXIS, ECG09: 61 DEGREES
CALCULATED R AXIS, ECG10: -90 DEGREES
CALCULATED T AXIS, ECG11: 81 DEGREES
CHLORIDE SERPL-SCNC: 101 MMOL/L (ref 98–107)
CO2 SERPL-SCNC: 31 MMOL/L (ref 21–32)
COLOR UR: YELLOW
CREAT SERPL-MCNC: 1.75 MG/DL (ref 0.8–1.5)
DIAGNOSIS, 93000: NORMAL
DIFFERENTIAL METHOD BLD: ABNORMAL
EOSINOPHIL # BLD: 0.2 K/UL (ref 0–0.8)
EOSINOPHIL NFR BLD: 2 % (ref 0.5–7.8)
ERYTHROCYTE [DISTWIDTH] IN BLOOD BY AUTOMATED COUNT: 14 % (ref 11.9–14.6)
FLUAV AG NPH QL IA: NEGATIVE
FLUBV AG NPH QL IA: NEGATIVE
GLOBULIN SER CALC-MCNC: 4.3 G/DL (ref 2.3–3.5)
GLUCOSE SERPL-MCNC: 132 MG/DL (ref 65–100)
GLUCOSE UR STRIP.AUTO-MCNC: NEGATIVE MG/DL
HCT VFR BLD AUTO: 34.4 % (ref 41.1–50.3)
HGB BLD-MCNC: 11 G/DL (ref 13.6–17.2)
HGB UR QL STRIP: ABNORMAL
IMM GRANULOCYTES # BLD AUTO: 0.1 K/UL (ref 0–0.5)
IMM GRANULOCYTES NFR BLD AUTO: 1 % (ref 0–5)
INR PPP: 1.3
KETONES UR QL STRIP.AUTO: NEGATIVE MG/DL
LACTATE SERPL-SCNC: 2.2 MMOL/L (ref 0.4–2)
LEUKOCYTE ESTERASE UR QL STRIP.AUTO: ABNORMAL
LIPASE SERPL-CCNC: 94 U/L (ref 73–393)
LYMPHOCYTES # BLD: 2.6 K/UL (ref 0.5–4.6)
LYMPHOCYTES NFR BLD: 20 % (ref 13–44)
MCH RBC QN AUTO: 26.8 PG (ref 26.1–32.9)
MCHC RBC AUTO-ENTMCNC: 32 G/DL (ref 31.4–35)
MCV RBC AUTO: 83.7 FL (ref 79.6–97.8)
MONOCYTES # BLD: 1.1 K/UL (ref 0.1–1.3)
MONOCYTES NFR BLD: 8 % (ref 4–12)
NEUTS SEG # BLD: 9 K/UL (ref 1.7–8.2)
NEUTS SEG NFR BLD: 69 % (ref 43–78)
NITRITE UR QL STRIP.AUTO: NEGATIVE
NRBC # BLD: 0 K/UL (ref 0–0.2)
OTHER OBSERVATIONS,UCOM: ABNORMAL
P-R INTERVAL, ECG05: 172 MS
PH UR STRIP: 6 [PH] (ref 5–9)
PLATELET # BLD AUTO: 287 K/UL (ref 150–450)
PMV BLD AUTO: 10.8 FL (ref 9.4–12.3)
POTASSIUM SERPL-SCNC: 3.6 MMOL/L (ref 3.5–5.1)
PROCALCITONIN SERPL-MCNC: 0.09 NG/ML
PROT SERPL-MCNC: 7.2 G/DL (ref 6.3–8.2)
PROT UR STRIP-MCNC: 100 MG/DL
PROTHROMBIN TIME: 16.6 SEC (ref 12–14.7)
Q-T INTERVAL, ECG07: 422 MS
QRS DURATION, ECG06: 156 MS
QTC CALCULATION (BEZET), ECG08: 502 MS
RBC # BLD AUTO: 4.11 M/UL (ref 4.23–5.6)
RBC #/AREA URNS HPF: >100 /HPF
SODIUM SERPL-SCNC: 138 MMOL/L (ref 136–145)
SP GR UR REFRACTOMETRY: 1.01 (ref 1–1.02)
SPECIMEN SOURCE: NORMAL
TROPONIN I SERPL-MCNC: <0.02 NG/ML (ref 0.02–0.05)
UROBILINOGEN UR QL STRIP.AUTO: 0.2 EU/DL (ref 0.2–1)
VENTRICULAR RATE, ECG03: 85 BPM
WBC # BLD AUTO: 12.9 K/UL (ref 4.3–11.1)
WBC URNS QL MICRO: >100 /HPF

## 2020-03-15 PROCEDURE — 85025 COMPLETE CBC W/AUTO DIFF WBC: CPT

## 2020-03-15 PROCEDURE — 71045 X-RAY EXAM CHEST 1 VIEW: CPT

## 2020-03-15 PROCEDURE — 83880 ASSAY OF NATRIURETIC PEPTIDE: CPT

## 2020-03-15 PROCEDURE — 84145 PROCALCITONIN (PCT): CPT

## 2020-03-15 PROCEDURE — 74177 CT ABD & PELVIS W/CONTRAST: CPT

## 2020-03-15 PROCEDURE — 87804 INFLUENZA ASSAY W/OPTIC: CPT

## 2020-03-15 PROCEDURE — 65270000029 HC RM PRIVATE

## 2020-03-15 PROCEDURE — 83690 ASSAY OF LIPASE: CPT

## 2020-03-15 PROCEDURE — 85610 PROTHROMBIN TIME: CPT

## 2020-03-15 PROCEDURE — 85730 THROMBOPLASTIN TIME PARTIAL: CPT

## 2020-03-15 PROCEDURE — 96365 THER/PROPH/DIAG IV INF INIT: CPT

## 2020-03-15 PROCEDURE — 81001 URINALYSIS AUTO W/SCOPE: CPT

## 2020-03-15 PROCEDURE — 84484 ASSAY OF TROPONIN QUANT: CPT

## 2020-03-15 PROCEDURE — 74011636320 HC RX REV CODE- 636/320: Performed by: EMERGENCY MEDICINE

## 2020-03-15 PROCEDURE — 99285 EMERGENCY DEPT VISIT HI MDM: CPT

## 2020-03-15 PROCEDURE — 74011250636 HC RX REV CODE- 250/636: Performed by: EMERGENCY MEDICINE

## 2020-03-15 PROCEDURE — 87186 SC STD MICRODIL/AGAR DIL: CPT

## 2020-03-15 PROCEDURE — 36415 COLL VENOUS BLD VENIPUNCTURE: CPT

## 2020-03-15 PROCEDURE — 83605 ASSAY OF LACTIC ACID: CPT

## 2020-03-15 PROCEDURE — 93005 ELECTROCARDIOGRAM TRACING: CPT | Performed by: EMERGENCY MEDICINE

## 2020-03-15 PROCEDURE — 80053 COMPREHEN METABOLIC PANEL: CPT

## 2020-03-15 PROCEDURE — 87040 BLOOD CULTURE FOR BACTERIA: CPT

## 2020-03-15 PROCEDURE — 87086 URINE CULTURE/COLONY COUNT: CPT

## 2020-03-15 PROCEDURE — 87088 URINE BACTERIA CULTURE: CPT

## 2020-03-15 PROCEDURE — 74011000258 HC RX REV CODE- 258: Performed by: EMERGENCY MEDICINE

## 2020-03-15 RX ORDER — PANTOPRAZOLE SODIUM 40 MG/1
40 TABLET, DELAYED RELEASE ORAL
Status: DISCONTINUED | OUTPATIENT
Start: 2020-03-16 | End: 2020-03-18 | Stop reason: HOSPADM

## 2020-03-15 RX ORDER — BISACODYL 5 MG
5 TABLET, DELAYED RELEASE (ENTERIC COATED) ORAL DAILY PRN
Status: DISCONTINUED | OUTPATIENT
Start: 2020-03-15 | End: 2020-03-18 | Stop reason: HOSPADM

## 2020-03-15 RX ORDER — LOSARTAN POTASSIUM 50 MG/1
100 TABLET ORAL DAILY
Status: DISCONTINUED | OUTPATIENT
Start: 2020-03-16 | End: 2020-03-18 | Stop reason: HOSPADM

## 2020-03-15 RX ORDER — SODIUM CHLORIDE 0.9 % (FLUSH) 0.9 %
5-40 SYRINGE (ML) INJECTION EVERY 8 HOURS
Status: DISCONTINUED | OUTPATIENT
Start: 2020-03-15 | End: 2020-03-18 | Stop reason: HOSPADM

## 2020-03-15 RX ORDER — ONDANSETRON 2 MG/ML
4 INJECTION INTRAMUSCULAR; INTRAVENOUS
Status: DISCONTINUED | OUTPATIENT
Start: 2020-03-15 | End: 2020-03-18 | Stop reason: HOSPADM

## 2020-03-15 RX ORDER — COLCHICINE 0.6 MG/1
0.6 TABLET ORAL ONCE
Status: COMPLETED | OUTPATIENT
Start: 2020-03-15 | End: 2020-03-16

## 2020-03-15 RX ORDER — AMLODIPINE BESYLATE 5 MG/1
5 TABLET ORAL DAILY
Status: DISCONTINUED | OUTPATIENT
Start: 2020-03-16 | End: 2020-03-18 | Stop reason: HOSPADM

## 2020-03-15 RX ORDER — HYDROCODONE BITARTRATE AND ACETAMINOPHEN 5; 325 MG/1; MG/1
1 TABLET ORAL
Status: DISCONTINUED | OUTPATIENT
Start: 2020-03-15 | End: 2020-03-18 | Stop reason: HOSPADM

## 2020-03-15 RX ORDER — ESCITALOPRAM OXALATE 10 MG/1
10 TABLET ORAL DAILY
Status: DISCONTINUED | OUTPATIENT
Start: 2020-03-16 | End: 2020-03-18 | Stop reason: HOSPADM

## 2020-03-15 RX ORDER — SODIUM CHLORIDE 0.9 % (FLUSH) 0.9 %
10 SYRINGE (ML) INJECTION
Status: COMPLETED | OUTPATIENT
Start: 2020-03-15 | End: 2020-03-15

## 2020-03-15 RX ORDER — SODIUM CHLORIDE 0.9 % (FLUSH) 0.9 %
5-40 SYRINGE (ML) INJECTION AS NEEDED
Status: DISCONTINUED | OUTPATIENT
Start: 2020-03-15 | End: 2020-03-18 | Stop reason: HOSPADM

## 2020-03-15 RX ORDER — HYDROCHLOROTHIAZIDE 25 MG/1
12.5 TABLET ORAL DAILY
Status: DISCONTINUED | OUTPATIENT
Start: 2020-03-16 | End: 2020-03-18 | Stop reason: HOSPADM

## 2020-03-15 RX ORDER — LANOLIN ALCOHOL/MO/W.PET/CERES
400 CREAM (GRAM) TOPICAL DAILY
Status: DISCONTINUED | OUTPATIENT
Start: 2020-03-16 | End: 2020-03-18 | Stop reason: HOSPADM

## 2020-03-15 RX ORDER — ACETAMINOPHEN 325 MG/1
650 TABLET ORAL
Status: DISCONTINUED | OUTPATIENT
Start: 2020-03-15 | End: 2020-03-18 | Stop reason: HOSPADM

## 2020-03-15 RX ADMIN — SODIUM CHLORIDE 500 ML: 900 INJECTION, SOLUTION INTRAVENOUS at 17:57

## 2020-03-15 RX ADMIN — CEFTRIAXONE 1 G: 1 INJECTION, POWDER, FOR SOLUTION INTRAMUSCULAR; INTRAVENOUS at 18:50

## 2020-03-15 RX ADMIN — IOPAMIDOL 100 ML: 755 INJECTION, SOLUTION INTRAVENOUS at 19:41

## 2020-03-15 RX ADMIN — Medication 10 ML: at 19:41

## 2020-03-15 RX ADMIN — SODIUM CHLORIDE 100 ML: 900 INJECTION, SOLUTION INTRAVENOUS at 19:41

## 2020-03-15 NOTE — ED TRIAGE NOTES
Pt arrives via EMS from home with c/o LLQ pain and left side back pain. T 100. 6.  pt reports abdominal pain began yesterday tender to touch

## 2020-03-15 NOTE — ED PROVIDER NOTES
80-year-old male with history of CAD, CVA, reticulitis presents with complaint of worsening left lower quadrant abdominal pain over the past day. According to EMS patient with temperature of 100.6 F. Reports constipation. Denies nausea, vomiting, dysuria, hematuria, melena, hematochezia, chest pain. Patient on 2 L O2 via nasal cannula at baseline. Mild nonproductive cough. Uncertain recent sick contacts. The history is provided by the patient. No  was used. Abdominal Pain    This is a new problem. The current episode started 12 to 24 hours ago. The problem occurs constantly. The problem has not changed since onset. The pain is located in the LLQ. The quality of the pain is cramping and sharp. The pain is at a severity of 3/10. The pain is mild. Associated symptoms include a fever, constipation and back pain. Pertinent negatives include no anorexia, no belching, no diarrhea, no flatus, no hematochezia, no melena, no nausea, no vomiting, no dysuria, no frequency, no hematuria, no headaches, no arthralgias, no myalgias, no trauma and no chest pain. Back Pain    Associated symptoms include a fever and abdominal pain. Pertinent negatives include no chest pain, no headaches, no dysuria and no weakness. Past Medical History:   Diagnosis Date    Arthritis     Asthma     as a kid. none recent    Atrial flutter with rapid ventricular response ([address] 6/18/2014    1. Diagnosed 6/18/14: s/p ablation. 2. Echo (6/19/14): EF 45-50%. Moderate mitral regurgitation. 3. Ecardio (8/12-8/25/14): No atrial arrhythmias.      Basal cell carcinoma of skin of other and unspecified parts of face 2/20/2014    Benign hypertensive heart disease without heart failure 9/29/2015    BPH (benign prostatic hyperplasia) 4/16/2013    CAD (coronary artery disease)     CABG, cardiac cath , stents times 2    Calculus of kidney 2/20/2014    Cancer [facility])     Carotid artery stenosis with cerebral infarction [facility] 9/29/2015    Followed by Dr. Dana Mott.  Chest pain, unspecified 12/11/2013    CVA (cerebral vascular accident) ([facility] 4/16/2013    Diarrhea following gastrointestinal surgery 4/16/2013    Diverticulitis colectomy    Dizziness 6/18/2014    Generalized weakness     GERD (gastroesophageal reflux disease)     Gout 4/16/2013    Heart disease, unspecified 2/20/2014    History of basal cell cancer nose    History of CVA (cerebrovascular accident) 6/18/2014    History of mini-strokes.      History of kidney stones 2006    Hyperlipidemia 4/16/2013    Hypertension     Hypertrophy of prostate with urinary obstruction and other lower urinary tract symptoms (LUTS) 2/20/2014    Hypothyroidism 4/16/2013    Incomplete bladder emptying 2/20/2014    Iron deficiency anemia 4/16/2013    NSTEMI (non-ST elevated myocardial infarction) ([facility] 12/12/2013    Osteoarthritis 4/16/2013    Platelet inhibition due to Plavix     Pure hypercholesterolemia 2/20/2014    Stroke [facility])     x3 with the last one being 2/12    Unspecified adverse effect of anesthesia     state he woke up during heart surgery    Unspecified asthma 2/20/2014    Unspecified disorder of thyroid 2/20/2014    Urinary frequency 2/20/2014    Urinary tract infection, site not specified 11/20/2013       Past Surgical History:   Procedure Laterality Date    CABG, ARTERY-VEIN, FOUR  2005    HX ADENOIDECTOMY      HX APPENDECTOMY  age 15   [place] HX CAROTID ENDARTERECTOMY Right 2011    HX CATARACT REMOVAL Bilateral 2006    HX CHOLECYSTECTOMY      HX CORONARY STENT PLACEMENT      HX OTHER SURGICAL  3/12/14    Plasma Button procedure of prostate    HX PACEMAKER PLACEMENT  02/06/2019    Dr. Carina Stewart  12/12/2013    bmsx2 svg to !st obtuse marginal    REMOVAL OF KIDNEY STONE  05/2016    TOTAL KNEE ARTHROPLASTY  2007    right    TOTAL KNEE ARTHROPLASTY 2012    left         Family History:   Problem Relation Age of Onset    Heart Disease Father     Heart Attack Father     Stroke Sister     Cancer Sister         brain    Heart Disease Mother     Bleeding Prob Mother 80        breast cancer       Social History     Socioeconomic History    Marital status:      Spouse name: Not on file    Number of children: Not on file    Years of education: Not on file    Highest education level: Not on file   Occupational History    Not on file   Social Needs    Financial resource strain: Not on file    Food insecurity     Worry: Not on file     Inability: Not on file    Transportation needs     Medical: Not on file     Non-medical: Not on file   Tobacco Use    Smoking status: Former Smoker     Types: Cigars     Last attempt to quit: 2002     Years since quittin.2    Smokeless tobacco: Never Used    Tobacco comment: quit - cigars   Substance and Sexual Activity    Alcohol use: No     Alcohol/week: 0.0 standard drinks     Comment: rare    Drug use: No    Sexual activity: Not Currently   Lifestyle    Physical activity     Days per week: Not on file     Minutes per session: Not on file    Stress: Not on file   Relationships    Social connections     Talks on phone: Not on file     Gets together: Not on file     Attends Yazidism service: Not on file     Active member of club or organization: Not on file     Attends meetings of clubs or organizations: Not on file     Relationship status: Not on file    Intimate partner violence     Fear of current or ex partner: Not on file     Emotionally abused: Not on file     Physically abused: Not on file     Forced sexual activity: Not on file   Other Topics Concern    Not on file   Social History Narrative    Not on file         ALLERGIES: Other medication    Review of Systems   Constitutional: Positive for fever. Negative for chills and fatigue.    HENT: Negative for congestion, rhinorrhea and sore throat. Respiratory: Positive for cough. Negative for shortness of breath, wheezing and stridor. Cardiovascular: Negative for chest pain, palpitations and leg swelling. Gastrointestinal: Positive for abdominal pain and constipation. Negative for anorexia, diarrhea, flatus, hematochezia, melena, nausea and vomiting. Genitourinary: Negative for dysuria, flank pain, frequency and hematuria. Musculoskeletal: Positive for back pain. Negative for arthralgias and myalgias. Skin: Negative for rash and wound. Neurological: Negative for dizziness, syncope, weakness, light-headedness and headaches. Vitals:    03/15/20 1613   BP: 142/73   Pulse: 88   Temp: 98.5 °F (36.9 °C)   SpO2: 99%            Physical Exam  Vitals signs and nursing note reviewed. Constitutional:       Comments: Chronically ill-appearing. HENT:      Head: Normocephalic. Eyes:      Extraocular Movements: Extraocular movements intact. Pupils: Pupils are equal, round, and reactive to light. Cardiovascular:      Rate and Rhythm: Normal rate and regular rhythm. Heart sounds: Normal heart sounds. Pulmonary:      Comments: Coarse breath sounds present bilaterally. Abdominal:      Tenderness: There is abdominal tenderness in the left lower quadrant. Comments: Slightly distended abdomen. Moderate left lower quadrant tenderness palpation. No pulsatile abdominal mass noted. Musculoskeletal:      Comments: No LE edema. No calf TTP. Neurological:      General: No focal deficit present. Mental Status: He is alert and oriented to person, place, and time. MDM  Number of Diagnoses or Management Options  Acute UTI: new and requires workup  Hematoma of rectus sheath, initial encounter: new and requires workup  Diagnosis management comments: UA with evidence of UTI. Order placed for Rocephin 1 g IV. Urine culture placed.   CT abdomen and pelvis with evidence of left rectus sheath hematoma measuring 4.5 x 6.0 cm.  General surgery consulted. Will apply abdominal binder. Hospitalist consulted for admission. Amount and/or Complexity of Data Reviewed  Clinical lab tests: ordered and reviewed  Tests in the radiology section of CPT®: ordered and reviewed  Tests in the medicine section of CPT®: ordered and reviewed  Review and summarize past medical records: yes  Discuss the patient with other providers: yes  Independent visualization of images, tracings, or specimens: yes    Risk of Complications, Morbidity, and/or Mortality  Presenting problems: moderate  Diagnostic procedures: moderate  Management options: moderate    Patient Progress  Patient progress: stable    ED Course as of Mar 15 2032   Sun Mar 15, 2020   1843 CXR IMPRESSION:  NO ACUTE CARDIOPULMONARY DISEASE IDENTIFIED. [DF]   2029 CT abd/pelvis IMPRESSION:     1. Left rectus sheath hematoma, measuring up to 4.5 x 6.0 cm.     2. Bilateral nonobstructing renal calculi. Chronic dilatation of the left renal  collecting system. Findings are similar to 2017 study.     3. Mild urinary bladder wall thickening, likely secondary to bladder outlet  obstruction. Prostate gland mildly enlarged.     4. Negative for colitis, diverticulitis or bowel obstruction.     [DF]      ED Course User Index  [DF] Bryant De Dios MD       Procedures          Results Include:    Recent Results (from the past 24 hour(s))   EKG, 12 LEAD, INITIAL    Collection Time: 03/15/20  4:21 PM   Result Value Ref Range    Ventricular Rate 85 BPM    Atrial Rate 85 BPM    P-R Interval 172 ms    QRS Duration 156 ms    Q-T Interval 422 ms    QTC Calculation (Bezet) 502 ms    Calculated P Axis 61 degrees    Calculated R Axis -90 degrees    Calculated T Axis 81 degrees    Diagnosis       Electronic ventricular pacemaker  Confirmed by Uyen Nichols ([id number]) on 3/15/2020 5:31:41 PM     CBC WITH AUTOMATED DIFF    Collection Time: 03/15/20  4:40 PM   Result Value Ref Range    WBC 12.9 (H) 4.3 - 11.1 K/uL    RBC 4.11 (L) 4.23 - 5.6 M/uL    HGB 11.0 (L) 13.6 - 17.2 g/dL    HCT 34.4 (L) 41.1 - 50.3 %    MCV 83.7 79.6 - 97.8 FL    MCH 26.8 26.1 - 32.9 PG    MCHC 32.0 31.4 - 35.0 g/dL    RDW 14.0 11.9 - 14.6 %    PLATELET 453 700 - 420 K/uL    MPV 10.8 9.4 - 12.3 FL    ABSOLUTE NRBC 0.00 0.0 - 0.2 K/uL    DF AUTOMATED      NEUTROPHILS 69 43 - 78 %    LYMPHOCYTES 20 13 - 44 %    MONOCYTES 8 4.0 - 12.0 %    EOSINOPHILS 2 0.5 - 7.8 %    BASOPHILS 0 0.0 - 2.0 %    IMMATURE GRANULOCYTES 1 0.0 - 5.0 %    ABS. NEUTROPHILS 9.0 (H) 1.7 - 8.2 K/UL    ABS. LYMPHOCYTES 2.6 0.5 - 4.6 K/UL    ABS. MONOCYTES 1.1 0.1 - 1.3 K/UL    ABS. EOSINOPHILS 0.2 0.0 - 0.8 K/UL    ABS. BASOPHILS 0.0 0.0 - 0.2 K/UL    ABS. IMM. GRANS. 0.1 0.0 - 0.5 K/UL   METABOLIC PANEL, COMPREHENSIVE    Collection Time: 03/15/20  4:40 PM   Result Value Ref Range    Sodium 138 136 - 145 mmol/L    Potassium 3.6 3.5 - 5.1 mmol/L    Chloride 101 98 - 107 mmol/L    CO2 31 21 - 32 mmol/L    Anion gap 6 (L) 7 - 16 mmol/L    Glucose 132 (H) 65 - 100 mg/dL    BUN 24 (H) 8 - 23 MG/DL    Creatinine 1.75 (H) 0.8 - 1.5 MG/DL    GFR est AA 48 (L) >60 ml/min/1.73m2    GFR est non-AA 40 (L) >60 ml/min/1.73m2    Calcium 8.5 8.3 - 10.4 MG/DL    Bilirubin, total 0.4 0.2 - 1.1 MG/DL    ALT (SGPT) 28 12 - 65 U/L    AST (SGOT) 16 15 - 37 U/L    Alk.  phosphatase 81 50 - 136 U/L    Protein, total 7.2 6.3 - 8.2 g/dL    Albumin 2.9 (L) 3.2 - 4.6 g/dL    Globulin 4.3 (H) 2.3 - 3.5 g/dL    A-G Ratio 0.7 (L) 1.2 - 3.5     LIPASE    Collection Time: 03/15/20  4:40 PM   Result Value Ref Range    Lipase 94 73 - 393 U/L   LACTIC ACID    Collection Time: 03/15/20  4:40 PM   Result Value Ref Range    Lactic acid 2.2 (HH) 0.4 - 2.0 MMOL/L   PROCALCITONIN    Collection Time: 03/15/20  4:40 PM   Result Value Ref Range    Procalcitonin 0.09 ng/mL   NT-PRO BNP    Collection Time: 03/15/20  4:40 PM   Result Value Ref Range    NT pro- (H) <450 PG/ML   TROPONIN I    Collection Time: 03/15/20  4:40 PM   Result Value Ref Range    Troponin-I, Qt. <0.02 (L) 0.02 - 0.05 NG/ML   INFLUENZA A & B AG (RAPID TEST)    Collection Time: 03/15/20  4:40 PM   Result Value Ref Range    Influenza A Ag NEGATIVE  NEG      Influenza B Ag NEGATIVE  NEG      Source Nasopharyngeal     URINALYSIS W/ RFLX MICROSCOPIC    Collection Time: 03/15/20  5:27 PM   Result Value Ref Range    Color YELLOW      Appearance TURBID      Specific gravity 1.012 1.001 - 1.023      pH (UA) 6.0 5.0 - 9.0      Protein 100 (A) NEG mg/dL    Glucose NEGATIVE  mg/dL    Ketone NEGATIVE  NEG mg/dL    Bilirubin NEGATIVE  NEG      Blood LARGE (A) NEG      Urobilinogen 0.2 0.2 - 1.0 EU/dL    Nitrites NEGATIVE  NEG      Leukocyte Esterase LARGE (A) NEG      WBC >100 0 /hpf    RBC >100 0 /hpf    Bacteria 4+ (H) 0 /hpf    Other observations RESULTS VERIFIED MANUALLY                Vern Yadav MD; 3/15/2020 @4:21 PM Voice dictation software was used during the making of this note. This software is not perfect and grammatical and other typographical errors may be present.   This note has not been proofread for errors.  ===================================================================

## 2020-03-16 LAB
ANION GAP SERPL CALC-SCNC: 8 MMOL/L (ref 7–16)
BASOPHILS # BLD: 0.1 K/UL (ref 0–0.2)
BASOPHILS NFR BLD: 0 % (ref 0–2)
BUN SERPL-MCNC: 26 MG/DL (ref 8–23)
CALCIUM SERPL-MCNC: 8.4 MG/DL (ref 8.3–10.4)
CHLORIDE SERPL-SCNC: 103 MMOL/L (ref 98–107)
CO2 SERPL-SCNC: 27 MMOL/L (ref 21–32)
CREAT SERPL-MCNC: 1.81 MG/DL (ref 0.8–1.5)
DIFFERENTIAL METHOD BLD: ABNORMAL
EOSINOPHIL # BLD: 0.2 K/UL (ref 0–0.8)
EOSINOPHIL NFR BLD: 1 % (ref 0.5–7.8)
ERYTHROCYTE [DISTWIDTH] IN BLOOD BY AUTOMATED COUNT: 14.4 % (ref 11.9–14.6)
GLUCOSE SERPL-MCNC: 116 MG/DL (ref 65–100)
HCT VFR BLD AUTO: 32 % (ref 41.1–50.3)
HGB BLD-MCNC: 10 G/DL (ref 13.6–17.2)
HGB BLD-MCNC: 10.3 G/DL (ref 13.6–17.2)
IMM GRANULOCYTES # BLD AUTO: 0.1 K/UL (ref 0–0.5)
IMM GRANULOCYTES NFR BLD AUTO: 1 % (ref 0–5)
LYMPHOCYTES # BLD: 3.3 K/UL (ref 0.5–4.6)
LYMPHOCYTES NFR BLD: 25 % (ref 13–44)
MCH RBC QN AUTO: 27 PG (ref 26.1–32.9)
MCHC RBC AUTO-ENTMCNC: 32.2 G/DL (ref 31.4–35)
MCV RBC AUTO: 83.8 FL (ref 79.6–97.8)
MONOCYTES # BLD: 1.3 K/UL (ref 0.1–1.3)
MONOCYTES NFR BLD: 10 % (ref 4–12)
NEUTS SEG # BLD: 8.5 K/UL (ref 1.7–8.2)
NEUTS SEG NFR BLD: 63 % (ref 43–78)
NRBC # BLD: 0 K/UL (ref 0–0.2)
PLATELET # BLD AUTO: 274 K/UL (ref 150–450)
PMV BLD AUTO: 11.2 FL (ref 9.4–12.3)
POTASSIUM SERPL-SCNC: 3.3 MMOL/L (ref 3.5–5.1)
RBC # BLD AUTO: 3.82 M/UL (ref 4.23–5.6)
SODIUM SERPL-SCNC: 138 MMOL/L (ref 136–145)
WBC # BLD AUTO: 13.4 K/UL (ref 4.3–11.1)

## 2020-03-16 PROCEDURE — 80048 BASIC METABOLIC PNL TOTAL CA: CPT

## 2020-03-16 PROCEDURE — 85018 HEMOGLOBIN: CPT

## 2020-03-16 PROCEDURE — 74011250636 HC RX REV CODE- 250/636: Performed by: EMERGENCY MEDICINE

## 2020-03-16 PROCEDURE — 36415 COLL VENOUS BLD VENIPUNCTURE: CPT

## 2020-03-16 PROCEDURE — 77030040393 HC DRSG OPTIFOAM GENT MDII -B

## 2020-03-16 PROCEDURE — 74011250636 HC RX REV CODE- 250/636: Performed by: INTERNAL MEDICINE

## 2020-03-16 PROCEDURE — 74011250637 HC RX REV CODE- 250/637: Performed by: INTERNAL MEDICINE

## 2020-03-16 PROCEDURE — 85025 COMPLETE CBC W/AUTO DIFF WBC: CPT

## 2020-03-16 PROCEDURE — 65270000029 HC RM PRIVATE

## 2020-03-16 PROCEDURE — 74011000258 HC RX REV CODE- 258: Performed by: INTERNAL MEDICINE

## 2020-03-16 RX ADMIN — CEFTRIAXONE 1 G: 1 INJECTION, POWDER, FOR SOLUTION INTRAMUSCULAR; INTRAVENOUS at 17:03

## 2020-03-16 RX ADMIN — LEVOTHYROXINE SODIUM 137 MCG: 0.11 TABLET ORAL at 05:47

## 2020-03-16 RX ADMIN — Medication 10 ML: at 13:20

## 2020-03-16 RX ADMIN — Medication 10 ML: at 04:15

## 2020-03-16 RX ADMIN — PANTOPRAZOLE SODIUM 40 MG: 40 TABLET, DELAYED RELEASE ORAL at 05:47

## 2020-03-16 RX ADMIN — ACETAMINOPHEN 650 MG: 325 TABLET, FILM COATED ORAL at 21:44

## 2020-03-16 RX ADMIN — Medication 400 MG: at 08:41

## 2020-03-16 RX ADMIN — Medication 10 ML: at 21:42

## 2020-03-16 RX ADMIN — ESCITALOPRAM OXALATE 10 MG: 10 TABLET ORAL at 08:41

## 2020-03-16 RX ADMIN — COLCHICINE 0.6 MG: 0.6 TABLET, FILM COATED ORAL at 04:10

## 2020-03-16 RX ADMIN — HYDROCHLOROTHIAZIDE 12.5 MG: 25 TABLET ORAL at 08:40

## 2020-03-16 RX ADMIN — Medication 10 ML: at 05:50

## 2020-03-16 RX ADMIN — SODIUM CHLORIDE 1000 ML: 900 INJECTION, SOLUTION INTRAVENOUS at 04:11

## 2020-03-16 NOTE — PROGRESS NOTES
Interdisciplinary Rounds completed. Nursing, Case Management, and Physician  present. Plan of care reviewed and updated.     Continue to monitor HGB

## 2020-03-16 NOTE — PROGRESS NOTES
Hospitalist Note     Admit Date:  3/15/2020  4:10 PM   Name:  Donavon Aguilar   Age:  80 y.o.  :  1935   MRN:  [id number]   PCP:  Cayetano Hodgkins, MD  Treatment Team: Attending Provider: Ivan Morales MD; Primary Nurse: Patricia Kraus; Utilization Review: Lolita Jannie    HPI/Subjective:   Patient is seen and examined at bedside. Patient is AAOx2-3 and slow to respond. Denies any pain at this time. Objective:     Patient Vitals for the past 24 hrs:   Temp Pulse Resp BP SpO2   20 0839 98 °F (36.7 °C) 81 18 115/59 95 %   20 0450 98.6 °F (37 °C) 86 18 123/66 97 %   20 0118 98.6 °F (37 °C) 88 18 115/64 98 %   03/15/20 2221  87  120/61    03/15/20 2021  82 28 140/63 100 %   03/15/20 1922  85 19 131/66 96 %   03/15/20 1645     99 %   03/15/20 1613 98.5 °F (36.9 °C) 88  142/73 99 %     Oxygen Therapy  O2 Sat (%): 95 % (20 0839)  Pulse via Oximetry: 82 beats per minute (03/15/20 2021)  O2 Device: Nasal cannula (20)  O2 Flow Rate (L/min): 2 l/min (20)    Estimated body mass index is 27.84 kg/m² as calculated from the following:    Height as of this encounter: 6' 1\" (1.854 m). Weight as of this encounter: 95.7 kg (211 lb). Intake/Output Summary (Last 24 hours) at 3/16/2020 1148  Last data filed at 3/15/2020 2000  Gross per 24 hour   Intake 650 ml   Output    Net 650 ml       *Note that automatically entered I/Os may not be accurate; dependent on patient compliance with collection and accurate  by techs. Physical Exam:   General:     alert, awake, no acute distress. Obese  Head:   normocephalic, atraumatic  Eyes, Ears, nose: PERRL, EOMI. Neck:    supple, non-tender  Lungs:   CTAB, no wheezing, rhonchi, rales  Cardiac:   Regular rate, Normal S1 and S2. Abdomen:   Soft, distended, nontender, +BS, no guarding/rebound  Extremities:   Warm, dry.  No edema  Skin:   No rashes, no jaundice  Neuro:  AAOx3  Psychiatric:  No anxiety, calm, cooperative    Data Review:  I have reviewed all labs, meds, and studies from the last 24 hours:    Recent Results (from the past 24 hour(s))   EKG, 12 LEAD, INITIAL    Collection Time: 03/15/20  4:21 PM   Result Value Ref Range    Ventricular Rate 85 BPM    Atrial Rate 85 BPM    P-R Interval 172 ms    QRS Duration 156 ms    Q-T Interval 422 ms    QTC Calculation (Bezet) 502 ms    Calculated P Axis 61 degrees    Calculated R Axis -90 degrees    Calculated T Axis 81 degrees    Diagnosis       Electronic ventricular pacemaker  Confirmed by Sameera Quiñonez ([id number]) on 3/15/2020 5:31:41 PM     CBC WITH AUTOMATED DIFF    Collection Time: 03/15/20  4:40 PM   Result Value Ref Range    WBC 12.9 (H) 4.3 - 11.1 K/uL    RBC 4.11 (L) 4.23 - 5.6 M/uL    HGB 11.0 (L) 13.6 - 17.2 g/dL    HCT 34.4 (L) 41.1 - 50.3 %    MCV 83.7 79.6 - 97.8 FL    MCH 26.8 26.1 - 32.9 PG    MCHC 32.0 31.4 - 35.0 g/dL    RDW 14.0 11.9 - 14.6 %    PLATELET 311 027 - 685 K/uL    MPV 10.8 9.4 - 12.3 FL    ABSOLUTE NRBC 0.00 0.0 - 0.2 K/uL    DF AUTOMATED      NEUTROPHILS 69 43 - 78 %    LYMPHOCYTES 20 13 - 44 %    MONOCYTES 8 4.0 - 12.0 %    EOSINOPHILS 2 0.5 - 7.8 %    BASOPHILS 0 0.0 - 2.0 %    IMMATURE GRANULOCYTES 1 0.0 - 5.0 %    ABS. NEUTROPHILS 9.0 (H) 1.7 - 8.2 K/UL    ABS. LYMPHOCYTES 2.6 0.5 - 4.6 K/UL    ABS. MONOCYTES 1.1 0.1 - 1.3 K/UL    ABS. EOSINOPHILS 0.2 0.0 - 0.8 K/UL    ABS. BASOPHILS 0.0 0.0 - 0.2 K/UL    ABS. IMM.  GRANS. 0.1 0.0 - 0.5 K/UL   METABOLIC PANEL, COMPREHENSIVE    Collection Time: 03/15/20  4:40 PM   Result Value Ref Range    Sodium 138 136 - 145 mmol/L    Potassium 3.6 3.5 - 5.1 mmol/L    Chloride 101 98 - 107 mmol/L    CO2 31 21 - 32 mmol/L    Anion gap 6 (L) 7 - 16 mmol/L    Glucose 132 (H) 65 - 100 mg/dL    BUN 24 (H) 8 - 23 MG/DL    Creatinine 1.75 (H) 0.8 - 1.5 MG/DL    GFR est AA 48 (L) >60 ml/min/1.73m2    GFR est non-AA 40 (L) >60 ml/min/1.73m2    Calcium 8.5 8.3 - 10.4 MG/DL    Bilirubin, total 0.4 0.2 - 1.1 MG/DL    ALT (SGPT) 28 12 - 65 U/L    AST (SGOT) 16 15 - 37 U/L    Alk. phosphatase 81 50 - 136 U/L    Protein, total 7.2 6.3 - 8.2 g/dL    Albumin 2.9 (L) 3.2 - 4.6 g/dL    Globulin 4.3 (H) 2.3 - 3.5 g/dL    A-G Ratio 0.7 (L) 1.2 - 3.5     LIPASE    Collection Time: 03/15/20  4:40 PM   Result Value Ref Range    Lipase 94 73 - 393 U/L   LACTIC ACID    Collection Time: 03/15/20  4:40 PM   Result Value Ref Range    Lactic acid 2.2 (HH) 0.4 - 2.0 MMOL/L   PROCALCITONIN    Collection Time: 03/15/20  4:40 PM   Result Value Ref Range    Procalcitonin 0.09 ng/mL   NT-PRO BNP    Collection Time: 03/15/20  4:40 PM   Result Value Ref Range    NT pro- (H) <450 PG/ML   TROPONIN I    Collection Time: 03/15/20  4:40 PM   Result Value Ref Range    Troponin-I, Qt. <0.02 (L) 0.02 - 0.05 NG/ML   CULTURE, BLOOD    Collection Time: 03/15/20  4:40 PM   Result Value Ref Range    Special Requests: LEFT  HAND        Culture result: NO GROWTH AFTER 12 HOURS     INFLUENZA A & B AG (RAPID TEST)    Collection Time: 03/15/20  4:40 PM   Result Value Ref Range    Influenza A Ag NEGATIVE  NEG      Influenza B Ag NEGATIVE  NEG      Source Nasopharyngeal     CULTURE, URINE    Collection Time: 03/15/20  5:26 PM   Result Value Ref Range    Special Requests: NO SPECIAL REQUESTS      Culture result:        NO GROWTH AFTER SHORT PERIOD OF INCUBATION. FURTHER RESULTS TO FOLLOW AFTER OVERNIGHT INCUBATION.    URINALYSIS W/ RFLX MICROSCOPIC    Collection Time: 03/15/20  5:27 PM   Result Value Ref Range    Color YELLOW      Appearance TURBID      Specific gravity 1.012 1.001 - 1.023      pH (UA) 6.0 5.0 - 9.0      Protein 100 (A) NEG mg/dL    Glucose NEGATIVE  mg/dL    Ketone NEGATIVE  NEG mg/dL    Bilirubin NEGATIVE  NEG      Blood LARGE (A) NEG      Urobilinogen 0.2 0.2 - 1.0 EU/dL    Nitrites NEGATIVE  NEG      Leukocyte Esterase LARGE (A) NEG      WBC >100 0 /hpf    RBC >100 0 /hpf    Bacteria 4+ (H) 0 /hpf    Other observations RESULTS VERIFIED MANUALLY     PROTHROMBIN TIME + INR    Collection Time: 03/15/20  8:45 PM   Result Value Ref Range    Prothrombin time 16.6 (H) 12.0 - 14.7 sec    INR 1.3     PTT    Collection Time: 03/15/20  8:45 PM   Result Value Ref Range    aPTT 37.5 (H) 24.3 - 35.4 SEC   CBC WITH AUTOMATED DIFF    Collection Time: 03/16/20  6:58 AM   Result Value Ref Range    WBC 13.4 (H) 4.3 - 11.1 K/uL    RBC 3.82 (L) 4.23 - 5.6 M/uL    HGB 10.3 (L) 13.6 - 17.2 g/dL    HCT 32.0 (L) 41.1 - 50.3 %    MCV 83.8 79.6 - 97.8 FL    MCH 27.0 26.1 - 32.9 PG    MCHC 32.2 31.4 - 35.0 g/dL    RDW 14.4 11.9 - 14.6 %    PLATELET 672 358 - 949 K/uL    MPV 11.2 9.4 - 12.3 FL    ABSOLUTE NRBC 0.00 0.0 - 0.2 K/uL    DF AUTOMATED      NEUTROPHILS 63 43 - 78 %    LYMPHOCYTES 25 13 - 44 %    MONOCYTES 10 4.0 - 12.0 %    EOSINOPHILS 1 0.5 - 7.8 %    BASOPHILS 0 0.0 - 2.0 %    IMMATURE GRANULOCYTES 1 0.0 - 5.0 %    ABS. NEUTROPHILS 8.5 (H) 1.7 - 8.2 K/UL    ABS. LYMPHOCYTES 3.3 0.5 - 4.6 K/UL    ABS. MONOCYTES 1.3 0.1 - 1.3 K/UL    ABS. EOSINOPHILS 0.2 0.0 - 0.8 K/UL    ABS. BASOPHILS 0.1 0.0 - 0.2 K/UL    ABS. IMM. GRANS. 0.1 0.0 - 0.5 K/UL   METABOLIC PANEL, BASIC    Collection Time: 03/16/20  6:58 AM   Result Value Ref Range    Sodium 138 136 - 145 mmol/L    Potassium 3.3 (L) 3.5 - 5.1 mmol/L    Chloride 103 98 - 107 mmol/L    CO2 27 21 - 32 mmol/L    Anion gap 8 7 - 16 mmol/L    Glucose 116 (H) 65 - 100 mg/dL    BUN 26 (H) 8 - 23 MG/DL    Creatinine 1.81 (H) 0.8 - 1.5 MG/DL    GFR est AA 46 (L) >60 ml/min/1.73m2    GFR est non-AA 38 (L) >60 ml/min/1.73m2    Calcium 8.4 8.3 - 10.4 MG/DL        All Micro Results     Procedure Component Value Units Date/Time    CULTURE, URINE [[id number]] Collected:  03/15/20 1726    Order Status:  Completed Specimen:  Urine from Clean catch Updated:  03/16/20 0806     Special Requests: NO SPECIAL REQUESTS        Culture result:       NO GROWTH AFTER SHORT PERIOD OF INCUBATION. FURTHER RESULTS TO FOLLOW AFTER OVERNIGHT INCUBATION. CULTURE, BLOOD [[id number]] Collected:  03/15/20 1640    Order Status:  Completed Specimen:  Blood Updated:  03/16/20 6336     Special Requests: --        LEFT  HAND       Culture result: NO GROWTH AFTER 12 HOURS       INFLUENZA A & B AG (RAPID TEST) [[id number]] Collected:  03/15/20 1640    Order Status:  Completed Specimen:  Nasopharyngeal from Nasal washing Updated:  03/15/20 1703     Influenza A Ag NEGATIVE         Comment: NEGATIVE FOR THE PRESENCE OF INFLUENZA A ANTIGEN  INFECTION DUE TO INFLUENZA A CANNOT BE RULED OUT. BECAUSE THE ANTIGEN PRESENT IN THE SAMPLE MAY BE BELOW  THE DETECTION LIMIT OF THE TEST. A NEGATIVE TEST IS PRESUMPTIVE AND IT IS RECOMMENDED THAT THESE RESULTS BE CONFIRMED BY VIRAL CULTURE OR AN FDA-CLEARED INFLUENZA A AND B MOLECULAR ASSAY. Influenza B Ag NEGATIVE         Comment: NEGATIVE FOR THE PRESENCE OF INFLUENZA B ANTIGEN  INFECTION DUE TO INFLUENZA B CANNOT BE RULED OUT. BECAUSE THE ANTIGEN PRESENT IN THE SAMPLE MAY BE BELOW  THE DETECTION LIMIT OF THE TEST. A NEGATIVE TEST IS PRESUMPTIVE AND IT IS RECOMMENDED THAT THESE RESULTS BE CONFIRMED BY VIRAL CULTURE OR AN FDA-CLEARED INFLUENZA A AND B MOLECULAR ASSAY.           Source Nasopharyngeal       CULTURE, BLOOD [[id number]]     Order Status:  Sent Specimen:  Blood           Current Meds:  Current Facility-Administered Medications   Medication Dose Route Frequency    sodium chloride (NS) flush 5-40 mL  5-40 mL IntraVENous Q8H    sodium chloride (NS) flush 5-40 mL  5-40 mL IntraVENous PRN    acetaminophen (TYLENOL) tablet 650 mg  650 mg Oral Q6H PRN    HYDROcodone-acetaminophen (NORCO) 5-325 mg per tablet 1 Tab  1 Tab Oral Q4H PRN    ondansetron (ZOFRAN) injection 4 mg  4 mg IntraVENous Q4H PRN    bisacodyL (DULCOLAX) tablet 5 mg  5 mg Oral DAILY PRN    amLODIPine (NORVASC) tablet 5 mg  5 mg Oral DAILY    escitalopram oxalate (LEXAPRO) tablet 10 mg  10 mg Oral DAILY    hydroCHLOROthiazide (HYDRODIURIL) tablet 12.5 mg  12.5 mg Oral DAILY    levothyroxine (SYNTHROID) tablet 137 mcg  137 mcg Oral ACB    losartan (COZAAR) tablet 100 mg  100 mg Oral DAILY    magnesium oxide (MAG-OX) tablet 400 mg  400 mg Oral DAILY    pantoprazole (PROTONIX) tablet 40 mg  40 mg Oral ACB    cefTRIAXone (ROCEPHIN) 1 g in 0.9% sodium chloride (MBP/ADV) 50 mL  1 g IntraVENous Q24H       Other Studies:    Ct Abd Pelv W Cont    Result Date: 3/15/2020  CT abdomen and pelvis with contrast COMPARISON: January 16, 2017. INDICATION: Left lower quadrant pain. TECHNIQUE: CT imaging was performed of the abdomen and pelvis following the uncomplicated administration of intravenous contrast (Isovue 370, 100 mL). Oral contrast was administered. Radiation dose reduction techniques were used for this study:  Our CT scanners use one or all of the following: Automated exposure control, adjustment of the mA and/or kVp according to patient's size, iterative reconstruction. FINDINGS: There is trace left pleural effusion, chronic. Cardiac cardiac pacer wires are partially seen. Abdomen findings: Gallbladder is surgically absent. The liver, pancreas, and spleen, and adrenal glands are unremarkable. Abdominal aorta is normal in course and caliber with atherosclerotic calcification. Stomach is normal in contour. Small bowel loops are normal in caliber. There is no small bowel obstruction. Left kidney is atrophic. There is chronic dilatation of the left renal collecting system. There are bilateral nonobstructing renal calculi. There is a stable 4.5 cm parapelvic right renal cyst. Additional 12 mm cyst at the lower pole of the right kidney is absent. Pelvic findings: There is a left rectus sheath hematoma, measuring up to 4.0 x 6.5 cm. Prostate gland is mildly enlarged. There is mild urinary bladder wall thickening. The colon is normal in caliber. Appendix is absent. There is no free air or free fluid. There are degenerative changes of the spine.  Bones are osteopenic. IMPRESSION: 1. Left rectus sheath hematoma, measuring up to 4.5 x 6.0 cm. 2. Bilateral nonobstructing renal calculi. Chronic dilatation of the left renal collecting system. Findings are similar to 2017 study. 3. Mild urinary bladder wall thickening, likely secondary to bladder outlet obstruction. Prostate gland mildly enlarged. 4. Negative for colitis, diverticulitis or bowel obstruction. Xr Chest Port    Result Date: 3/15/2020  PORTABLE CHEST, March 15, 2020 at 1808 hours CLINICAL HISTORY:  Shortness of breath, fever, and left back pain. COMPARISON:  February 6, 2019. FINDINGS:  AP erect image demonstrates no confluent infiltrate or significant pleural fluid. The heart size is within normal limits status post CABG without evidence of congestive heart failure or pneumothorax. The bony thorax appears intact on this view. There are overlying radiopaque support devices. IMPRESSION:  NO ACUTE CARDIOPULMONARY DISEASE IDENTIFIED. Assessment and Plan:     Hospital Problems as of 3/16/2020 Date Reviewed: 10/24/2019          Codes Class Noted - Resolved POA    * (Principal) Hematoma ICD-10-CM: T14. 8XXA  ICD-9-CM: 924.9  3/15/2020 - Present Unknown        UTI (urinary tract infection) ICD-10-CM: N39.0  ICD-9-CM: 599.0  3/15/2020 - Present Unknown        Chronic kidney disease (CKD), stage III (moderate) (HCC) ICD-10-CM: N18.3  ICD-9-CM: 585.3  9/19/2016 - Present Yes        Renal stones ICD-10-CM: N20.0  ICD-9-CM: 592.0  2/20/2014 - Present Yes        Hypertension ICD-10-CM: I10  ICD-9-CM: 401.9  4/16/2013 - Present Yes        CAD (coronary artery disease) ICD-10-CM: I25.10  ICD-9-CM: 414.00  4/16/2013 - Present Yes    Overview Addendum 9/29/2015 12:49 PM by Daria Hester     1. CABG 2005: LIMA to LAD, SVG to diagonal, SVG to OM and SVG to RCA  2. NSTEMI (12/11/13): Peak troponin 4.66.   A. LHC: Severe 3 vessel CAD. Occluded SVG to PDA and diagonal. Stenosis in SVG to OM.    B. PCI of SVG to OM: Distal body treated with 4 x 18 mm vision BMS. Prox body lesion treated with integrity 4 x 18 BMS. C. Echo: Normal LV systolic function. EF 55-60%. Mild mitral/tricuspid regurgitation. 3. Echo (9/23/14): EF 50-55%. Mild LAE. Mild mitral regurgitation. Hypothyroidism ICD-10-CM: E03.9  ICD-9-CM: 244.9  4/16/2013 - Present Yes        Hyperlipidemia ICD-10-CM: E78.5  ICD-9-CM: 272.4  4/16/2013 - Present Yes        Osteoarthritis ICD-10-CM: M19.90  ICD-9-CM: 715.90  4/16/2013 - Present Yes        Gout ICD-10-CM: M10.9  ICD-9-CM: 274.9  4/16/2013 - Present Yes              Plan:   is a 60-year-old male with past medical history of recurrent UTIs, MI, anemia, CABG, prior CVA(with residual mild expressive aphagia) who was brought in due to abdominal pain and found to have rectus sheath hematoma. Rectus Sheath Hematoma  - hemodynamically stable  - Hb 10.3 (11 on admission)  - monitor H&H for now  - Surgery consult if hemodynamic instability or h&h continues to downtrend  - Counseled on benefit and risk of stopping anticoagulation at the time. He expressed understanding. UTI  - c/w rocephin (3/16)  - f/u UCx    CKD stage III  - monitor and avoid nephrotoxic medication    Hx Atrial Fibrillation  Hypertension  CAD, CABG 2005, and NSTEMI 12/2013  S/p pacemaker  - c/w home medications  - hold eliquis for now. Hypothyroidism: c/w home medications  Hyperlipidemia: c/w home medications      Diet:  DIET CARDIAC  DVT PPx: SCDs  Code: Full Code      Medical Decision Making:    Labs/Imaging reviewed. Additional information obtained from nursing staff. Patient is high risk due to medical condition and comorbidities. Plan discussed with nursing staff. Plan discussed with patient/family. All questions/concerns were addressed. Pt/family agrees with the plan.          Signed By: Mike Avendano MD     March 16, 2020

## 2020-03-16 NOTE — PROGRESS NOTES
All hourly rounds completed, pt has slept on/off throughout shift, no changes, wife at bedside, will report off to oncoming shift.

## 2020-03-16 NOTE — PROGRESS NOTES
Care Management Interventions  PCP Verified by CM: Yes(Dr. Sara Cr)  Mode of Transport at Discharge: Other (see comment)(To be determined based on patient needs. )  Transition of Care Consult (CM Consult): Home Health, Discharge Planning  [facility] [address] [place] [address],[address] [id number]: No  Reason Outside [place]: Patient already serviced by other home care/hospice agency  Discharge Durable Medical Equipment: Yes(Walker, shower chair, cane, wheelchair, elaine lift, hospital bed)  Physical Therapy Consult: No  Occupational Therapy Consult: No  Speech Therapy Consult: No  Current Support Network: Own Home, Lives with Spouse, Family Lives Nearby  Confirm Follow Up Transport: Family  Discharge Location  Discharge Placement: Home with home health    CM met with patient and family in room. Patient unable to answer questions r/t confusion. Patient wife Onalee Nissen at bedside and answered questions. Onalee Nissen stated she and patient live alone in a one story home that has no steps to enter the residence. DME: [employer], Wheelchair, shower chair, [facility], Hospital Bed, [employer] lift. O2: n/a  CPAP: n/a  HD: n/a  ADL: Onmohinderrahul Nissen stated patient is completely bed bound and requires total assistance. Onmohindere Nissen stated patient is current with [facility]. Onalee Nissen stated her niece assists with patient care twice a week. Onalee Nissen stated they need a new hospital bed for patient as the one they have now is not working properly. Onalee Nissen stated she cannot remember where the bed came from. Onalee Nissen stated they would like to take patient back home and [facility]. CM will continue to follow patient during hospitalization for discharge planning and needs. Please consult or notify CM for any new needs.

## 2020-03-16 NOTE — ED NOTES
TRANSFER - OUT REPORT:    Verbal report given to Hieu Black on Catalina Fender  being transferred to  for routine progression of care       Report consisted of patients Situation, Background, Assessment and   Recommendations(SBAR). Information from the following report(s) SBAR, ED Summary and MAR was reviewed with the receiving nurse. Lines:   Peripheral IV 03/15/20 Left Hand (Active)   Site Assessment Clean, dry, & intact 3/15/2020  4:47 PM   Phlebitis Assessment 0 3/15/2020  4:47 PM   Infiltration Assessment 0 3/15/2020  4:47 PM   Dressing Status Clean, dry, & intact 3/15/2020  4:47 PM   Hub Color/Line Status Pink 3/15/2020  4:47 PM        Opportunity for questions and clarification was provided.       Patient transported with:   O2 @ 2 liters  Tech

## 2020-03-16 NOTE — PROGRESS NOTES
Patient has rested well overnight and denied needs to this writer. Hourly rounds have been completed and needs for patient have been attended. He will continue to be monitored and assisted prn until day shift assumes care of patient.

## 2020-03-16 NOTE — H&P
History and Physical    Patient: Paulo Allen MRN: [id number]  SSN: [id number]    YOB: 1935  Age: 80 y.o. Sex: male      Subjective:      Paulo Allen is a 80 y.o. male who was brought to emergency room because of abdominal pain for the past 3 days. Patient has been taking Eliquis, for his atrial fibrillation. Denies any accident or injury. Has been having this abdominal pain in the left lower quadrant area for the past 3 days. He did not notice any cut on the abdominal wall. Did not notice any expansion of the area. In the emergency room he was found to have hematoma in the rectus sheath. There is no hypotension. Work-up also found pyuria. Patient has no fever. No chest pain. Some shortness of breath but this has been his baseline according to the daughter. Hospitalist service is requested to admit the patient. Other multiple medical problems are listed below. Past Medical History:   Diagnosis Date    Arthritis     Asthma     as a kid. none recent    Atrial flutter with rapid ventricular response ([address] 6/18/2014    1. Diagnosed 6/18/14: s/p ablation. 2. Echo (6/19/14): EF 45-50%. Moderate mitral regurgitation. 3. Ecardio (8/12-8/25/14): No atrial arrhythmias.  Basal cell carcinoma of skin of other and unspecified parts of face 2/20/2014    Benign hypertensive heart disease without heart failure 9/29/2015    BPH (benign prostatic hyperplasia) 4/16/2013    CAD (coronary artery disease)     CABG, cardiac cath , stents times 2    Calculus of kidney 2/20/2014    Cancer [facility])     Carotid artery stenosis with cerebral infarction ([address] 9/29/2015    Followed by Dr. Nicole Ly.     Chest pain, unspecified 12/11/2013    CVA (cerebral vascular accident) ([address] 4/16/2013    Diarrhea following gastrointestinal surgery 4/16/2013    Diverticulitis colectomy    Dizziness 6/18/2014    Generalized weakness     GERD (gastroesophageal reflux disease)     Gout 4/16/2013    Heart disease, unspecified 2/20/2014    History of basal cell cancer nose    History of CVA (cerebrovascular accident) 6/18/2014    History of mini-strokes.      History of kidney stones 2006    Hyperlipidemia 4/16/2013    Hypertension     Hypertrophy of prostate with urinary obstruction and other lower urinary tract symptoms (LUTS) 2/20/2014    Hypothyroidism 4/16/2013    Incomplete bladder emptying 2/20/2014    Iron deficiency anemia 4/16/2013    NSTEMI (non-ST elevated myocardial infarction) [facility] 12/12/2013    Osteoarthritis 4/16/2013    Platelet inhibition due to Plavix     Pure hypercholesterolemia 2/20/2014    Stroke [facility])     x3 with the last one being 2/12    Unspecified adverse effect of anesthesia     state he woke up during heart surgery    Unspecified asthma 2/20/2014    Unspecified disorder of thyroid 2/20/2014    Urinary frequency 2/20/2014    Urinary tract infection, site not specified 11/20/2013     Past Surgical History:   Procedure Laterality Date    CABG, ARTERY-VEIN, FOUR  2005    HX ADENOIDECTOMY      HX APPENDECTOMY  age 15   [facility] HX CAROTID ENDARTERECTOMY Right 2011    HX CATARACT REMOVAL Bilateral 2006    HX CHOLECYSTECTOMY      HX CORONARY STENT PLACEMENT      HX OTHER SURGICAL  3/12/14    Plasma Button procedure of prostate    HX PACEMAKER PLACEMENT  02/06/2019    Dr. Cyndie Briscoe  12/12/2013    bmsx2 svg to !st obtuse marginal    REMOVAL OF KIDNEY STONE  05/2016    TOTAL KNEE ARTHROPLASTY  2007    right    TOTAL KNEE ARTHROPLASTY  2012    left      Family History   Problem Relation Age of Onset    Heart Disease Father     Heart Attack Father     Stroke Sister     Cancer Sister         brain    Heart Disease Mother     Bleeding Prob Mother 80        breast cancer     Social History     Tobacco Use    Smoking status: Former Smoker     Types: Cigars Last attempt to quit: 2002     Years since quittin.2    Smokeless tobacco: Never Used    Tobacco comment: quit - cigars   Substance Use Topics    Alcohol use: No     Alcohol/week: 0.0 standard drinks     Comment: rare      Prior to Admission medications    Medication Sig Start Date End Date Taking? Authorizing Provider   amoxicillin (AMOXIL) 875 mg tablet Take 1 Tab by mouth two (2) times a day for 10 days. 3/13/20 3/23/20  Carlin Gallegos MD   hydroCHLOROthiazide (HYDRODIURIL) 25 mg tablet TAKE 1/2 TABLET BY MOUTH DAILY 20   Carlin Gallegos MD   pantoprazole (PROTONIX) 40 mg tablet TAKE 1 TABLET BY MOUTH EVERY DAY 19   Hunter Carcamo MD   losartan (COZAAR) 100 mg tablet TAKE 1 TABLET BY MOUTH EVERY DAY 19   Hunter Reynoso MD   cyanocobalamin (VITAMIN B12) 1,000 mcg/mL injection 1 ML INJECTED INTRAMUSCULARLY ONCE MONTHLY 19   Carlin Gallegos MD   ELIQUIS 2.5 mg tablet Take 1 Tab by mouth two (2) times a day. 10/28/19   Ger Fowler MD   varicella-zoster recombinant, PF, (SHINGRIX, PF,) 50 mcg/0.5 mL susr injection 0.5mL by IntraMUSCular route once now and then repeat in 2-6 months 10/24/19   Carlin Gallegos MD   ergocalciferol (VITAMIN D2) 50,000 unit capsule Take 1 Cap by mouth every seven (7) days. 10/24/19   Hunter Reynoso MD   escitalopram oxalate (LEXAPRO) 10 mg tablet TAKE 1 TABLET BY MOUTH EVERY DAY IN THE MORNING 10/17/19   Hunter Reynoso MD   nitroglycerin (NITROSTAT) 0.4 mg SL tablet 1 Tab by SubLINGual route every five (5) minutes as needed for Chest Pain. 10/9/19   Ger Fowler MD   amLODIPine (NORVASC) 5 mg tablet Take 1 Tab by mouth daily. 10/9/19   Ger Fowler MD   cyanocobalamin (VITAMIN B-12) 1,000 mcg/mL injection 1 ml injected intramuscularly once monthly 6/10/19   Carlin Gallegos MD   magnesium oxide (MAG-OX) 400 mg tablet Take 1 Tab by mouth daily.  6/10/19   Hunter Reynoso MD   OTHER Gel mattre overlay 5/30/19   Romina Wick MD   nystatin (MYCOSTATIN) powder Apply  to affected area three (3) times daily as needed for Other (rash). 5/7/19   Ze Reynoso MD   levothyroxine (SYNTHROID) 137 mcg tablet Take  by mouth Daily (before breakfast). Provider, Historical   OTHER Trapeze bar for bed mobility. 11/16/18   Romina Wick MD   OTHER Wedges for propping in bed. 10/2/18   Romina Wick MD   colchicine (MITIGARE) 0.6 mg capsule Take 0.6 mg by mouth as needed. Provider, Historical        Allergies   Allergen Reactions    Other Medication Other (comments)     Wife reports an unknown reaction to an unknown pain medicine. Cannot recall name or details. Review of Systems:    10-point review of systems is negative except what is mentioned in the present illness section. Objective:     Vitals:    03/15/20 1613 03/15/20 1645 03/15/20 1922 03/15/20 2021   BP: 142/73  131/66 140/63   Pulse: 88  85 82   Resp:   19 28   Temp: 98.5 °F (36.9 °C)      SpO2: 99% 99% 96% 100%   Weight: 95.7 kg (211 lb)      Height: 6' 1\" (1.854 m)           Physical Exam:    General:                    The patient is a pleasant elderly male in mild acute respiratory distress when speaking long sentences  Head:                                   Normocephalic/atraumatic Eyes:                                     No palpebral pallor or scleral icterus. ENT:                                    External auricular and nasal exam within normal limits. Mucous membranes are moist  Neck:                                   Supple, non-tender, no JVD. Lungs:                        Decreased to auscultation bilaterally with occasional wheezes or crackles. No respiratory accessory muscle use. Heart:                                  Regular rate and rhythm, without murmurs, rubs, or gallops.   Abdomen:                  Soft, non-tender, distended with normoactive bowel sounds. Tender at the left lower quadrant abdominal area. No fluctuation. Genitourinary:           No tenderness over the bladder or bilateral CVAs. Extremities:               Without clubbing, cyanosis, or edema. Skin:                                    Normal color, texture, and turgor. No rashes, lesions, or jaundice. Pulses:                      Radial and dorsalis pedis pulses present 2+ bilaterally. Capillary refill <2s. Neurologic:                CN II-XII grossly intact and symmetrical.                                               Moving all four extremities well with no focal deficits. Psychiatric:                Pleasant demeanor, appropriate affect. Alert and oriented x 3    Lab and data     Recent Results (from the past 24 hour(s))   EKG, 12 LEAD, INITIAL    Collection Time: 03/15/20  4:21 PM   Result Value Ref Range    Ventricular Rate 85 BPM    Atrial Rate 85 BPM    P-R Interval 172 ms    QRS Duration 156 ms    Q-T Interval 422 ms    QTC Calculation (Bezet) 502 ms    Calculated P Axis 61 degrees    Calculated R Axis -90 degrees    Calculated T Axis 81 degrees    Diagnosis       Electronic ventricular pacemaker  Confirmed by Wendy Martínez ([id number]) on 3/15/2020 5:31:41 PM     CBC WITH AUTOMATED DIFF    Collection Time: 03/15/20  4:40 PM   Result Value Ref Range    WBC 12.9 (H) 4.3 - 11.1 K/uL    RBC 4.11 (L) 4.23 - 5.6 M/uL    HGB 11.0 (L) 13.6 - 17.2 g/dL    HCT 34.4 (L) 41.1 - 50.3 %    MCV 83.7 79.6 - 97.8 FL    MCH 26.8 26.1 - 32.9 PG    MCHC 32.0 31.4 - 35.0 g/dL    RDW 14.0 11.9 - 14.6 %    PLATELET 846 862 - 155 K/uL    MPV 10.8 9.4 - 12.3 FL    ABSOLUTE NRBC 0.00 0.0 - 0.2 K/uL    DF AUTOMATED      NEUTROPHILS 69 43 - 78 %    LYMPHOCYTES 20 13 - 44 %    MONOCYTES 8 4.0 - 12.0 %    EOSINOPHILS 2 0.5 - 7.8 %    BASOPHILS 0 0.0 - 2.0 %    IMMATURE GRANULOCYTES 1 0.0 - 5.0 %    ABS.  NEUTROPHILS 9.0 (H) 1.7 - 8.2 K/UL    ABS. LYMPHOCYTES 2.6 0.5 - 4.6 K/UL    ABS. MONOCYTES 1.1 0.1 - 1.3 K/UL    ABS. EOSINOPHILS 0.2 0.0 - 0.8 K/UL    ABS. BASOPHILS 0.0 0.0 - 0.2 K/UL    ABS. IMM. GRANS. 0.1 0.0 - 0.5 K/UL   METABOLIC PANEL, COMPREHENSIVE    Collection Time: 03/15/20  4:40 PM   Result Value Ref Range    Sodium 138 136 - 145 mmol/L    Potassium 3.6 3.5 - 5.1 mmol/L    Chloride 101 98 - 107 mmol/L    CO2 31 21 - 32 mmol/L    Anion gap 6 (L) 7 - 16 mmol/L    Glucose 132 (H) 65 - 100 mg/dL    BUN 24 (H) 8 - 23 MG/DL    Creatinine 1.75 (H) 0.8 - 1.5 MG/DL    GFR est AA 48 (L) >60 ml/min/1.73m2    GFR est non-AA 40 (L) >60 ml/min/1.73m2    Calcium 8.5 8.3 - 10.4 MG/DL    Bilirubin, total 0.4 0.2 - 1.1 MG/DL    ALT (SGPT) 28 12 - 65 U/L    AST (SGOT) 16 15 - 37 U/L    Alk.  phosphatase 81 50 - 136 U/L    Protein, total 7.2 6.3 - 8.2 g/dL    Albumin 2.9 (L) 3.2 - 4.6 g/dL    Globulin 4.3 (H) 2.3 - 3.5 g/dL    A-G Ratio 0.7 (L) 1.2 - 3.5     LIPASE    Collection Time: 03/15/20  4:40 PM   Result Value Ref Range    Lipase 94 73 - 393 U/L   LACTIC ACID    Collection Time: 03/15/20  4:40 PM   Result Value Ref Range    Lactic acid 2.2 (HH) 0.4 - 2.0 MMOL/L   PROCALCITONIN    Collection Time: 03/15/20  4:40 PM   Result Value Ref Range    Procalcitonin 0.09 ng/mL   NT-PRO BNP    Collection Time: 03/15/20  4:40 PM   Result Value Ref Range    NT pro- (H) <450 PG/ML   TROPONIN I    Collection Time: 03/15/20  4:40 PM   Result Value Ref Range    Troponin-I, Qt. <0.02 (L) 0.02 - 0.05 NG/ML   INFLUENZA A & B AG (RAPID TEST)    Collection Time: 03/15/20  4:40 PM   Result Value Ref Range    Influenza A Ag NEGATIVE  NEG      Influenza B Ag NEGATIVE  NEG      Source Nasopharyngeal     URINALYSIS W/ RFLX MICROSCOPIC    Collection Time: 03/15/20  5:27 PM   Result Value Ref Range    Color YELLOW      Appearance TURBID      Specific gravity 1.012 1.001 - 1.023      pH (UA) 6.0 5.0 - 9.0      Protein 100 (A) NEG mg/dL    Glucose NEGATIVE mg/dL    Ketone NEGATIVE  NEG mg/dL    Bilirubin NEGATIVE  NEG      Blood LARGE (A) NEG      Urobilinogen 0.2 0.2 - 1.0 EU/dL    Nitrites NEGATIVE  NEG      Leukocyte Esterase LARGE (A) NEG      WBC >100 0 /hpf    RBC >100 0 /hpf    Bacteria 4+ (H) 0 /hpf    Other observations RESULTS VERIFIED MANUALLY     PROTHROMBIN TIME + INR    Collection Time: 03/15/20  8:45 PM   Result Value Ref Range    Prothrombin time 16.6 (H) 12.0 - 14.7 sec    INR 1.3     PTT    Collection Time: 03/15/20  8:45 PM   Result Value Ref Range    aPTT 37.5 (H) 24.3 - 35.4 SEC     CT abdomen and pelvis   3-  IMPRESSION:     1. Left rectus sheath hematoma, measuring up to 4.5 x 6.0 cm.     2. Bilateral nonobstructing renal calculi. Chronic dilatation of the left renal  collecting system. Findings are similar to 2017 study.     3. Mild urinary bladder wall thickening, likely secondary to bladder outlet  obstruction. Prostate gland mildly enlarged.     4. Negative for colitis, diverticulitis or bowel obstruction. XR chest   3-  IMPRESSION:  NO ACUTE CARDIOPULMONARY DISEASE IDENTIFIED. I have reviewed chest x-ray and ECG myself. Assessment:     Hospital Problems  Date Reviewed: 10/24/2019          Codes Class Noted POA    * (Principal) Hematoma ICD-10-CM: T14. 8XXA  ICD-9-CM: 924.9  3/15/2020 Unknown        Chronic kidney disease (CKD), stage III (moderate) (HCC) ICD-10-CM: N18.3  ICD-9-CM: 585.3  9/19/2016 Yes        Renal stones ICD-10-CM: N20.0  ICD-9-CM: 592.0  2/20/2014 Yes        Hypertension ICD-10-CM: I10  ICD-9-CM: 401.9  4/16/2013 Yes        CAD (coronary artery disease) ICD-10-CM: I25.10  ICD-9-CM: 414.00  4/16/2013 Yes    Overview Addendum 9/29/2015 12:49 PM by Boni Alcaraz     1. CABG 2005: LIMA to LAD, SVG to diagonal, SVG to OM and SVG to RCA  2. NSTEMI (12/11/13): Peak troponin 4.66.   A. LHC: Severe 3 vessel CAD. Occluded SVG to PDA and diagonal. Stenosis in SVG to OM.    B. PCI of SVG to OM: Distal body treated with 4 x 18 mm vision BMS. Prox body lesion treated with integrity 4 x 18 BMS. C. Echo: Normal LV systolic function. EF 55-60%. Mild mitral/tricuspid regurgitation. 3. Echo (9/23/14): EF 50-55%. Mild LAE. Mild mitral regurgitation. Hypothyroidism ICD-10-CM: E03.9  ICD-9-CM: 244.9  4/16/2013 Yes        Hyperlipidemia ICD-10-CM: E78.5  ICD-9-CM: 272.4  4/16/2013 Yes        Osteoarthritis ICD-10-CM: M19.90  ICD-9-CM: 715.90  4/16/2013 Yes        Gout ICD-10-CM: M10.9  ICD-9-CM: 274.9  4/16/2013 Yes            UTI     Plan:     Rectal sheath hematoma  Patient has been taking anticoagulation  Admitted to medical floor  Monitor and check CBC  Stop Eliquis  Monitor symptoms  Give pain control if needed    UTI  Send urine for culture  Give empiric Rocephin IV    Hypothyroidism  Hyperlipidemia  Gout  CAD   Continue home medications    Hypertension  Monitor blood pressure and manage accordingly. Continue home medications. Patient requires hospital stay as an in-patient and anticipated stay is more than 2 midnights due to the serious nature of the illness. I have discussed with patient regarding advance directive. Patient would like to have a full-code status. Healthcare power of  is wife. I have discussed the plan of care with patient and family member at bedside. Patient has pain now. Pain medications are ordered and patient will be monitored and followed for response.      DVT prophylaxis : SCD       Signed By: Nancy Guardado MD     March 15, 2020

## 2020-03-16 NOTE — PROGRESS NOTES
Patient was resting peacefully  Will follow up    Yeni Hobbs, staff [address], [id number] [facility] [place]  /   [email or web address]

## 2020-03-16 NOTE — PROGRESS NOTES
03/16/20 0035   Dual Skin Pressure Injury Assessment   Dual Skin Pressure Injury Assessment X   Second Care Provider (Based on [address] [place] [address]) STEPHANIE Pichardo RN   Skin Integumentary   Skin Integumentary (WDL) WDL   Wound Prevention and Protection Methods   Orientation of Wound Prevention Posterior   Location of Wound Prevention Sacrum/Coccyx   Dressing Present  No   Patient is pale in color, and has no breakdown noted to sacral area, he has signs of old healed area there.

## 2020-03-17 PROBLEM — E87.6 HYPOKALEMIA: Status: ACTIVE | Noted: 2020-03-17

## 2020-03-17 PROBLEM — S30.1XXA RECTUS SHEATH HEMATOMA, INITIAL ENCOUNTER: Status: ACTIVE | Noted: 2020-03-17

## 2020-03-17 LAB
ANION GAP SERPL CALC-SCNC: 8 MMOL/L (ref 7–16)
BUN SERPL-MCNC: 32 MG/DL (ref 8–23)
CALCIUM SERPL-MCNC: 8.6 MG/DL (ref 8.3–10.4)
CHLORIDE SERPL-SCNC: 105 MMOL/L (ref 98–107)
CO2 SERPL-SCNC: 27 MMOL/L (ref 21–32)
CREAT SERPL-MCNC: 2.03 MG/DL (ref 0.8–1.5)
ERYTHROCYTE [DISTWIDTH] IN BLOOD BY AUTOMATED COUNT: 14.3 % (ref 11.9–14.6)
GLUCOSE SERPL-MCNC: 108 MG/DL (ref 65–100)
HCT VFR BLD AUTO: 29.4 % (ref 41.1–50.3)
HGB BLD-MCNC: 9.5 G/DL (ref 13.6–17.2)
MCH RBC QN AUTO: 27 PG (ref 26.1–32.9)
MCHC RBC AUTO-ENTMCNC: 32.3 G/DL (ref 31.4–35)
MCV RBC AUTO: 83.5 FL (ref 79.6–97.8)
NRBC # BLD: 0 K/UL (ref 0–0.2)
PLATELET # BLD AUTO: 270 K/UL (ref 150–450)
PMV BLD AUTO: 10.8 FL (ref 9.4–12.3)
POTASSIUM SERPL-SCNC: 3.1 MMOL/L (ref 3.5–5.1)
RBC # BLD AUTO: 3.52 M/UL (ref 4.23–5.6)
SODIUM SERPL-SCNC: 140 MMOL/L (ref 136–145)
WBC # BLD AUTO: 11.1 K/UL (ref 4.3–11.1)

## 2020-03-17 PROCEDURE — 65270000029 HC RM PRIVATE

## 2020-03-17 PROCEDURE — 74011250637 HC RX REV CODE- 250/637: Performed by: INTERNAL MEDICINE

## 2020-03-17 PROCEDURE — 80048 BASIC METABOLIC PNL TOTAL CA: CPT

## 2020-03-17 PROCEDURE — 74011000258 HC RX REV CODE- 258: Performed by: INTERNAL MEDICINE

## 2020-03-17 PROCEDURE — 85027 COMPLETE CBC AUTOMATED: CPT

## 2020-03-17 PROCEDURE — 74011250636 HC RX REV CODE- 250/636: Performed by: INTERNAL MEDICINE

## 2020-03-17 PROCEDURE — 36415 COLL VENOUS BLD VENIPUNCTURE: CPT

## 2020-03-17 RX ORDER — POTASSIUM CHLORIDE 20 MEQ/1
40 TABLET, EXTENDED RELEASE ORAL
Status: COMPLETED | OUTPATIENT
Start: 2020-03-17 | End: 2020-03-17

## 2020-03-17 RX ADMIN — PANTOPRAZOLE SODIUM 40 MG: 40 TABLET, DELAYED RELEASE ORAL at 05:01

## 2020-03-17 RX ADMIN — LOSARTAN POTASSIUM 100 MG: 50 TABLET, FILM COATED ORAL at 09:28

## 2020-03-17 RX ADMIN — LEVOTHYROXINE SODIUM 137 MCG: 0.11 TABLET ORAL at 05:00

## 2020-03-17 RX ADMIN — Medication 10 ML: at 05:01

## 2020-03-17 RX ADMIN — ESCITALOPRAM OXALATE 10 MG: 10 TABLET ORAL at 09:28

## 2020-03-17 RX ADMIN — HYDROCHLOROTHIAZIDE 12.5 MG: 25 TABLET ORAL at 09:28

## 2020-03-17 RX ADMIN — AMLODIPINE BESYLATE 5 MG: 5 TABLET ORAL at 09:28

## 2020-03-17 RX ADMIN — POTASSIUM CHLORIDE 40 MEQ: 1500 TABLET, EXTENDED RELEASE ORAL at 12:09

## 2020-03-17 RX ADMIN — Medication 10 ML: at 14:25

## 2020-03-17 RX ADMIN — Medication 10 ML: at 21:38

## 2020-03-17 RX ADMIN — CEFTRIAXONE 1 G: 1 INJECTION, POWDER, FOR SOLUTION INTRAMUSCULAR; INTRAVENOUS at 17:39

## 2020-03-17 RX ADMIN — Medication 400 MG: at 09:28

## 2020-03-17 RX ADMIN — HYDROCODONE BITARTRATE AND ACETAMINOPHEN 1 TABLET: 5; 325 TABLET ORAL at 00:04

## 2020-03-17 NOTE — PROGRESS NOTES
Hospitalist Note     Admit Date:  3/15/2020  4:10 PM   Name:  Sandie Kohler   Age:  80 y.o.  :  1935   MRN:  [id number]   PCP:  Bijan Montoya MD  Treatment Team: Attending Provider: Dayana Meraz MD; Utilization Review: Christine Post; Care Manager: Susan Duran RN    HPI/Subjective:   Mr. Yosvany Bentley is an 81 y/o WM with a h/o CVAs, AFib on Eliquis, HTN, CAD s/p CABG who presented on 3/15 with abdominal pain. CT scan showed left rectus sheath hematoma. Hb 11.     3/17: In bed, chronically ill appearing, no complaints. VS remain normal but Hb down to 9.5 (11 at admission). Wife at bedside. No other complaints  Objective:     Patient Vitals for the past 24 hrs:   Temp Pulse Resp BP SpO2   20 0828 98.8 °F (37.1 °C) 71 20 118/63 97 %   20 0439 98.1 °F (36.7 °C) 70 20 147/75 95 %   20 2351 98 °F (36.7 °C) 73 20 135/68 94 %   20 1957 98.4 °F (36.9 °C) 80 18 136/79 95 %   20 1709 99.6 °F (37.6 °C) 79 18 140/71 92 %   20 1232 98.1 °F (36.7 °C) 84 18 110/60 94 %     Oxygen Therapy  O2 Sat (%): 97 % (20)  Pulse via Oximetry: 82 beats per minute (03/15/20 2021)  O2 Device: Nasal cannula (20)  O2 Flow Rate (L/min): 2 l/min (20)  ETCO2 (mmHg): 3 mmHg (20)    Estimated body mass index is 29.95 kg/m² as calculated from the following:    Height as of this encounter: 6' 1\" (1.854 m). Weight as of this encounter: 103 kg (227 lb). Intake/Output Summary (Last 24 hours) at 3/17/2020 1054  Last data filed at 3/16/2020 1850  Gross per 24 hour   Intake 480 ml   Output    Net 480 ml       *Note that automatically entered I/Os may not be accurate; dependent on patient compliance with collection and accurate  by techs. General:    Chronically ill appearing. Obese. NAD. CV:   RRR. No murmur, rub, or gallop. Lungs:   CTAB. No wheezing, rhonchi, or rales. Abdomen:   Soft, nondistended.  Some left sided abdominal ttp. Abdo binder lifted for exam, no obvious bruising or rashes. Extremities: Warm and dry. No cyanosis or edema. Skin:     No rashes or jaundice.    Neuro:  No gross focal deficits    Data Review:  I have reviewed all labs, meds, and studies from the last 24 hours:    Recent Results (from the past 24 hour(s))   HEMOGLOBIN    Collection Time: 03/16/20 12:50 PM   Result Value Ref Range    HGB 10.0 (L) 13.6 - 17.2 g/dL   CBC W/O DIFF    Collection Time: 03/17/20  5:52 AM   Result Value Ref Range    WBC 11.1 4.3 - 11.1 K/uL    RBC 3.52 (L) 4.23 - 5.6 M/uL    HGB 9.5 (L) 13.6 - 17.2 g/dL    HCT 29.4 (L) 41.1 - 50.3 %    MCV 83.5 79.6 - 97.8 FL    MCH 27.0 26.1 - 32.9 PG    MCHC 32.3 31.4 - 35.0 g/dL    RDW 14.3 11.9 - 14.6 %    PLATELET 976 917 - 066 K/uL    MPV 10.8 9.4 - 12.3 FL    ABSOLUTE NRBC 0.00 0.0 - 0.2 K/uL   METABOLIC PANEL, BASIC    Collection Time: 03/17/20  5:52 AM   Result Value Ref Range    Sodium 140 136 - 145 mmol/L    Potassium 3.1 (L) 3.5 - 5.1 mmol/L    Chloride 105 98 - 107 mmol/L    CO2 27 21 - 32 mmol/L    Anion gap 8 7 - 16 mmol/L    Glucose 108 (H) 65 - 100 mg/dL    BUN 32 (H) 8 - 23 MG/DL    Creatinine 2.03 (H) 0.8 - 1.5 MG/DL    GFR est AA 40 (L) >60 ml/min/1.73m2    GFR est non-AA 33 (L) >60 ml/min/1.73m2    Calcium 8.6 8.3 - 10.4 MG/DL        All Micro Results     Procedure Component Value Units Date/Time    CULTURE, URINE [[id number]]  (Abnormal) Collected:  03/15/20 7209    Order Status:  Completed Specimen:  Urine from Clean catch Updated:  03/17/20 6391     Special Requests: NO SPECIAL REQUESTS        Culture result:       >100,000 COLONIES/mL GRAM NEGATIVE RODS SUBCULTURE IN PROGRESS                  10,000 to 50,000 COLONIES/mL MIXED SKIN LAWRENCE ISOLATED          CULTURE, BLOOD [[id number]] Collected:  03/15/20 1640    Order Status:  Completed Specimen:  Blood Updated:  03/17/20 0650     Special Requests: --        LEFT  HAND       Culture result: NO GROWTH 2 DAYS       INFLUENZA A & B AG (RAPID TEST) [[id number]] Collected:  03/15/20 1640    Order Status:  Completed Specimen:  Nasopharyngeal from Nasal washing Updated:  03/15/20 1703     Influenza A Ag NEGATIVE         Comment: NEGATIVE FOR THE PRESENCE OF INFLUENZA A ANTIGEN  INFECTION DUE TO INFLUENZA A CANNOT BE RULED OUT. BECAUSE THE ANTIGEN PRESENT IN THE SAMPLE MAY BE BELOW  THE DETECTION LIMIT OF THE TEST. A NEGATIVE TEST IS PRESUMPTIVE AND IT IS RECOMMENDED THAT THESE RESULTS BE CONFIRMED BY VIRAL CULTURE OR AN FDA-CLEARED INFLUENZA A AND B MOLECULAR ASSAY. Influenza B Ag NEGATIVE         Comment: NEGATIVE FOR THE PRESENCE OF INFLUENZA B ANTIGEN  INFECTION DUE TO INFLUENZA B CANNOT BE RULED OUT. BECAUSE THE ANTIGEN PRESENT IN THE SAMPLE MAY BE BELOW  THE DETECTION LIMIT OF THE TEST. A NEGATIVE TEST IS PRESUMPTIVE AND IT IS RECOMMENDED THAT THESE RESULTS BE CONFIRMED BY VIRAL CULTURE OR AN FDA-CLEARED INFLUENZA A AND B MOLECULAR ASSAY.           Source Nasopharyngeal       CULTURE, BLOOD [[id number]]     Order Status:  Sent Specimen:  Blood           Current Meds:  Current Facility-Administered Medications   Medication Dose Route Frequency    potassium chloride (K-DUR, KLOR-CON) SR tablet 40 mEq  40 mEq Oral NOW    sodium chloride (NS) flush 5-40 mL  5-40 mL IntraVENous Q8H    sodium chloride (NS) flush 5-40 mL  5-40 mL IntraVENous PRN    acetaminophen (TYLENOL) tablet 650 mg  650 mg Oral Q6H PRN    HYDROcodone-acetaminophen (NORCO) 5-325 mg per tablet 1 Tab  1 Tab Oral Q4H PRN    ondansetron (ZOFRAN) injection 4 mg  4 mg IntraVENous Q4H PRN    bisacodyL (DULCOLAX) tablet 5 mg  5 mg Oral DAILY PRN    amLODIPine (NORVASC) tablet 5 mg  5 mg Oral DAILY    escitalopram oxalate (LEXAPRO) tablet 10 mg  10 mg Oral DAILY    hydroCHLOROthiazide (HYDRODIURIL) tablet 12.5 mg  12.5 mg Oral DAILY    levothyroxine (SYNTHROID) tablet 137 mcg  137 mcg Oral ACB    losartan (COZAAR) tablet 100 mg  100 mg Oral DAILY    magnesium oxide (MAG-OX) tablet 400 mg  400 mg Oral DAILY    pantoprazole (PROTONIX) tablet 40 mg  40 mg Oral ACB    cefTRIAXone (ROCEPHIN) 1 g in 0.9% sodium chloride (MBP/ADV) 50 mL  1 g IntraVENous Q24H       Other Studies:  No results found for this visit on 03/15/20. No results found. Assessment and Plan:     Hospital Problems as of 3/17/2020 Date Reviewed: 10/24/2019          Codes Class Noted - Resolved POA    Hypokalemia ICD-10-CM: E87.6  ICD-9-CM: 276.8  3/17/2020 - Present Unknown        * (Principal) Rectus sheath hematoma, initial encounter ICD-10-CM: S30. 1XXA  ICD-9-CM: 922.2  3/17/2020 - Present Unknown        UTI (urinary tract infection) ICD-10-CM: N39.0  ICD-9-CM: 599.0  3/15/2020 - Present Unknown        Chronic kidney disease (CKD), stage III (moderate) (HCC) ICD-10-CM: N18.3  ICD-9-CM: 585.3  9/19/2016 - Present Yes        Renal stones ICD-10-CM: N20.0  ICD-9-CM: 592.0  2/20/2014 - Present Yes        Hypertension ICD-10-CM: I10  ICD-9-CM: 401.9  4/16/2013 - Present Yes        CAD (coronary artery disease) ICD-10-CM: I25.10  ICD-9-CM: 414.00  4/16/2013 - Present Yes    Overview Addendum 9/29/2015 12:49 PM by Daxa Carr     1. CABG 2005: LIMA to LAD, SVG to diagonal, SVG to OM and SVG to RCA  2. NSTEMI (12/11/13): Peak troponin 4.66.   A. LHC: Severe 3 vessel CAD. Occluded SVG to PDA and diagonal. Stenosis in SVG to OM. B. PCI of SVG to OM: Distal body treated with 4 x 18 mm vision BMS. Prox body lesion treated with integrity 4 x 18 BMS. C. Echo: Normal LV systolic function. EF 55-60%. Mild mitral/tricuspid regurgitation. 3. Echo (9/23/14): EF 50-55%. Mild LAE. Mild mitral regurgitation.              Hypothyroidism ICD-10-CM: E03.9  ICD-9-CM: 244.9  4/16/2013 - Present Yes        Hyperlipidemia ICD-10-CM: E78.5  ICD-9-CM: 272.4  4/16/2013 - Present Yes        Osteoarthritis ICD-10-CM: M19.90  ICD-9-CM: 715.90  4/16/2013 - Present Yes        Gout ICD-10-CM: M10.9  ICD-9-CM: 274.9  4/16/2013 - Present Yes              Plan:  # Left rectus sheath hematoma   - Hb 11 --> 9.5. VS normal. CBC tomorrow. - Eliquis held   - IR vs surgery if clinical decompensation    # UTI   - GNRs in urine. Ceftriaxone. # HypoK   - Replace PO. Recheck tomorrow. # CKD   - Baseline Cr high 1s. Slightly higher, BMP tomorrow. PO intake. # H/o AFib/PPM   - Eliquis held as above. # HTN   - Norvasc, losartan, HCTZ    # GERD   - PPI    # Hypothyroidism   - Synthroid    # MDD/anxiety   - SSRI    DC planning/Dispo: Wife is caregiver, he's been bedbound for nearly 1 year. Home when able, 1-2d pendin Hb/clinical course.   Diet:  DIET CARDIAC  DVT ppx: SCDs only    Signed:  Valerie Parker MD

## 2020-03-17 NOTE — PROGRESS NOTES
Problem: Falls - Risk of  Goal: *Absence of Falls  Description: Document Katie Bejarano Fall Risk and appropriate interventions in the flowsheet. Outcome: Progressing Towards Goal  Note: Fall Risk Interventions:       Mentation Interventions: Door open when patient unattended    Medication Interventions: Patient to call before getting OOB    Elimination Interventions: Patient to call for help with toileting needs    History of Falls Interventions: Door open when patient unattended         Problem: Pressure Injury - Risk of  Goal: *Prevention of pressure injury  Description: Document Adam Scale and appropriate interventions in the flowsheet.   Outcome: Progressing Towards Goal  Note: Pressure Injury Interventions:  Sensory Interventions: Keep linens dry and wrinkle-free    Moisture Interventions: Absorbent underpads    Activity Interventions: Pressure redistribution bed/mattress(bed type)    Mobility Interventions: Pressure redistribution bed/mattress (bed type)    Nutrition Interventions: Document food/fluid/supplement intake    Friction and Shear Interventions: Apply protective barrier, creams and emollients no

## 2020-03-17 NOTE — PROGRESS NOTES
Interdisciplinary Rounds completed. Nursing, Case Management, and Physician  present. Plan of care reviewed and updated. Will discharge home with wife if HGB remains stable.

## 2020-03-17 NOTE — PROGRESS NOTES
Hourly rounds done. Pt c/o pain, medicated per MAR. Denies nausea, vomiting. Wife at bedside. All needs met at this time.

## 2020-03-17 NOTE — PROGRESS NOTES
Hourly rounds completed throughout this shift. Pt had no complaints this shift. Wife at bedside. Pt resting in bed; denies needs at this time. Will continue to monitor and report to oncoming night shift nurse.

## 2020-03-18 VITALS
TEMPERATURE: 98.2 F | DIASTOLIC BLOOD PRESSURE: 60 MMHG | RESPIRATION RATE: 18 BRPM | SYSTOLIC BLOOD PRESSURE: 129 MMHG | BODY MASS INDEX: 30.09 KG/M2 | OXYGEN SATURATION: 96 % | HEIGHT: 73 IN | WEIGHT: 227 LBS | HEART RATE: 88 BPM

## 2020-03-18 LAB
ANION GAP SERPL CALC-SCNC: 7 MMOL/L (ref 7–16)
BUN SERPL-MCNC: 36 MG/DL (ref 8–23)
CALCIUM SERPL-MCNC: 8.6 MG/DL (ref 8.3–10.4)
CHLORIDE SERPL-SCNC: 106 MMOL/L (ref 98–107)
CO2 SERPL-SCNC: 28 MMOL/L (ref 21–32)
CREAT SERPL-MCNC: 2.06 MG/DL (ref 0.8–1.5)
ERYTHROCYTE [DISTWIDTH] IN BLOOD BY AUTOMATED COUNT: 14.2 % (ref 11.9–14.6)
GLUCOSE SERPL-MCNC: 95 MG/DL (ref 65–100)
HCT VFR BLD AUTO: 30.4 % (ref 41.1–50.3)
HGB BLD-MCNC: 9.6 G/DL (ref 13.6–17.2)
MCH RBC QN AUTO: 26.7 PG (ref 26.1–32.9)
MCHC RBC AUTO-ENTMCNC: 31.6 G/DL (ref 31.4–35)
MCV RBC AUTO: 84.7 FL (ref 79.6–97.8)
NRBC # BLD: 0 K/UL (ref 0–0.2)
PLATELET # BLD AUTO: 270 K/UL (ref 150–450)
PMV BLD AUTO: 10.9 FL (ref 9.4–12.3)
POTASSIUM SERPL-SCNC: 3.6 MMOL/L (ref 3.5–5.1)
RBC # BLD AUTO: 3.59 M/UL (ref 4.23–5.6)
SODIUM SERPL-SCNC: 141 MMOL/L (ref 136–145)
WBC # BLD AUTO: 10.7 K/UL (ref 4.3–11.1)

## 2020-03-18 PROCEDURE — 74011250637 HC RX REV CODE- 250/637: Performed by: INTERNAL MEDICINE

## 2020-03-18 PROCEDURE — 77030040361 HC SLV COMPR DVT MDII -B

## 2020-03-18 PROCEDURE — 85027 COMPLETE CBC AUTOMATED: CPT

## 2020-03-18 PROCEDURE — 36415 COLL VENOUS BLD VENIPUNCTURE: CPT

## 2020-03-18 PROCEDURE — 80048 BASIC METABOLIC PNL TOTAL CA: CPT

## 2020-03-18 RX ORDER — CIPROFLOXACIN 500 MG/1
500 TABLET ORAL DAILY
Qty: 6 TAB | Refills: 0 | Status: SHIPPED | OUTPATIENT
Start: 2020-03-19 | End: 2020-03-25

## 2020-03-18 RX ORDER — CIPROFLOXACIN 500 MG/1
500 TABLET ORAL EVERY 24 HOURS
Status: DISCONTINUED | OUTPATIENT
Start: 2020-03-18 | End: 2020-03-18 | Stop reason: HOSPADM

## 2020-03-18 RX ORDER — CIPROFLOXACIN 500 MG/1
500 TABLET ORAL EVERY 12 HOURS
Status: DISCONTINUED | OUTPATIENT
Start: 2020-03-18 | End: 2020-03-18

## 2020-03-18 RX ADMIN — Medication 10 ML: at 06:06

## 2020-03-18 RX ADMIN — PANTOPRAZOLE SODIUM 40 MG: 40 TABLET, DELAYED RELEASE ORAL at 06:00

## 2020-03-18 RX ADMIN — Medication 400 MG: at 09:14

## 2020-03-18 RX ADMIN — AMLODIPINE BESYLATE 5 MG: 5 TABLET ORAL at 09:14

## 2020-03-18 RX ADMIN — ESCITALOPRAM OXALATE 10 MG: 10 TABLET ORAL at 09:14

## 2020-03-18 RX ADMIN — LEVOTHYROXINE SODIUM 137 MCG: 0.11 TABLET ORAL at 06:00

## 2020-03-18 RX ADMIN — HYDROCHLOROTHIAZIDE 12.5 MG: 25 TABLET ORAL at 09:14

## 2020-03-18 RX ADMIN — CIPROFLOXACIN 500 MG: 500 TABLET, FILM COATED ORAL at 09:14

## 2020-03-18 RX ADMIN — LOSARTAN POTASSIUM 100 MG: 50 TABLET, FILM COATED ORAL at 09:14

## 2020-03-18 NOTE — PROGRESS NOTES
Pt has been discharged home with wife. Hourly rounds completed. Pt had no complaints. Denies pain,  Denies needs at this time. Discharge paperwork and instructions handed to pt and wife. Opportunity for questions provided. Peripheral I.V removed with no bleeding present. All needs met at this time.

## 2020-03-18 NOTE — PROGRESS NOTES
All hourly rounds/assessments completed per this shift. Pt. Turned and repositioned throughout the night. SCD's applied. All needs met at this time. Bed locked/low. Personal belongings within reach. Call light within reach. Pt. Denies any complaints of pain. Wife at pt's bedside. Bedside shift report will be given to the oncoming nurse.

## 2020-03-18 NOTE — DISCHARGE SUMMARY
Hospitalist Discharge Summary     Admit Date:  3/15/2020  4:10 PM   Name:  Michelle Nam   Age:  80 y.o.  :  1935   MRN:  [id number]   PCP:  Erinn Lane MD  Treatment Team: Attending Provider: Nelly Munoz MD; Utilization Review: Edilberto Partida; Care Manager: Kristian Ang RN; Primary Nurse: Nikkie Orta RN    Problem List for this Hospitalization:  Hospital Problems as of 3/18/2020 Date Reviewed: 10/24/2019          Codes Class Noted - Resolved POA    Hypokalemia ICD-10-CM: E87.6  ICD-9-CM: 276.8  3/17/2020 - Present Unknown        * (Principal) Rectus sheath hematoma, initial encounter ICD-10-CM: S30. 1XXA  ICD-9-CM: 922.2  3/17/2020 - Present Unknown        UTI (urinary tract infection) ICD-10-CM: N39.0  ICD-9-CM: 599.0  3/15/2020 - Present Unknown        Chronic kidney disease (CKD), stage III (moderate) (HCC) ICD-10-CM: N18.3  ICD-9-CM: 585.3  2016 - Present Yes        Renal stones ICD-10-CM: N20.0  ICD-9-CM: 592.0  2014 - Present Yes        Hypertension ICD-10-CM: I10  ICD-9-CM: 401.9  2013 - Present Yes        CAD (coronary artery disease) ICD-10-CM: I25.10  ICD-9-CM: 414.00  2013 - Present Yes    Overview Addendum 2015 12:49 PM by Bill Hernandez     1. CABG 2005: LIMA to LAD, SVG to diagonal, SVG to OM and SVG to RCA  2. NSTEMI (13): Peak troponin 4.66.   A. LHC: Severe 3 vessel CAD. Occluded SVG to PDA and diagonal. Stenosis in SVG to OM. B. PCI of SVG to OM: Distal body treated with 4 x 18 mm vision BMS. Prox body lesion treated with integrity 4 x 18 BMS. C. Echo: Normal LV systolic function. EF 55-60%. Mild mitral/tricuspid regurgitation. 3. Echo (14): EF 50-55%. Mild LAE. Mild mitral regurgitation.              Hypothyroidism ICD-10-CM: E03.9  ICD-9-CM: 244.9  2013 - Present Yes        Hyperlipidemia ICD-10-CM: E78.5  ICD-9-CM: 272.4  2013 - Present Yes        Osteoarthritis ICD-10-CM: M19.90  ICD-9-CM: 715.90  2013 - Present Yes        Gout ICD-10-CM: M10.9  ICD-9-CM: 274.9  4/16/2013 - Present Yes                Admission HPI from 3/15/2020:    \" [place] is a 80 y.o. male who was brought to emergency room because of abdominal pain for the past 3 days.      Patient has been taking Eliquis, for his atrial fibrillation. Denies any accident or injury. Has been having this abdominal pain in the left lower quadrant area for the past 3 days. He did not notice any cut on the abdominal wall. Did not notice any expansion of the area.      In the emergency room he was found to have hematoma in the rectus sheath. There is no hypotension. Work-up also found pyuria. Patient has no fever.      No chest pain. Some shortness of breath but this has been his baseline according to the daughter.      Hospitalist service is requested to admit the patient. [facility] Hospital Course:  Mr. Magdalena Lundberg is an 79 y/o WM with a h/o CVAs, AFib on Eliquis, HTN, CAD s/p CABG who presented on 3/15 with abdominal pain. CT scan showed left rectus sheath hematoma. Hb 11.  eliquis held. Dropped some but stabilized even in spite of blood draws. No evidence of ongoing bleeding. Stable enough for discharge home. Current with home health already. Disposition: Home Health Care Svc  Activity: PT/OT per Home Health  Diet: DIET CARDIAC Regular  Code Status: Full Code    Follow Up Orders: Follow-up Appointments   Procedures    FOLLOW UP VISIT Appointment in: One Week PCP for follow up     PCP for follow up     Standing Status:   Standing     Number of Occurrences:   1     Order Specific Question:   Appointment in     Answer: One Week       Follow-up Information     Follow up With Specialties Details Why Contact Info    Gerry Flanagan MD Family Practice In 1 week follow up, repeat labwork: CBC, [address]  [phone]            Discharge meds at bottom of this note. Plan was discussed with pt. All questions answered. Patient was stable at time of discharge. Patient will call a physician or return if any concerns. Discharge summary and encounter summary was sent to PCP electronically via \"Comm Mgt\" link in [facility], if possible. Diagnostic Imaging/Tests:   Ct Abd Pelv W Cont    Result Date: 3/15/2020  CT abdomen and pelvis with contrast COMPARISON: January 16, 2017. INDICATION: Left lower quadrant pain. TECHNIQUE: CT imaging was performed of the abdomen and pelvis following the uncomplicated administration of intravenous contrast (Isovue 370, 100 mL). Oral contrast was administered. Radiation dose reduction techniques were used for this study:  Our CT scanners use one or all of the following: Automated exposure control, adjustment of the mA and/or kVp according to patient's size, iterative reconstruction. FINDINGS: There is trace left pleural effusion, chronic. Cardiac cardiac pacer wires are partially seen. Abdomen findings: Gallbladder is surgically absent. The liver, pancreas, and spleen, and adrenal glands are unremarkable. Abdominal aorta is normal in course and caliber with atherosclerotic calcification. Stomach is normal in contour. Small bowel loops are normal in caliber. There is no small bowel obstruction. Left kidney is atrophic. There is chronic dilatation of the left renal collecting system. There are bilateral nonobstructing renal calculi. There is a stable 4.5 cm parapelvic right renal cyst. Additional 12 mm cyst at the lower pole of the right kidney is absent. Pelvic findings: There is a left rectus sheath hematoma, measuring up to 4.0 x 6.5 cm. Prostate gland is mildly enlarged. There is mild urinary bladder wall thickening. The colon is normal in caliber. Appendix is absent. There is no free air or free fluid. There are degenerative changes of the spine. Bones are osteopenic. IMPRESSION: 1. Left rectus sheath hematoma, measuring up to 4.5 x 6.0 cm. 2. Bilateral nonobstructing renal calculi.  Chronic dilatation of the left renal collecting system. Findings are similar to 2017 study. 3. Mild urinary bladder wall thickening, likely secondary to bladder outlet obstruction. Prostate gland mildly enlarged. 4. Negative for colitis, diverticulitis or bowel obstruction. Xr Chest Port    Result Date: 3/15/2020  PORTABLE CHEST, March 15, 2020 at 1808 hours CLINICAL HISTORY:  Shortness of breath, fever, and left back pain. COMPARISON:  February 6, 2019. FINDINGS:  AP erect image demonstrates no confluent infiltrate or significant pleural fluid. The heart size is within normal limits status post CABG without evidence of congestive heart failure or pneumothorax. The bony thorax appears intact on this view. There are overlying radiopaque support devices. IMPRESSION:  NO ACUTE CARDIOPULMONARY DISEASE IDENTIFIED. Echocardiogram results:  No results found for this visit on 03/15/20. Procedures done this admission:  * No surgery found *    All Micro Results     Procedure Component Value Units Date/Time    CULTURE, URINE [[id number]]  (Abnormal) Collected:  03/15/20 1726    Order Status:  Completed Specimen:  Urine from Clean catch Updated:  03/18/20 0735     Special Requests: NO SPECIAL REQUESTS        Culture result:       >100,000 COLONIES/mL GRAM NEGATIVE RODS IDENTIFICATION AND SUSCEPTIBILITY TO FOLLOW                  10,000 to 50,000 COLONIES/mL MIXED SKIN LAWRENCE ISOLATED          CULTURE, BLOOD [[id number]] Collected:  03/15/20 1640    Order Status:  Completed Specimen:  Blood Updated:  03/18/20 0640     Special Requests: --        LEFT  HAND       Culture result: NO GROWTH 3 DAYS       INFLUENZA A & B AG (RAPID TEST) [[id number]] Collected:  03/15/20 1640    Order Status:  Completed Specimen:  Nasopharyngeal from Nasal washing Updated:  03/15/20 1703     Influenza A Ag NEGATIVE         Comment: NEGATIVE FOR THE PRESENCE OF INFLUENZA A ANTIGEN  INFECTION DUE TO INFLUENZA A CANNOT BE RULED OUT.   BECAUSE THE ANTIGEN PRESENT IN THE SAMPLE MAY BE BELOW  THE DETECTION LIMIT OF THE TEST. A NEGATIVE TEST IS PRESUMPTIVE AND IT IS RECOMMENDED THAT THESE RESULTS BE CONFIRMED BY VIRAL CULTURE OR AN FDA-CLEARED INFLUENZA A AND B MOLECULAR ASSAY. Influenza B Ag NEGATIVE         Comment: NEGATIVE FOR THE PRESENCE OF INFLUENZA B ANTIGEN  INFECTION DUE TO INFLUENZA B CANNOT BE RULED OUT. BECAUSE THE ANTIGEN PRESENT IN THE SAMPLE MAY BE BELOW  THE DETECTION LIMIT OF THE TEST. A NEGATIVE TEST IS PRESUMPTIVE AND IT IS RECOMMENDED THAT THESE RESULTS BE CONFIRMED BY VIRAL CULTURE OR AN FDA-CLEARED INFLUENZA A AND B MOLECULAR ASSAY.           Source Nasopharyngeal       CULTURE, BLOOD [[id number]]     Order Status:  Sent Specimen:  Blood           Labs: Results:       BMP, Mg, Phos Recent Labs     03/18/20  0642 03/17/20  0552 03/16/20  0658    140 138   K 3.6 3.1* 3.3*    105 103   CO2 28 27 27   AGAP 7 8 8   BUN 36* 32* 26*   CREA 2.06* 2.03* 1.81*   CA 8.6 8.6 8.4   GLU 95 108* 116*      CBC Recent Labs     03/18/20  0642 03/17/20  0552 03/16/20  1250 03/16/20  0658 03/15/20  1640   WBC 10.7 11.1  --  13.4* 12.9*   RBC 3.59* 3.52*  --  3.82* 4.11*   HGB 9.6* 9.5* 10.0* 10.3* 11.0*   HCT 30.4* 29.4*  --  32.0* 34.4*    270  --  274 287   GRANS  --   --   --  63 69   LYMPH  --   --   --  25 20   EOS  --   --   --  1 2   MONOS  --   --   --  10 8   BASOS  --   --   --  0 0   IG  --   --   --  1 1   ANEU  --   --   --  8.5* 9.0*   ABL  --   --   --  3.3 2.6   SUMIT  --   --   --  0.2 0.2   ABM  --   --   --  1.3 1.1   ABB  --   --   --  0.1 0.0   AIG  --   --   --  0.1 0.1      LFT Recent Labs     03/15/20  1640   SGOT 16   ALT 28   AP 81   TP 7.2   ALB 2.9*   GLOB 4.3*   AGRAT 0.7*      Cardiac Testing Lab Results   Component Value Date/Time    BNP 97 (H) 02/04/2019 09:35 PM     06/18/2014 01:45 PM    CK 69 03/28/2018 12:59 PM     (H) 12/11/2013 09:10 PM     (H) 12/11/2013 03:10 PM Troponin-I, Qt. <0.02 (L) 03/15/2020 04:40 PM    Troponin-I, Qt. 0.02 02/04/2019 09:35 PM    Troponin-I, Qt. <0.02 (L) 03/28/2018 10:43 PM      Coagulation Tests Lab Results   Component Value Date/Time    Prothrombin time 16.6 (H) 03/15/2020 08:45 PM    INR 1.3 03/15/2020 08:45 PM    aPTT 37.5 (H) 03/15/2020 08:45 PM    aPTT 44.5 (H) 06/19/2014 11:12 AM    aPTT 50.2 (H) 06/19/2014 04:00 AM      A1c Lab Results   Component Value Date/Time    Hemoglobin A1c 5.7 03/27/2018 05:35 AM      Lipid Panel Lab Results   Component Value Date/Time    Cholesterol, total 104 01/30/2019 12:23 PM    HDL Cholesterol 30 (L) 01/30/2019 12:23 PM    LDL, calculated 49 01/30/2019 12:23 PM    VLDL, calculated 25 01/30/2019 12:23 PM    Triglyceride 125 01/30/2019 12:23 PM    CHOL/HDL Ratio 2.7 03/27/2018 05:35 AM      Thyroid Panel Lab Results   Component Value Date/Time    TSH 4.000 10/24/2019 03:50 PM    TSH 3.380 02/04/2019 09:35 PM    T4, Free 1.17 06/14/2018 03:41 PM    T4, Free 1.2 03/28/2018 11:54 AM        Most Recent UA Lab Results   Component Value Date/Time    Color YELLOW 03/15/2020 05:27 PM    Appearance TURBID 03/15/2020 05:27 PM    Specific gravity 1.012 03/15/2020 05:27 PM    pH (UA) 6.0 03/15/2020 05:27 PM    Protein 100 (A) 03/15/2020 05:27 PM    Glucose NEGATIVE  03/15/2020 05:27 PM    Ketone NEGATIVE  03/15/2020 05:27 PM    Bilirubin NEGATIVE  03/15/2020 05:27 PM    Blood LARGE (A) 03/15/2020 05:27 PM    Urobilinogen 0.2 03/15/2020 05:27 PM    Nitrites NEGATIVE  03/15/2020 05:27 PM    Leukocyte Esterase LARGE (A) 03/15/2020 05:27 PM    WBC >100 03/15/2020 05:27 PM    RBC >100 03/15/2020 05:27 PM    Epithelial cells 5-10 03/26/2018 04:27 PM    Bacteria 4+ (H) 03/15/2020 05:27 PM    Casts 3-5 03/26/2018 04:27 PM    Crystals, urine 0 01/16/2017 01:08 AM    Mucus 0 01/16/2017 01:08 AM    Other observations RESULTS VERIFIED MANUALLY 03/15/2020 05:27 PM        Allergies   Allergen Reactions    Other Medication Other (comments)     Wife reports an unknown reaction to an unknown pain medicine. Cannot recall name or details.         Immunization History   Administered Date(s) Administered    Influenza High Dose Vaccine PF 10/04/2017, 10/14/2018    Influenza Vaccine 09/17/2015    Influenza Vaccine (Tri) Adjuvanted 10/24/2019    Influenza Vaccine PF 10/04/2017    Pneumococcal Conjugate (PCV-13) 03/11/2015    Pneumococcal Polysaccharide (PPSV-23) 06/20/2016    TB Skin Test (PPD) Intradermal 05/15/2016, 03/27/2018, 02/07/2019    Zoster Vaccine, Live 10/15/2018       All Labs from Last 24 Hrs:  Recent Results (from the past 24 hour(s))   CBC W/O DIFF    Collection Time: 03/18/20  6:42 AM   Result Value Ref Range    WBC 10.7 4.3 - 11.1 K/uL    RBC 3.59 (L) 4.23 - 5.6 M/uL    HGB 9.6 (L) 13.6 - 17.2 g/dL    HCT 30.4 (L) 41.1 - 50.3 %    MCV 84.7 79.6 - 97.8 FL    MCH 26.7 26.1 - 32.9 PG    MCHC 31.6 31.4 - 35.0 g/dL    RDW 14.2 11.9 - 14.6 %    PLATELET 121 253 - 626 K/uL    MPV 10.9 9.4 - 12.3 FL    ABSOLUTE NRBC 0.00 0.0 - 0.2 K/uL   METABOLIC PANEL, BASIC    Collection Time: 03/18/20  6:42 AM   Result Value Ref Range    Sodium 141 136 - 145 mmol/L    Potassium 3.6 3.5 - 5.1 mmol/L    Chloride 106 98 - 107 mmol/L    CO2 28 21 - 32 mmol/L    Anion gap 7 7 - 16 mmol/L    Glucose 95 65 - 100 mg/dL    BUN 36 (H) 8 - 23 MG/DL    Creatinine 2.06 (H) 0.8 - 1.5 MG/DL    GFR est AA 40 (L) >60 ml/min/1.73m2    GFR est non-AA 33 (L) >60 ml/min/1.73m2    Calcium 8.6 8.3 - 10.4 MG/DL       Current Med List in Hospital:   Current Facility-Administered Medications   Medication Dose Route Frequency    ciprofloxacin HCl (CIPRO) tablet 500 mg  500 mg Oral Q24H    sodium chloride (NS) flush 5-40 mL  5-40 mL IntraVENous Q8H    sodium chloride (NS) flush 5-40 mL  5-40 mL IntraVENous PRN    acetaminophen (TYLENOL) tablet 650 mg  650 mg Oral Q6H PRN    HYDROcodone-acetaminophen (NORCO) 5-325 mg per tablet 1 Tab  1 Tab Oral Q4H PRN    ondansetron [facility]) injection 4 mg  4 mg IntraVENous Q4H PRN    bisacodyL (DULCOLAX) tablet 5 mg  5 mg Oral DAILY PRN    amLODIPine (NORVASC) tablet 5 mg  5 mg Oral DAILY    escitalopram oxalate (LEXAPRO) tablet 10 mg  10 mg Oral DAILY    hydroCHLOROthiazide (HYDRODIURIL) tablet 12.5 mg  12.5 mg Oral DAILY    levothyroxine (SYNTHROID) tablet 137 mcg  137 mcg Oral ACB    losartan (COZAAR) tablet 100 mg  100 mg Oral DAILY    magnesium oxide (MAG-OX) tablet 400 mg  400 mg Oral DAILY    pantoprazole (PROTONIX) tablet 40 mg  40 mg Oral ACB       Discharge Exam:  Patient Vitals for the past 24 hrs:   Temp Pulse Resp BP SpO2   03/18/20 0742 98.4 °F (36.9 °C) 85 18 133/59 95 %   03/18/20 0444 98 °F (36.7 °C) 80 18 144/68 98 %   03/18/20 0006 99 °F (37.2 °C) 69 18 132/61 96 %   03/17/20 1939 98.2 °F (36.8 °C) 74 18 138/68 95 %   03/17/20 1737 98.1 °F (36.7 °C) 77 18 115/60 97 %   03/17/20 1222 98 °F (36.7 °C) 74 18 95/62 94 %   03/17/20 0828 98.8 °F (37.1 °C) 71 20 118/63 97 %     Oxygen Therapy  O2 Sat (%): 95 % (03/18/20 0742)  Pulse via Oximetry: 82 beats per minute (03/15/20 2021)  O2 Device: Nasal cannula (03/16/20 0136)  O2 Flow Rate (L/min): 2 l/min (03/16/20 0136)  ETCO2 (mmHg): 3 mmHg (03/17/20 0828)    Estimated body mass index is 29.95 kg/m² as calculated from the following:    Height as of this encounter: 6' 1\" (1.854 m). Weight as of this encounter: 103 kg (227 lb). Intake/Output Summary (Last 24 hours) at 3/18/2020 0825  Last data filed at 3/17/2020 1830  Gross per 24 hour   Intake 790 ml   Output    Net 790 ml       *Note that automatically entered I/Os may not be accurate; dependent on patient compliance with collection and accurate  by assistants. General:    Well nourished. Alert. Eyes:   Normal sclerae. Extraocular movements intact. ENT:  Normocephalic, atraumatic. Moist mucous membranes  CV:   Regular rate and rhythm. No murmur, rub, or gallop.     Lungs:  Clear to auscultation bilaterally. No wheezing, rhonchi, or rales. Abdomen: Soft, nontender, nondistended. Extremities: Warm and dry. No cyanosis or edema. Neurologic: CN II-XII grossly intact. No gross focal deficits   Skin:     No rashes or jaundice. Psych:  Normal mood and affect. Discharge Info:   Current Discharge Medication List      START taking these medications    Details   ciprofloxacin HCl (CIPRO) 500 mg tablet Take 1 Tab by mouth daily for 6 days. Start 3/19, morning  Qty: 6 Tab, Refills: 0         CONTINUE these medications which have NOT CHANGED    Details   hydroCHLOROthiazide (HYDRODIURIL) 25 mg tablet TAKE 1/2 TABLET BY MOUTH DAILY  Qty: 45 Tab, Refills: 3    Associated Diagnoses: Essential hypertension      pantoprazole (PROTONIX) 40 mg tablet TAKE 1 TABLET BY MOUTH EVERY DAY  Qty: 90 Tab, Refills: 3    Associated Diagnoses: Gastroesophageal reflux disease, esophagitis presence not specified      losartan (COZAAR) 100 mg tablet TAKE 1 TABLET BY MOUTH EVERY DAY  Qty: 90 Tab, Refills: 2    Associated Diagnoses: Essential hypertension      !! cyanocobalamin (VITAMIN B12) 1,000 mcg/mL injection 1 ML INJECTED INTRAMUSCULARLY ONCE MONTHLY  Qty: 3 mL, Refills: 1    Associated Diagnoses: B12 deficiency      varicella-zoster recombinant, PF, (SHINGRIX, PF,) 50 mcg/0.5 mL susr injection 0.5mL by IntraMUSCular route once now and then repeat in 2-6 months  Qty: 0.5 mL, Refills: 1    Associated Diagnoses: Encounter for immunization      ergocalciferol (VITAMIN D2) 50,000 unit capsule Take 1 Cap by mouth every seven (7) days. Qty: 4 Cap, Refills: 12    Associated Diagnoses: Vitamin D deficiency      escitalopram oxalate (LEXAPRO) 10 mg tablet TAKE 1 TABLET BY MOUTH EVERY DAY IN THE MORNING  Qty: 90 Tab, Refills: 3    Associated Diagnoses: Depression, unspecified depression type      nitroglycerin (NITROSTAT) 0.4 mg SL tablet 1 Tab by SubLINGual route every five (5) minutes as needed for Chest Pain.   Qty: 1 Bottle, Refills: 3      amLODIPine (NORVASC) 5 mg tablet Take 1 Tab by mouth daily. Qty: 90 Tab, Refills: 3      !! cyanocobalamin (VITAMIN B-12) 1,000 mcg/mL injection 1 ml injected intramuscularly once monthly  Qty: 10 mL, Refills: 0    Associated Diagnoses: B12 deficiency      magnesium oxide (MAG-OX) 400 mg tablet Take 1 Tab by mouth daily. Qty: 90 Tab, Refills: 3    Associated Diagnoses: Slow transit constipation      !! OTHER Gel mattress overlay  Qty: 1 Each, Refills: 0    Associated Diagnoses: History of CVA (cerebrovascular accident)      nystatin (MYCOSTATIN) powder Apply  to affected area three (3) times daily as needed for Other (rash). Qty: 60 g, Refills: 3    Associated Diagnoses: Intertriginous dermatitis associated with moisture      levothyroxine (SYNTHROID) 137 mcg tablet Take  by mouth Daily (before breakfast). !! OTHER Trapeze bar for bed mobility. Qty: 1 Each, Refills: 0    Associated Diagnoses: Bed confinement status      !! OTHER Wedges for propping in bed. Qty: 2 Each, Refills: 0    Associated Diagnoses: History of CVA (cerebrovascular accident); Bed confinement status      colchicine (MITIGARE) 0.6 mg capsule Take 0.6 mg by mouth as needed. !! - Potential duplicate medications found. Please discuss with provider. STOP taking these medications       amoxicillin (AMOXIL) 875 mg tablet Comments:   Reason for Stopping:         ELIQUIS 2.5 mg tablet Comments:   Reason for Stopping:                 Time spent in patient discharge planning and coordination 35 minutes.     Signed:  Enedina Luevano MD

## 2020-03-18 NOTE — PROGRESS NOTES
Care Management Interventions  PCP Verified by CM: Yes(Dr. Maria Fernanda Suh)  Mode of Transport at Discharge: Other (see comment)(To be determined based on patient needs. )  Transition of Care Consult (CM Consult): Home Health, Discharge Planning  [address] [address]: No  Reason Outside [place]: Patient already serviced by other home care/hospice agency  Discharge Durable Medical Equipment: Yes(Walker, shower chair, cane, wheelchair, elaine lift, hospital bed)  Physical Therapy Consult: No  Occupational Therapy Consult: No  Speech Therapy Consult: No  Current Support Network: Own Home, Lives with Spouse, Family Lives Nearby  Confirm Follow Up Transport: Family  Discharge Location  Discharge Placement: Home with home health    Patient to discharge home today with [facility] as previously ordered. Patient will be transported home by [facility] between 1:30 and 2:00 per family request [phone]. All milestones have been met for discharge.

## 2020-03-19 ENCOUNTER — PATIENT OUTREACH (OUTPATIENT)
Dept: CASE MANAGEMENT | Age: 85
End: 2020-03-19

## 2020-03-19 NOTE — PROGRESS NOTES
This note will not be viewable in [address] Initial [facility] outreach attempt to patient's home number was unsuccessful. Left message to return call. Will attempt second outreach within 24 hours.

## 2020-03-20 ENCOUNTER — PATIENT OUTREACH (OUTPATIENT)
Dept: CASE MANAGEMENT | Age: 85
End: 2020-03-20

## 2020-03-20 LAB
BACTERIA SPEC CULT: ABNORMAL
BACTERIA SPEC CULT: NORMAL
SERVICE CMNT-IMP: ABNORMAL
SERVICE CMNT-IMP: NORMAL

## 2020-03-20 NOTE — PROGRESS NOTES
This note will not be viewable in [address] Second AYANNA outreach attempt made to patient's home number was unsuccessful. Left message to return call. Will attempt third outreach within 5 business days.

## 2020-03-23 ENCOUNTER — PATIENT OUTREACH (OUTPATIENT)
Dept: CASE MANAGEMENT | Age: 85
End: 2020-03-23

## 2020-07-27 ENCOUNTER — HOSPITAL ENCOUNTER (INPATIENT)
Age: 85
LOS: 3 days | Discharge: HOME HEALTH | DRG: 243 | End: 2020-07-31
Attending: EMERGENCY MEDICINE | Admitting: FAMILY MEDICINE
Payer: MEDICARE

## 2020-07-27 DIAGNOSIS — T82.7XXA PACEMAKER INFECTION, INITIAL ENCOUNTER (HCC): Primary | ICD-10-CM

## 2020-07-27 DIAGNOSIS — I25.10 CORONARY ARTERY DISEASE INVOLVING NATIVE CORONARY ARTERY OF NATIVE HEART WITHOUT ANGINA PECTORIS: ICD-10-CM

## 2020-07-27 DIAGNOSIS — L03.90 CELLULITIS, UNSPECIFIED CELLULITIS SITE: ICD-10-CM

## 2020-07-27 LAB
ALBUMIN SERPL-MCNC: 2.8 G/DL (ref 3.2–4.6)
ALBUMIN/GLOB SERPL: 0.5 {RATIO} (ref 1.2–3.5)
ALP SERPL-CCNC: 88 U/L (ref 50–136)
ALT SERPL-CCNC: 34 U/L (ref 12–65)
ANION GAP SERPL CALC-SCNC: 4 MMOL/L (ref 7–16)
AST SERPL-CCNC: 35 U/L (ref 15–37)
BASOPHILS # BLD: 0.1 K/UL (ref 0–0.2)
BASOPHILS NFR BLD: 1 % (ref 0–2)
BILIRUB SERPL-MCNC: 0.4 MG/DL (ref 0.2–1.1)
BUN SERPL-MCNC: 32 MG/DL (ref 8–23)
CALCIUM SERPL-MCNC: 8.7 MG/DL (ref 8.3–10.4)
CHLORIDE SERPL-SCNC: 102 MMOL/L (ref 98–107)
CO2 SERPL-SCNC: 30 MMOL/L (ref 21–32)
CREAT SERPL-MCNC: 1.7 MG/DL (ref 0.8–1.5)
DIFFERENTIAL METHOD BLD: ABNORMAL
EOSINOPHIL # BLD: 0.4 K/UL (ref 0–0.8)
EOSINOPHIL NFR BLD: 3 % (ref 0.5–7.8)
ERYTHROCYTE [DISTWIDTH] IN BLOOD BY AUTOMATED COUNT: 15.1 % (ref 11.9–14.6)
GLOBULIN SER CALC-MCNC: 5.1 G/DL (ref 2.3–3.5)
GLUCOSE SERPL-MCNC: 211 MG/DL (ref 65–100)
HCT VFR BLD AUTO: 40 % (ref 41.1–50.3)
HGB BLD-MCNC: 12.5 G/DL (ref 13.6–17.2)
IMM GRANULOCYTES # BLD AUTO: 0.1 K/UL (ref 0–0.5)
IMM GRANULOCYTES NFR BLD AUTO: 1 % (ref 0–5)
LYMPHOCYTES # BLD: 2.2 K/UL (ref 0.5–4.6)
LYMPHOCYTES NFR BLD: 17 % (ref 13–44)
MCH RBC QN AUTO: 26 PG (ref 26.1–32.9)
MCHC RBC AUTO-ENTMCNC: 31.3 G/DL (ref 31.4–35)
MCV RBC AUTO: 83.3 FL (ref 79.6–97.8)
MONOCYTES # BLD: 0.9 K/UL (ref 0.1–1.3)
MONOCYTES NFR BLD: 7 % (ref 4–12)
NEUTS SEG # BLD: 9.7 K/UL (ref 1.7–8.2)
NEUTS SEG NFR BLD: 73 % (ref 43–78)
NRBC # BLD: 0 K/UL (ref 0–0.2)
PLATELET # BLD AUTO: 343 K/UL (ref 150–450)
PMV BLD AUTO: 11.3 FL (ref 9.4–12.3)
POTASSIUM SERPL-SCNC: 4.3 MMOL/L (ref 3.5–5.1)
PROT SERPL-MCNC: 7.9 G/DL (ref 6.3–8.2)
RBC # BLD AUTO: 4.8 M/UL (ref 4.23–5.6)
SODIUM SERPL-SCNC: 136 MMOL/L (ref 136–145)
WBC # BLD AUTO: 13.3 K/UL (ref 4.3–11.1)

## 2020-07-27 PROCEDURE — 74011250636 HC RX REV CODE- 250/636: Performed by: EMERGENCY MEDICINE

## 2020-07-27 PROCEDURE — 96374 THER/PROPH/DIAG INJ IV PUSH: CPT

## 2020-07-27 PROCEDURE — 96375 TX/PRO/DX INJ NEW DRUG ADDON: CPT

## 2020-07-27 PROCEDURE — 80053 COMPREHEN METABOLIC PANEL: CPT

## 2020-07-27 PROCEDURE — 87040 BLOOD CULTURE FOR BACTERIA: CPT

## 2020-07-27 PROCEDURE — 74011000258 HC RX REV CODE- 258: Performed by: EMERGENCY MEDICINE

## 2020-07-27 PROCEDURE — 85025 COMPLETE CBC W/AUTO DIFF WBC: CPT

## 2020-07-27 PROCEDURE — 99283 EMERGENCY DEPT VISIT LOW MDM: CPT

## 2020-07-27 RX ORDER — VANCOMYCIN 2 GRAM/500 ML IN 0.9 % SODIUM CHLORIDE INTRAVENOUS
2000
Status: COMPLETED | OUTPATIENT
Start: 2020-07-27 | End: 2020-07-28

## 2020-07-27 RX ADMIN — VANCOMYCIN HYDROCHLORIDE 2000 MG: 10 INJECTION, POWDER, LYOPHILIZED, FOR SOLUTION INTRAVENOUS at 23:47

## 2020-07-27 RX ADMIN — CEFEPIME HYDROCHLORIDE 1 G: 1 INJECTION, POWDER, FOR SOLUTION INTRAMUSCULAR; INTRAVENOUS at 23:22

## 2020-07-28 ENCOUNTER — APPOINTMENT (OUTPATIENT)
Dept: ULTRASOUND IMAGING | Age: 85
DRG: 243 | End: 2020-07-28
Attending: NURSE PRACTITIONER
Payer: MEDICARE

## 2020-07-28 ENCOUNTER — APPOINTMENT (OUTPATIENT)
Dept: GENERAL RADIOLOGY | Age: 85
DRG: 243 | End: 2020-07-28
Attending: EMERGENCY MEDICINE
Payer: MEDICARE

## 2020-07-28 PROBLEM — N39.0 UTI (URINARY TRACT INFECTION): Status: RESOLVED | Noted: 2020-03-15 | Resolved: 2020-07-28

## 2020-07-28 PROBLEM — L03.90 CELLULITIS: Status: ACTIVE | Noted: 2020-07-28

## 2020-07-28 PROBLEM — D64.9 NORMOCYTIC ANEMIA: Status: ACTIVE | Noted: 2020-07-28

## 2020-07-28 PROBLEM — E87.6 HYPOKALEMIA: Status: RESOLVED | Noted: 2020-03-17 | Resolved: 2020-07-28

## 2020-07-28 PROBLEM — D72.829 LEUKOCYTOSIS: Status: ACTIVE | Noted: 2020-07-28

## 2020-07-28 LAB
ANION GAP SERPL CALC-SCNC: 5 MMOL/L (ref 7–16)
BASOPHILS # BLD: 0.1 K/UL (ref 0–0.2)
BASOPHILS NFR BLD: 1 % (ref 0–2)
BUN SERPL-MCNC: 32 MG/DL (ref 8–23)
CALCIUM SERPL-MCNC: 8.8 MG/DL (ref 8.3–10.4)
CHLORIDE SERPL-SCNC: 104 MMOL/L (ref 98–107)
CO2 SERPL-SCNC: 31 MMOL/L (ref 21–32)
CREAT SERPL-MCNC: 1.59 MG/DL (ref 0.8–1.5)
DIFFERENTIAL METHOD BLD: ABNORMAL
EOSINOPHIL # BLD: 0.4 K/UL (ref 0–0.8)
EOSINOPHIL NFR BLD: 3 % (ref 0.5–7.8)
ERYTHROCYTE [DISTWIDTH] IN BLOOD BY AUTOMATED COUNT: 15 % (ref 11.9–14.6)
GLUCOSE SERPL-MCNC: 132 MG/DL (ref 65–100)
HCT VFR BLD AUTO: 37 % (ref 41.1–50.3)
HGB BLD-MCNC: 11.4 G/DL (ref 13.6–17.2)
IMM GRANULOCYTES # BLD AUTO: 0.1 K/UL (ref 0–0.5)
IMM GRANULOCYTES NFR BLD AUTO: 1 % (ref 0–5)
LYMPHOCYTES # BLD: 2.6 K/UL (ref 0.5–4.6)
LYMPHOCYTES NFR BLD: 20 % (ref 13–44)
MCH RBC QN AUTO: 26 PG (ref 26.1–32.9)
MCHC RBC AUTO-ENTMCNC: 30.8 G/DL (ref 31.4–35)
MCV RBC AUTO: 84.5 FL (ref 79.6–97.8)
MONOCYTES # BLD: 1 K/UL (ref 0.1–1.3)
MONOCYTES NFR BLD: 8 % (ref 4–12)
NEUTS SEG # BLD: 9.1 K/UL (ref 1.7–8.2)
NEUTS SEG NFR BLD: 69 % (ref 43–78)
NRBC # BLD: 0 K/UL (ref 0–0.2)
PLATELET # BLD AUTO: 293 K/UL (ref 150–450)
PMV BLD AUTO: 10.8 FL (ref 9.4–12.3)
POTASSIUM SERPL-SCNC: 3.9 MMOL/L (ref 3.5–5.1)
RBC # BLD AUTO: 4.38 M/UL (ref 4.23–5.6)
SODIUM SERPL-SCNC: 140 MMOL/L (ref 136–145)
WBC # BLD AUTO: 13.3 K/UL (ref 4.3–11.1)

## 2020-07-28 PROCEDURE — 85025 COMPLETE CBC W/AUTO DIFF WBC: CPT

## 2020-07-28 PROCEDURE — 74011000250 HC RX REV CODE- 250: Performed by: INTERNAL MEDICINE

## 2020-07-28 PROCEDURE — 74011000258 HC RX REV CODE- 258: Performed by: INTERNAL MEDICINE

## 2020-07-28 PROCEDURE — 36415 COLL VENOUS BLD VENIPUNCTURE: CPT

## 2020-07-28 PROCEDURE — C8929 TTE W OR WO FOL WCON,DOPPLER: HCPCS

## 2020-07-28 PROCEDURE — 93306 TTE W/DOPPLER COMPLETE: CPT

## 2020-07-28 PROCEDURE — 74011250636 HC RX REV CODE- 250/636: Performed by: INTERNAL MEDICINE

## 2020-07-28 PROCEDURE — 74011250636 HC RX REV CODE- 250/636: Performed by: FAMILY MEDICINE

## 2020-07-28 PROCEDURE — 76604 US EXAM CHEST: CPT

## 2020-07-28 PROCEDURE — 77030040393 HC DRSG OPTIFOAM GENT MDII -B

## 2020-07-28 PROCEDURE — 80048 BASIC METABOLIC PNL TOTAL CA: CPT

## 2020-07-28 PROCEDURE — 74011250637 HC RX REV CODE- 250/637: Performed by: INTERNAL MEDICINE

## 2020-07-28 PROCEDURE — 74011250637 HC RX REV CODE- 250/637: Performed by: FAMILY MEDICINE

## 2020-07-28 PROCEDURE — 99222 1ST HOSP IP/OBS MODERATE 55: CPT | Performed by: INTERNAL MEDICINE

## 2020-07-28 PROCEDURE — 65660000000 HC RM CCU STEPDOWN

## 2020-07-28 PROCEDURE — 71046 X-RAY EXAM CHEST 2 VIEWS: CPT

## 2020-07-28 RX ORDER — ADHESIVE BANDAGE
30 BANDAGE TOPICAL DAILY PRN
Status: DISCONTINUED | OUTPATIENT
Start: 2020-07-28 | End: 2020-07-31 | Stop reason: HOSPADM

## 2020-07-28 RX ORDER — LANOLIN ALCOHOL/MO/W.PET/CERES
400 CREAM (GRAM) TOPICAL DAILY
Status: DISCONTINUED | OUTPATIENT
Start: 2020-07-28 | End: 2020-07-31 | Stop reason: HOSPADM

## 2020-07-28 RX ORDER — HYDROCHLOROTHIAZIDE 12.5 MG/1
12.5 CAPSULE ORAL DAILY
Status: DISCONTINUED | OUTPATIENT
Start: 2020-07-28 | End: 2020-07-31 | Stop reason: HOSPADM

## 2020-07-28 RX ORDER — VANCOMYCIN HYDROCHLORIDE
1250 EVERY 24 HOURS
Status: DISCONTINUED | OUTPATIENT
Start: 2020-07-28 | End: 2020-07-30

## 2020-07-28 RX ORDER — ONDANSETRON 2 MG/ML
4 INJECTION INTRAMUSCULAR; INTRAVENOUS
Status: DISCONTINUED | OUTPATIENT
Start: 2020-07-28 | End: 2020-07-31 | Stop reason: HOSPADM

## 2020-07-28 RX ORDER — PANTOPRAZOLE SODIUM 40 MG/1
40 TABLET, DELAYED RELEASE ORAL
Status: DISCONTINUED | OUTPATIENT
Start: 2020-07-28 | End: 2020-07-31 | Stop reason: HOSPADM

## 2020-07-28 RX ORDER — SODIUM CHLORIDE 9 MG/ML
75 INJECTION, SOLUTION INTRAVENOUS CONTINUOUS
Status: DISCONTINUED | OUTPATIENT
Start: 2020-07-28 | End: 2020-07-31 | Stop reason: HOSPADM

## 2020-07-28 RX ORDER — ATORVASTATIN CALCIUM 80 MG/1
80 TABLET, FILM COATED ORAL
Status: DISCONTINUED | OUTPATIENT
Start: 2020-07-28 | End: 2020-07-31 | Stop reason: HOSPADM

## 2020-07-28 RX ORDER — LOSARTAN POTASSIUM 50 MG/1
100 TABLET ORAL DAILY
Status: DISCONTINUED | OUTPATIENT
Start: 2020-07-28 | End: 2020-07-31 | Stop reason: HOSPADM

## 2020-07-28 RX ORDER — ENOXAPARIN SODIUM 100 MG/ML
40 INJECTION SUBCUTANEOUS EVERY 24 HOURS
Status: DISCONTINUED | OUTPATIENT
Start: 2020-07-28 | End: 2020-07-29

## 2020-07-28 RX ORDER — AMLODIPINE BESYLATE 5 MG/1
5 TABLET ORAL DAILY
Status: DISCONTINUED | OUTPATIENT
Start: 2020-07-28 | End: 2020-07-28

## 2020-07-28 RX ORDER — ESCITALOPRAM OXALATE 10 MG/1
10 TABLET ORAL DAILY
Status: DISCONTINUED | OUTPATIENT
Start: 2020-07-28 | End: 2020-07-31 | Stop reason: HOSPADM

## 2020-07-28 RX ORDER — GUAIFENESIN 100 MG/5ML
81 LIQUID (ML) ORAL DAILY
COMMUNITY

## 2020-07-28 RX ORDER — SODIUM CHLORIDE 0.9 % (FLUSH) 0.9 %
5-40 SYRINGE (ML) INJECTION AS NEEDED
Status: DISCONTINUED | OUTPATIENT
Start: 2020-07-28 | End: 2020-07-31 | Stop reason: HOSPADM

## 2020-07-28 RX ORDER — SODIUM CHLORIDE 0.9 % (FLUSH) 0.9 %
5-40 SYRINGE (ML) INJECTION EVERY 8 HOURS
Status: DISCONTINUED | OUTPATIENT
Start: 2020-07-28 | End: 2020-07-31 | Stop reason: HOSPADM

## 2020-07-28 RX ORDER — ACETAMINOPHEN 325 MG/1
650 TABLET ORAL
Status: DISCONTINUED | OUTPATIENT
Start: 2020-07-28 | End: 2020-07-31 | Stop reason: HOSPADM

## 2020-07-28 RX ORDER — AMLODIPINE BESYLATE 5 MG/1
2.5 TABLET ORAL DAILY
Status: DISCONTINUED | OUTPATIENT
Start: 2020-07-28 | End: 2020-07-31 | Stop reason: HOSPADM

## 2020-07-28 RX ORDER — COLCHICINE 0.6 MG/1
0.6 TABLET ORAL DAILY PRN
Status: DISCONTINUED | OUTPATIENT
Start: 2020-07-28 | End: 2020-07-31 | Stop reason: HOSPADM

## 2020-07-28 RX ADMIN — PANTOPRAZOLE SODIUM 40 MG: 40 TABLET, DELAYED RELEASE ORAL at 08:53

## 2020-07-28 RX ADMIN — SODIUM CHLORIDE 75 ML/HR: 9 INJECTION, SOLUTION INTRAVENOUS at 17:49

## 2020-07-28 RX ADMIN — LOSARTAN POTASSIUM 100 MG: 50 TABLET, FILM COATED ORAL at 08:49

## 2020-07-28 RX ADMIN — ATORVASTATIN CALCIUM 80 MG: 80 TABLET, FILM COATED ORAL at 21:01

## 2020-07-28 RX ADMIN — Medication 10 ML: at 07:18

## 2020-07-28 RX ADMIN — ENOXAPARIN SODIUM 40 MG: 40 INJECTION SUBCUTANEOUS at 08:50

## 2020-07-28 RX ADMIN — CEFEPIME HYDROCHLORIDE 1 G: 1 INJECTION, POWDER, FOR SOLUTION INTRAMUSCULAR; INTRAVENOUS at 18:31

## 2020-07-28 RX ADMIN — VANCOMYCIN HYDROCHLORIDE 1250 MG: 10 INJECTION, POWDER, LYOPHILIZED, FOR SOLUTION INTRAVENOUS at 22:19

## 2020-07-28 RX ADMIN — Medication 10 ML: at 21:46

## 2020-07-28 RX ADMIN — SODIUM CHLORIDE 75 ML/HR: 9 INJECTION, SOLUTION INTRAVENOUS at 04:18

## 2020-07-28 RX ADMIN — LEVOTHYROXINE SODIUM 137 MCG: 0.11 TABLET ORAL at 06:22

## 2020-07-28 RX ADMIN — ESCITALOPRAM OXALATE 10 MG: 10 TABLET ORAL at 08:49

## 2020-07-28 RX ADMIN — HYDROCHLOROTHIAZIDE 12.5 MG: 12.5 CAPSULE ORAL at 08:50

## 2020-07-28 RX ADMIN — Medication 5 ML: at 14:41

## 2020-07-28 RX ADMIN — AMLODIPINE BESYLATE 2.5 MG: 5 TABLET ORAL at 08:50

## 2020-07-28 RX ADMIN — MAGNESIUM GLUCONATE 500 MG ORAL TABLET 400 MG: 500 TABLET ORAL at 08:49

## 2020-07-28 NOTE — ROUTINE PROCESS
Verbal bedside report received from Julia [facility]. Assumed care of patient.

## 2020-07-28 NOTE — PROGRESS NOTES
Pharmacokinetic Consult to Pharmacist    Amada Holden is a 80 y.o. 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 Height: 6' 1\" (185.4 cm)  Weight: 102.2 kg (225 lb 5 oz)  Lab Results   Component Value Date/Time    BUN 32 (H) 07/27/2020 10:26 PM    Creatinine 1.70 (H) 07/27/2020 10:26 PM    WBC 13.3 (H) 07/27/2020 10:26 PM    Procalcitonin 0.09 03/15/2020 04:40 PM    Lactic acid 2.2 (HH) 03/15/2020 04:40 PM    Lactic acid 2.6 (H) 11/19/2013 05:46 PM    Lactic Acid (POC) 1.3 03/26/2018 04:06 PM      Estimated Creatinine Clearance: 39.9 mL/min (A) (based on SCr of 1.7 mg/dL (H)). CULTURES:  7/27 BCx: pending      Day 1 of vancomycin. Goal trough is 10 -20. Vancomycin dose initiated at 2g load x1, then 1250mg q 24h. Will continue to follow patient and order levels when clinically indicated.     Thank you,  Balbina German, PharmD  Clinical Pharmacist  [phone]

## 2020-07-28 NOTE — H&P
HOSPITALIST INITIAL HISTORY AND PHYSICAL    NAME:  Donya Phillips   Age:  80 y.o.  :   1935   MRN:   [id number]  PCP: Mansoor Omer MD  Consulting MD:  Treatment Team: Attending Provider: Thais Ac MD; Primary Nurse: Sonny Smith RN    CHIEF COMPLAINT: redness of pacemaker site    HISTORY OF PRESENT ILLNESS:   Donya Phillips is an 80 y.o. male with a past medical history of atrial flutter, complete heart block s/p BiV pacemaker place in 2019 who presents to the ER with report worsening redness of his pacemaker site for the past 3 days. The patient is somewhat confused and cannot provide additional history but does admit to tenderness over the area. Denies subjective fever or chills. REVIEW OF SYSTEMS: Comprehensive ROS performed. Denies fevers, chills, nausea, vomiting, otherwise negative. Past Medical History:   Diagnosis Date    Arthritis     Asthma     as a kid. none recent    Atrial flutter with rapid ventricular response ([address] 2014    1. Diagnosed 14: s/p ablation. 2. Echo (14): EF 45-50%. Moderate mitral regurgitation. 3. Ecardio (-14): No atrial arrhythmias.  Basal cell carcinoma of skin of other and unspecified parts of face 2014    Benign hypertensive heart disease without heart failure 2015    BPH (benign prostatic hyperplasia) 2013    CAD (coronary artery disease)     CABG, cardiac cath , stents times 2    Calculus of kidney 2014    Cancer [facility])     Carotid artery stenosis with cerebral infarction ([address] 2015    Followed by Dr. Valentina Buckner.     Chest pain, unspecified 2013    CVA (cerebral vascular accident) ([address] 2013    Diarrhea following gastrointestinal surgery 2013    Diverticulitis colectomy    Dizziness 2014    Generalized weakness     GERD (gastroesophageal reflux disease)     Gout 2013    Heart disease, unspecified 2014    Hematoma 3/15/2020    History of basal cell cancer nose    History of CVA (cerebrovascular accident) 6/18/2014    History of mini-strokes.  History of kidney stones 2006    Hyperlipidemia 4/16/2013    Hypertension     Hypertrophy of prostate with urinary obstruction and other lower urinary tract symptoms (LUTS) 2/20/2014    Hypothyroidism 4/16/2013    Incomplete bladder emptying 2/20/2014    Iron deficiency anemia 4/16/2013    NSTEMI (non-ST elevated myocardial infarction) [facility] 12/12/2013    Osteoarthritis 4/16/2013    Platelet inhibition due to Plavix     Pure hypercholesterolemia 2/20/2014    Stroke [facility])     x3 with the last one being 2/12    Unspecified adverse effect of anesthesia     state he woke up during heart surgery    Unspecified asthma 2/20/2014    Unspecified disorder of thyroid 2/20/2014    Urinary frequency 2/20/2014    Urinary tract infection, site not specified 11/20/2013        Past Surgical History:   Procedure Laterality Date    CABG, ARTERY-VEIN, FOUR  2005    HX ADENOIDECTOMY      HX APPENDECTOMY  age 15   [facility] HX CAROTID ENDARTERECTOMY Right 2011    HX CATARACT REMOVAL Bilateral 2006    HX CHOLECYSTECTOMY      HX CORONARY STENT PLACEMENT      HX OTHER SURGICAL  3/12/14    Plasma Button procedure of prostate    HX PACEMAKER PLACEMENT  02/06/2019    Dr. Myron Lam  12/12/2013    bmsx2 svg to !st obtuse marginal    REMOVAL OF KIDNEY STONE  05/2016    TOTAL KNEE ARTHROPLASTY  2007    right    TOTAL KNEE ARTHROPLASTY  2012    left       Prior to Admission Medications   Prescriptions Last Dose Informant Patient Reported? Taking? OTHER   No No   Sig: Wedges for propping in bed. OTHER   No No   Sig: Trapeze bar for bed mobility. OTHER   No No   Sig: Gel mattress overlay   amLODIPine (NORVASC) 5 mg tablet   No No   Sig: Take 1 Tab by mouth daily.    atorvastatin (LIPITOR) 80 mg tablet   No No   Sig: TAKE 1 TABLET BY MOUTH EVERY DAY   colchicine (MITIGARE) 0.6 mg capsule   Yes No   Sig: Take 0.6 mg by mouth as needed. cyanocobalamin (VITAMIN B-12) 1,000 mcg/mL injection   No No   Si ml injected intramuscularly once monthly   cyanocobalamin (VITAMIN B12) 1,000 mcg/mL injection   No No   Si ML INJECTED INTRAMUSCULARLY ONCE MONTHLY   ergocalciferol (VITAMIN D2) 50,000 unit capsule   No No   Sig: Take 1 Cap by mouth every seven (7) days. escitalopram oxalate (LEXAPRO) 10 mg tablet   No No   Sig: TAKE 1 TABLET BY MOUTH EVERY DAY IN THE MORNING   hydroCHLOROthiazide (HYDRODIURIL) 25 mg tablet   No No   Sig: TAKE 1/2 TABLET BY MOUTH DAILY   levothyroxine (SYNTHROID) 137 mcg tablet   No No   Sig: TAKE 137 MCG BY MOUTH DAILY (BEFORE BREAKFAST) FOR 30 DAYS. losartan (COZAAR) 100 mg tablet   No No   Sig: TAKE 1 TABLET BY MOUTH EVERY DAY   magnesium oxide (MAG-OX) 400 mg tablet   No No   Sig: Take 1 Tab by mouth daily. nitroglycerin (NITROSTAT) 0.4 mg SL tablet   No No   Si Tab by SubLINGual route every five (5) minutes as needed for Chest Pain. nystatin (MYCOSTATIN) powder   No No   Sig: Apply  to affected area three (3) times daily as needed for Other (rash). pantoprazole (PROTONIX) 40 mg tablet   No No   Sig: TAKE 1 TABLET BY MOUTH EVERY DAY   varicella-zoster recombinant, PF, (SHINGRIX, PF,) 50 mcg/0.5 mL susr injection   No No   Si.5mL by IntraMUSCular route once now and then repeat in 2-6 months      Facility-Administered Medications: None       Allergies   Allergen Reactions    Other Medication Other (comments)     Wife reports an unknown reaction to an unknown pain medicine. Cannot recall name or details. FAMILY HISTORY: Reviewed.  Negative except   Family History   Problem Relation Age of Onset    Heart Disease Father     Heart Attack Father     Stroke Sister     Cancer Sister         brain    Heart Disease Mother     Bleeding Prob Mother 80        breast cancer       Social History Tobacco Use    Smoking status: Former Smoker     Types: Cigars     Last attempt to quit: 2002     Years since quittin.5    Smokeless tobacco: Never Used    Tobacco comment: quit - cigars   Substance Use Topics    Alcohol use: No     Alcohol/week: 0.0 standard drinks     Comment: rare         Objective:     Visit Vitals  /66 (BP 1 Location: Left arm, BP Patient Position: At rest)   Pulse 69   Temp 99 °F (37.2 °C)   Resp 16   Ht 6' 1\" (1.854 m)   Wt 99.8 kg (220 lb)   SpO2 98%   BMI 29.03 kg/m²      Temp (24hrs), Av °F (37.2 °C), Min:99 °F (37.2 °C), Max:99 °F (37.2 °C)    Oxygen Therapy  O2 Sat (%): 98 % (20)  O2 Device: Room air (20)  Physical Exam:  General:    The patient is a pleasant elderly male in no acute distress. Head:   Normocephalic/atraumatic. Eyes:  No palpebral pallor or scleral icterus. ENT:  External auricular and nasal exam within normal limits. Mucous membranes are moist.  Neck:  Supple, non-tender, no JVD. Lungs:   Clear to auscultation bilaterally without wheezes or crackles. No respiratory distress or accessory muscle use. Heart:   Regular rate and rhythm, without murmurs, rubs, or gallops. Abdomen:   Soft, non-tender, non-distended with normoactive bowel sounds. Genitourinary: No tenderness over the bladder or bilateral CVAs. Extremities: Without clubbing, cyanosis, or edema. Skin:     Erythema and tenderness to palpation over pacemaker site of approx 6-7 cm diameter  Pulses: Radial and dorsalis pedis pulses present 2+ bilaterally. Capillary refill <2s. Neurologic: CN II-XII grossly intact and symmetrical.     Moving all four extremities well with no focal deficits. Psychiatric: Pleasant demeanor, appropriate affect.  Alert and oriented x 2 (does not know location)    Data Review:   Recent Results (from the past 24 hour(s))   CBC WITH AUTOMATED DIFF    Collection Time: 20 10:26 PM   Result Value Ref Range WBC 13.3 (H) 4.3 - 11.1 K/uL    RBC 4.80 4.23 - 5.6 M/uL    HGB 12.5 (L) 13.6 - 17.2 g/dL    HCT 40.0 (L) 41.1 - 50.3 %    MCV 83.3 79.6 - 97.8 FL    MCH 26.0 (L) 26.1 - 32.9 PG    MCHC 31.3 (L) 31.4 - 35.0 g/dL    RDW 15.1 (H) 11.9 - 14.6 %    PLATELET 236 639 - 422 K/uL    MPV 11.3 9.4 - 12.3 FL    ABSOLUTE NRBC 0.00 0.0 - 0.2 K/uL    DF AUTOMATED      NEUTROPHILS 73 43 - 78 %    LYMPHOCYTES 17 13 - 44 %    MONOCYTES 7 4.0 - 12.0 %    EOSINOPHILS 3 0.5 - 7.8 %    BASOPHILS 1 0.0 - 2.0 %    IMMATURE GRANULOCYTES 1 0.0 - 5.0 %    ABS. NEUTROPHILS 9.7 (H) 1.7 - 8.2 K/UL    ABS. LYMPHOCYTES 2.2 0.5 - 4.6 K/UL    ABS. MONOCYTES 0.9 0.1 - 1.3 K/UL    ABS. EOSINOPHILS 0.4 0.0 - 0.8 K/UL    ABS. BASOPHILS 0.1 0.0 - 0.2 K/UL    ABS. IMM. GRANS. 0.1 0.0 - 0.5 K/UL   METABOLIC PANEL, COMPREHENSIVE    Collection Time: 07/27/20 10:26 PM   Result Value Ref Range    Sodium 136 136 - 145 mmol/L    Potassium 4.3 3.5 - 5.1 mmol/L    Chloride 102 98 - 107 mmol/L    CO2 30 21 - 32 mmol/L    Anion gap 4 (L) 7 - 16 mmol/L    Glucose 211 (H) 65 - 100 mg/dL    BUN 32 (H) 8 - 23 MG/DL    Creatinine 1.70 (H) 0.8 - 1.5 MG/DL    GFR est AA 50 (L) >60 ml/min/1.73m2    GFR est non-AA 41 (L) >60 ml/min/1.73m2    Calcium 8.7 8.3 - 10.4 MG/DL    Bilirubin, total 0.4 0.2 - 1.1 MG/DL    ALT (SGPT) 34 12 - 65 U/L    AST (SGOT) 35 15 - 37 U/L    Alk. phosphatase 88 50 - 136 U/L    Protein, total 7.9 6.3 - 8.2 g/dL    Albumin 2.8 (L) 3.2 - 4.6 g/dL    Globulin 5.1 (H) 2.3 - 3.5 g/dL    A-G Ratio 0.5 (L) 1.2 - 3.5         Imaging Jermaine Galarza /Studies:  Xr Chest Pa Lat    Result Date: 7/28/2020  IMPRESSION: No acute process. Assessment and Plan:     Principal Problem:    Cellulitis (7/28/2020)    IV Vanc. Consult ID per cardiology request.     Active Problems:    Leukocytosis (7/28/2020)    Follow CBC      Chronic kidney disease (CKD), stage III (moderate) (HCC) (9/19/2016)    Stable. Continue home meds.        Normocytic anemia (7/28/2020) Stable, follow CBC      Hypertension (4/16/2013)    Stable. Continue home meds. CAD (coronary artery disease) (4/16/2013)    Stable. Continue home meds. Paroxysmal atrial fibrillation (HCC) (6/18/2014)    Stable. Continue home meds. History of CVA (cerebrovascular accident) (6/18/2014)    Stable. Continue home meds. Hypothyroidism (4/16/2013)    Stable. Continue home meds. Hyperlipidemia (4/16/2013)    Stable. Continue home meds. GERD (gastroesophageal reflux disease) (4/16/2013)    Stable. Continue home meds. DVT Prophylaxis: Lovenox      Code Status: FULL CODE      Disposition: Admit to med/surg for evaluation and treatment as per above.       Anticipated discharge: 2-3 days     Signed By: Aidan Alexander MD     July 28, 2020

## 2020-07-28 NOTE — CONSULTS
[facility] Cardiology Consult                Date of  Admission: 7/27/2020 10:11 PM     Primary Care Physician:  Dr. Lisa Lawrence  Primary Cardiologist:  Dr. Flaca Michel  Primary EP:  Dr. James Frazier  Referring Physician:  Dr. Fercho Ruiz Physician:  Dr. James Frazier    CC/Reason for consult:  ? Infected PPM site      Alf Gonzalez is a 80 y.o. male with PMH of  CAD with remote CABG (LIMA-LAD, SVG-OM, SVG-DIAG, SVG-RCA), Biotronik BIV PPM,  Carotid artery disease, remote history of mini strokes, PAF (Eliquis stopped 3/2020 due to rectus sheath hematoma), HTN, and dyslipidemia, who presented to the ED with c/o worsening redness over PPM site x 3 days. Patient confused and has difficulty providing history. Labs show WBC 13.3, H&H 12.5/40, plt 343,  , K 4.3, BUN 32 and creatinine 1.70. Patient admitted for possible cellulitis. PPM was placed in February of 2/2019. The area of erythema has a hard center the size of a quarter and is approx 3 cm below old PPM incision. Past Medical History:   Diagnosis Date    Arthritis     Asthma     as a kid. none recent    Atrial flutter with rapid ventricular response ([address] 6/18/2014    1. Diagnosed 6/18/14: s/p ablation. 2. Echo (6/19/14): EF 45-50%. Moderate mitral regurgitation. 3. Ecardio (8/12-8/25/14): No atrial arrhythmias.  Basal cell carcinoma of skin of other and unspecified parts of face 2/20/2014    Benign hypertensive heart disease without heart failure 9/29/2015    BPH (benign prostatic hyperplasia) 4/16/2013    CAD (coronary artery disease)     CABG, cardiac cath , stents times 2    Calculus of kidney 2/20/2014    Cancer [facility])     Carotid artery stenosis with cerebral infarction ([address] 9/29/2015    Followed by Dr. Tiesha Sanchez.     Chest pain, unspecified 12/11/2013    CVA (cerebral vascular accident) ([address] 4/16/2013    Diarrhea following gastrointestinal surgery 4/16/2013    Diverticulitis colectomy    Dizziness 6/18/2014    Generalized weakness     GERD (gastroesophageal reflux disease)     Gout 4/16/2013    Heart disease, unspecified 2/20/2014    Hematoma 3/15/2020    History of basal cell cancer nose    History of CVA (cerebrovascular accident) 6/18/2014    History of mini-strokes.  History of kidney stones 2006    Hyperlipidemia 4/16/2013    Hypertension     Hypertrophy of prostate with urinary obstruction and other lower urinary tract symptoms (LUTS) 2/20/2014    Hypothyroidism 4/16/2013    Incomplete bladder emptying 2/20/2014    Iron deficiency anemia 4/16/2013    NSTEMI (non-ST elevated myocardial infarction) [facility] 12/12/2013    Osteoarthritis 4/16/2013    Platelet inhibition due to Plavix     Pure hypercholesterolemia 2/20/2014    Stroke [facility])     x3 with the last one being 2/12    Unspecified adverse effect of anesthesia     state he woke up during heart surgery    Unspecified asthma 2/20/2014    Unspecified disorder of thyroid 2/20/2014    Urinary frequency 2/20/2014    Urinary tract infection, site not specified 11/20/2013      Past Surgical History:   Procedure Laterality Date    CABG, ARTERY-VEIN, FOUR  2005    HX ADENOIDECTOMY      HX APPENDECTOMY  age 15   [facility] HX CAROTID ENDARTERECTOMY Right 2011    HX CATARACT REMOVAL Bilateral 2006    HX CHOLECYSTECTOMY      HX CORONARY STENT PLACEMENT      HX OTHER SURGICAL  3/12/14    Plasma Button procedure of prostate    HX PACEMAKER PLACEMENT  02/06/2019    Dr. Luna Primes  12/12/2013    bmsx2 svg to !st obtuse marginal    REMOVAL OF KIDNEY STONE  05/2016    TOTAL KNEE ARTHROPLASTY  2007    right    TOTAL KNEE ARTHROPLASTY  2012    left     Allergies   Allergen Reactions    Other Medication Other (comments)     Wife reports an unknown reaction to an unknown pain medicine. Cannot recall name or details.          Family History   Problem Relation Age of Onset    Heart Disease Father     Heart Attack Father     Stroke Sister     Cancer Sister         brain    Heart Disease Mother     Bleeding Prob Mother 80        breast cancer        Current Facility-Administered Medications   Medication Dose Route Frequency    atorvastatin (LIPITOR) tablet 80 mg  80 mg Oral QHS    colchicine tablet 0.6 mg  0.6 mg Oral DAILY PRN    escitalopram oxalate (LEXAPRO) tablet 10 mg  10 mg Oral DAILY    hydroCHLOROthiazide (MICROZIDE) capsule 12.5 mg  12.5 mg Oral DAILY    levothyroxine (SYNTHROID) tablet 137 mcg  137 mcg Oral 6am    losartan (COZAAR) tablet 100 mg  100 mg Oral DAILY    magnesium oxide (MAG-OX) tablet 400 mg  400 mg Oral DAILY    pantoprazole (PROTONIX) tablet 40 mg  40 mg Oral ACB    sodium chloride (NS) flush 5-40 mL  5-40 mL IntraVENous Q8H    sodium chloride (NS) flush 5-40 mL  5-40 mL IntraVENous PRN    acetaminophen (TYLENOL) tablet 650 mg  650 mg Oral Q4H PRN    ondansetron (ZOFRAN) injection 4 mg  4 mg IntraVENous Q4H PRN    magnesium hydroxide (MILK OF MAGNESIA) 400 mg/5 mL oral suspension 30 mL  30 mL Oral DAILY PRN    0.9% sodium chloride infusion  75 mL/hr IntraVENous CONTINUOUS    enoxaparin (LOVENOX) injection 40 mg  40 mg SubCUTAneous Q24H    vancomycin (VANCOCIN) 1250 mg in  ml infusion  1,250 mg IntraVENous Q24H    amLODIPine (NORVASC) tablet 2.5 mg  2.5 mg Oral DAILY       Review of Systems   Constitutional: Negative. Redness and pain near PPM site    HENT: Negative. Eyes: Negative. Respiratory: Negative. Cardiovascular: Negative. Gastrointestinal: Negative. Genitourinary: Negative. Musculoskeletal: Negative. Skin: Negative. Neurological: Negative. Endo/Heme/Allergies: Negative. Psychiatric/Behavioral: Negative.          Physical Exam  Vitals:    07/27/20 2218 07/27/20 2233 07/28/20 0255 07/28/20 0431   BP: 154/66  138/69 137/74   Pulse: 69  69 68   Resp: 16  18 18   Temp: 99 °F (37.2 °C)  99 °F (37.2 °C) 98.4 °F (36.9 °C)   SpO2: 98% 98% 97% 97%   Weight: 99.8 kg (220 lb)  102.2 kg (225 lb 5 oz)    Height: 6' 1\" (1.854 m)  6' 1\" (1.854 m)        Physical Exam:  Physical Exam  Eyes:      Pupils: Pupils are equal, round, and reactive to light. Cardiovascular:      Rate and Rhythm: Normal rate and regular rhythm. Pulmonary:      Effort: Pulmonary effort is normal.      Breath sounds: Normal breath sounds. Abdominal:      General: Bowel sounds are normal.   Musculoskeletal: Normal range of motion. Skin:     Findings: Erythema present. Neurological:      General: No focal deficit present. Mental Status: He is alert and oriented to person, place, and time. Psychiatric:         Mood and Affect: Mood normal.         Behavior: Behavior normal.           Labs:   Recent Labs     07/28/20  0348 07/27/20  2226    136   K 3.9 4.3   BUN 32* 32*   CREA 1.59* 1.70*   * 211*   WBC 13.3* 13.3*   HGB 11.4* 12.5*   HCT 37.0* 40.0*    343        Assessment/Plan:     Assessment:      Principal Problem:    Cellulitis -- on rocephin and vanc. ID consult pending    Active Problems:    Hypertension -- stable, continue current medications. CAD (coronary artery disease) -- denies CP or shortness of breath. Continue statin          Hypothyroidism (4/16/2013)      Hyperlipidemia -- on statin      GERD (gastroesophageal reflux disease) (4/16/2013)      Paroxysmal atrial fibrillation -- no AC due to hematoma of rectus sheath in March. HR controlled          History of CVA (cerebrovascular accident)           Chronic kidney disease (CKD), stage III (moderate) -- monitor       Leukocytosis -- see above     Thank you very much for this referral. We appreciate the opportunity to participate in this patient's care. We will follow along with above stated plan. Emiliano Turcios NP  Consulting MD: Nicole Olson    Attending Addendum    Patient independently seen and examined by me.   Agree with above note by physician extender with the following additions and exceptions: 80 y.o. male with PMH of  CAD with remote CABG (LIMA-LAD, SVG-OM, SVG-DIAG, SVG-RCA), Biotronik BIV PPM,  Carotid artery disease, remote history of mini strokes, PAF (Eliquis stopped 3/2020 due to rectus sheath hematoma), HTN, and dyslipidemia,     Key findings are:  No CP or HOUSER  CV- RR, paced, without murmur  Skin- erythematous indurated area over left chest wall over PPM pocket  Lungs- Clear bilaterally  Ext- no edema    Plan: labs, blood cultures x 2, ID consult, broad spectrum antibiotics       --concern for infection involving PPM pocket       --device interrogation today, check TTE       --considering Micra implantation followed by system extraction       --Pt does not walk, limited mobility        --further plan pending ID involvement and clinical course     Ruma NAVARRETE.  [address] [facility]

## 2020-07-28 NOTE — ED PROVIDER NOTES
80-year-old male with a history of atrial flutter, complete heart block with Biotronik biventricular pacer placed in 2019, CABG, stroke, hypertension, asthma presents with redness around his pacemaker site that has been gradually worsening for the past 3 days. He has had a low-grade fever around 99. He denies shortness of breath, cough, chest pain, congestion, or known exposure to coronavirus. No drainage. Skin Problem           Past Medical History:   Diagnosis Date    Arthritis     Asthma     as a kid. none recent    Atrial flutter with rapid ventricular response ([address] 6/18/2014    1. Diagnosed 6/18/14: s/p ablation. 2. Echo (6/19/14): EF 45-50%. Moderate mitral regurgitation. 3. Ecardio (8/12-8/25/14): No atrial arrhythmias.  Basal cell carcinoma of skin of other and unspecified parts of face 2/20/2014    Benign hypertensive heart disease without heart failure 9/29/2015    BPH (benign prostatic hyperplasia) 4/16/2013    CAD (coronary artery disease)     CABG, cardiac cath , stents times 2    Calculus of kidney 2/20/2014    Cancer [facility])     Carotid artery stenosis with cerebral infarction ([address] 9/29/2015    Followed by Dr. Inessa Quick.  Chest pain, unspecified 12/11/2013    CVA (cerebral vascular accident) ([address] 4/16/2013    Diarrhea following gastrointestinal surgery 4/16/2013    Diverticulitis colectomy    Dizziness 6/18/2014    Generalized weakness     GERD (gastroesophageal reflux disease)     Gout 4/16/2013    Heart disease, unspecified 2/20/2014    Hematoma 3/15/2020    History of basal cell cancer nose    History of CVA (cerebrovascular accident) 6/18/2014    History of mini-strokes.      History of kidney stones 2006    Hyperlipidemia 4/16/2013    Hypertension     Hypertrophy of prostate with urinary obstruction and other lower urinary tract symptoms (LUTS) 2/20/2014    Hypothyroidism 4/16/2013    Incomplete bladder emptying 2/20/2014    Iron deficiency anemia 4/16/2013    NSTEMI (non-ST elevated myocardial infarction) [address] 12/12/2013    Osteoarthritis 4/16/2013    Platelet inhibition due to Plavix     Pure hypercholesterolemia 2/20/2014    Stroke [facility])     x3 with the last one being 2/12    Unspecified adverse effect of anesthesia     state he woke up during heart surgery    Unspecified asthma 2/20/2014    Unspecified disorder of thyroid 2/20/2014    Urinary frequency 2/20/2014    Urinary tract infection, site not specified 11/20/2013       Past Surgical History:   Procedure Laterality Date    CABG, ARTERY-VEIN, FOUR  2005    HX ADENOIDECTOMY      HX APPENDECTOMY  age 12    HX CAROTID ENDARTERECTOMY Right 2011    HX CATARACT REMOVAL Bilateral 2006    HX CHOLECYSTECTOMY      HX CORONARY STENT PLACEMENT      HX OTHER SURGICAL  3/12/14    Plasma Button procedure of prostate    HX PACEMAKER PLACEMENT  02/06/2019    Dr. Guerrero Kc  12/12/2013    bmsx2 svg to !st obtuse marginal    REMOVAL OF KIDNEY STONE  05/2016    TOTAL KNEE ARTHROPLASTY  2007    right    TOTAL KNEE ARTHROPLASTY  2012    left         Family History:   Problem Relation Age of Onset    Heart Disease Father     Heart Attack Father     Stroke Sister     Cancer Sister         brain    Heart Disease Mother     Bleeding Prob Mother 80        breast cancer       Social History     Socioeconomic History    Marital status:      Spouse name: Not on file    Number of children: Not on file    Years of education: Not on file    Highest education level: Not on file   Occupational History    Not on file   Social Needs    Financial resource strain: Not on file    Food insecurity     Worry: Not on file     Inability: Not on file    Transportation needs     Medical: Not on file     Non-medical: Not on file   Tobacco Use    Smoking status: Former Smoker     Types: Cigars     Last attempt to quit: 2002     Years since quittin.5    Smokeless tobacco: Never Used    Tobacco comment: quit - cigars   Substance and Sexual Activity    Alcohol use: No     Alcohol/week: 0.0 standard drinks     Comment: rare    Drug use: No    Sexual activity: Not Currently   Lifestyle    Physical activity     Days per week: Not on file     Minutes per session: Not on file    Stress: Not on file   Relationships    Social connections     Talks on phone: Not on file     Gets together: Not on file     Attends Latter day service: Not on file     Active member of club or organization: Not on file     Attends meetings of clubs or organizations: Not on file     Relationship status: Not on file    Intimate partner violence     Fear of current or ex partner: Not on file     Emotionally abused: Not on file     Physically abused: Not on file     Forced sexual activity: Not on file   Other Topics Concern    Not on file   Social History Narrative    Not on file         ALLERGIES: Other medication    Review of Systems   Constitutional: Positive for fatigue and fever. Negative for chills. HENT: Negative for hearing loss. Eyes: Negative for visual disturbance. Respiratory: Negative for cough and shortness of breath. Cardiovascular: Negative for chest pain and palpitations. Gastrointestinal: Negative for abdominal pain, diarrhea, nausea and vomiting. Musculoskeletal: Negative for back pain. Skin: Positive for color change. Negative for rash and wound. Neurological: Negative for weakness and headaches. Psychiatric/Behavioral: Negative for confusion. Vitals:    20 2218 20 2233   BP: 154/66    Pulse: 69    Resp: 16    Temp: 99 °F (37.2 °C)    SpO2: 98% 98%   Weight: 99.8 kg (220 lb)    Height: 6' 1\" (1.854 m)             Physical Exam  Vitals signs and nursing note reviewed. Constitutional:       Appearance: He is well-developed. HENT:      Head: Normocephalic and atraumatic.    Eyes: Pupils: Pupils are equal, round, and reactive to light. Neck:      Musculoskeletal: Normal range of motion and neck supple. Cardiovascular:      Rate and Rhythm: Regular rhythm. Heart sounds: Normal heart sounds. Pulmonary:      Effort: Pulmonary effort is normal.      Breath sounds: Normal breath sounds. Chest:       Abdominal:      Palpations: Abdomen is soft. Tenderness: There is no abdominal tenderness. Musculoskeletal: Normal range of motion. Skin:     General: Skin is warm and dry. Findings: Erythema present. Neurological:      Mental Status: He is alert. Psychiatric:         Behavior: Behavior normal.              MDM  Number of Diagnoses or Management Options  Pacemaker infection, initial encounter [facility]):   Diagnosis management comments: Parts of this document were created using dragon voice recognition software. The chart has been reviewed but errors may still be present. I wore appropriate PPE throughout this patient's ED visit. Claudia Pena MD, 11:15 PM    dw Dr Daniela Umaña, cardiology for admission. vanc ordered.        Amount and/or Complexity of Data Reviewed  Clinical lab tests: reviewed and ordered  Tests in the radiology section of CPT®: ordered and reviewed  Tests in the medicine section of CPT®: ordered and reviewed           Procedures

## 2020-07-28 NOTE — ROUTINE PROCESS
Bedside and Verbal shift change report given to [facility], [facility], RN (oncoming nurse) by myself (offgoing nurse). Report included the following information SBAR, Kardex, ED Summary, [facility] and Recent Results.

## 2020-07-28 NOTE — CONSULTS
Infectious Disease Consult    Today's Date: 7/28/2020   Admit Date: 7/27/2020    Impression:   · PPM pocket infection  · Hx of CHB s/p PPM (2/2019)  · OA s/p courtney TKAs     Plan:   ·  Continue Vancomycin, cefepime  · Follow BCs  · Check TTE and US PM pocket. Cardiology to remove device after temporary pacer placed. Anti-infectives:   · Vanc/Cefepime 7/28-    Subjective:   Date of Consultation:  July 28, 2020  Referring Physician: Aleyda Arguelles    Patient is a 80 y.o. male with significant medical hx for CAD with CVA x3,AF/CHB s/p PPM (2/2019), OA s/p courtney TKAs, CKD3, who presented to ED for worsening erythema over PM pocket. Patient is a poor historian and does not elaborate on hx. Wife states erythema was sudden onset. He remains afebrile with significant leukocytosis. BCx2 pending. Started on Vancomycin and Cefepime. Denies nausea, vomiting, diarrhea, fever, chills, or sweats    Patient Active Problem List   Diagnosis Code    Hypertension I10    CAD (coronary artery disease) I25.10    Hypothyroidism E03.9    Hyperlipidemia E78.5    Osteoarthritis M19.90    Gout M10.9    GERD (gastroesophageal reflux disease) K21.9    Renal stones N20.0    Benign non-nodular prostatic hyperplasia with lower urinary tract symptoms N40.1    Paroxysmal atrial fibrillation (HCC) I48.0    History of CVA (cerebrovascular accident) Z86.73    Carotid stenosis, asymptomatic I65.29    Chronic kidney disease (CKD), stage III (moderate) (HCC) N18.3    Slow transit constipation K59.01    Bradycardia R00.1    Abnormality of gait as late effect of cerebrovascular accident (CVA) I69.398, R26.9    B12 deficiency E53.8    Vitamin D deficiency E55.9    Rectus sheath hematoma, initial encounter S30. 1XXA    Leukocytosis D72.829    Normocytic anemia D64.9    Cellulitis L03.90     Past Medical History:   Diagnosis Date    Arthritis     Asthma     as a kid.  none recent    Atrial flutter with rapid ventricular response ([facility] 6/18/2014 1. Diagnosed 6/18/14: s/p ablation. 2. Echo (6/19/14): EF 45-50%. Moderate mitral regurgitation. 3. Ecardio (8/12-8/25/14): No atrial arrhythmias.  Basal cell carcinoma of skin of other and unspecified parts of face 2/20/2014    Benign hypertensive heart disease without heart failure 9/29/2015    BPH (benign prostatic hyperplasia) 4/16/2013    CAD (coronary artery disease)     CABG, cardiac cath , stents times 2    Calculus of kidney 2/20/2014    Cancer [facility])     Carotid artery stenosis with cerebral infarction ([facility] 9/29/2015    Followed by Dr. Sharonda Mcdowell.  Chest pain, unspecified 12/11/2013    CVA (cerebral vascular accident) ([address] 4/16/2013    Diarrhea following gastrointestinal surgery 4/16/2013    Diverticulitis colectomy    Dizziness 6/18/2014    Generalized weakness     GERD (gastroesophageal reflux disease)     Gout 4/16/2013    Heart disease, unspecified 2/20/2014    Hematoma 3/15/2020    History of basal cell cancer nose    History of CVA (cerebrovascular accident) 6/18/2014    History of mini-strokes.      History of kidney stones 2006    Hyperlipidemia 4/16/2013    Hypertension     Hypertrophy of prostate with urinary obstruction and other lower urinary tract symptoms (LUTS) 2/20/2014    Hypothyroidism 4/16/2013    Incomplete bladder emptying 2/20/2014    Iron deficiency anemia 4/16/2013    NSTEMI (non-ST elevated myocardial infarction) ([facility] 12/12/2013    Osteoarthritis 4/16/2013    Platelet inhibition due to Plavix     Pure hypercholesterolemia 2/20/2014    Stroke [facility])     x3 with the last one being 2/12    Unspecified adverse effect of anesthesia     state he woke up during heart surgery    Unspecified asthma 2/20/2014    Unspecified disorder of thyroid 2/20/2014    Urinary frequency 2/20/2014    Urinary tract infection, site not specified 11/20/2013      Family History   Problem Relation Age of Onset    Heart Disease Father     Heart Attack Father    Aundra Black Stroke Sister     Cancer Sister         brain    Heart Disease Mother     Bleeding Prob Mother 80        breast cancer      Social History     Tobacco Use    Smoking status: Former Smoker     Types: Cigars     Last attempt to quit: 2002     Years since quittin.5    Smokeless tobacco: Never Used    Tobacco comment: quit - cigars   Substance Use Topics    Alcohol use: No     Alcohol/week: 0.0 standard drinks     Comment: rare     Past Surgical History:   Procedure Laterality Date    CABG, ARTERY-VEIN, FOUR      HX ADENOIDECTOMY      HX APPENDECTOMY  age 15   Paxton Yang CAROTID ENDARTERECTOMY Right 2011    HX CATARACT REMOVAL Bilateral 2006    HX CHOLECYSTECTOMY      HX CORONARY STENT PLACEMENT      HX OTHER SURGICAL  3/12/14    Plasma Button procedure of prostate    HX PACEMAKER PLACEMENT  2019    Dr. Angelita Ding  2013    bmsx2 svg to !st obtuse marginal    REMOVAL OF KIDNEY STONE  2016    TOTAL KNEE ARTHROPLASTY      right    TOTAL KNEE ARTHROPLASTY  2012    left      Prior to Admission medications    Medication Sig Start Date End Date Taking?  Authorizing Provider   cyanocobalamin (VITAMIN B12) 1,000 mcg/mL injection 1 ML INJECTED INTRAMUSCULARLY ONCE MONTHLY 20   Jessica Reynoso MD   levothyroxine (SYNTHROID) 137 mcg tablet TAKE 137 MCG BY MOUTH DAILY (BEFORE BREAKFAST) FOR 30 DAYS. 20   Sissy Trinh MD   atorvastatin (LIPITOR) 80 mg tablet TAKE 1 TABLET BY MOUTH EVERY DAY 3/30/20   Jesscia Ordaz MD   hydroCHLOROthiazide (HYDRODIURIL) 25 mg tablet TAKE 1/2 TABLET BY MOUTH DAILY 20   Sissy Trinh MD   pantoprazole (PROTONIX) 40 mg tablet TAKE 1 TABLET BY MOUTH EVERY DAY 19   Sissy Trinh MD   losartan (COZAAR) 100 mg tablet TAKE 1 TABLET BY MOUTH EVERY DAY 19   Sissy Trinh MD   varicella-zoster recombinant, PF, (SHINGRIX, PF,) 50 mcg/0.5 mL susr injection 0.5mL by IntraMUSCular route once now and then repeat in 2-6 months 10/24/19   Nils Jensen MD   ergocalciferol (VITAMIN D2) 50,000 unit capsule Take 1 Cap by mouth every seven (7) days. 10/24/19   Shauna Reynoso MD   escitalopram oxalate (LEXAPRO) 10 mg tablet TAKE 1 TABLET BY MOUTH EVERY DAY IN THE MORNING 10/17/19   Shauna Reynoso MD   nitroglycerin (NITROSTAT) 0.4 mg SL tablet 1 Tab by SubLINGual route every five (5) minutes as needed for Chest Pain. 10/9/19   Lilly Fowler MD   amLODIPine (NORVASC) 5 mg tablet Take 1 Tab by mouth daily. 10/9/19   Lilly Fowler MD   cyanocobalamin (VITAMIN B-12) 1,000 mcg/mL injection 1 ml injected intramuscularly once monthly 6/10/19   Nils Jensen MD   magnesium oxide (MAG-OX) 400 mg tablet Take 1 Tab by mouth daily. 6/10/19   Shauna Reynoso MD   OTHER Gel mattress overlay 19   Nils Jensen MD   nystatin (MYCOSTATIN) powder Apply  to affected area three (3) times daily as needed for Other (rash). 19   Shauna Reynoso MD   OTHER Trapeze bar for bed mobility. 18   Nils Jensen MD   OTHER Wedges for propping in bed. 10/2/18   Nils Jensen MD   colchicine (MITIGARE) 0.6 mg capsule Take 0.6 mg by mouth as needed. Provider, Historical       Allergies   Allergen Reactions    Other Medication Other (comments)     Wife reports an unknown reaction to an unknown pain medicine. Cannot recall name or details. Review of Systems:  A comprehensive review of systems was negative except for that written in the History of Present Illness.     Objective:     Visit Vitals  /71   Pulse 68   Temp 98.4 °F (36.9 °C)   Resp 18   Ht 6' 1\" (1.854 m)   Wt 102.2 kg (225 lb 5 oz)   SpO2 97%   BMI 29.73 kg/m²     Temp (24hrs), Av.8 °F (37.1 °C), Min:98.4 °F (36.9 °C), Max:99 °F (37.2 °C)       Lines:  Peripheral IV:       Physical Exam:    General:  Alert, cooperative, appears stated age   Eyes:  Sclera anicteric. Pupils equally round and reactive to light. Mouth/Throat: Mucous membranes normal, oral pharynx clear   Neck: Supple   Lungs:   Clear to auscultation bilaterally, good effort   CV:  Regular rate and rhythm,no murmur, click, rub or gallop   Abdomen:   Soft, non-tender.  bowel sounds normal. non-distended   Extremities: No cyanosis or edema   Skin: L chest PM pocket w 2x2cm indurated, nodule consistent with abscess    Lymph nodes: Cervical and supraclavicular normal   Musculoskeletal: No swelling or deformity   Lines/Devices:  Intact, no erythema, drainage or tenderness   Psych: Alert and oriented, normal mood affect given the setting       Data Review:     CBC:  Recent Labs     07/28/20 0348 07/27/20 2226   WBC 13.3* 13.3*   GRANS 69 73   MONOS 8 7   EOS 3 3   ANEU 9.1* 9.7*   ABL 2.6 2.2   HGB 11.4* 12.5*   HCT 37.0* 40.0*    343       BMP:  Recent Labs     07/28/20 0348 07/27/20 2226   CREA 1.59* 1.70*   BUN 32* 32*    136   K 3.9 4.3    102   CO2 31 30   AGAP 5* 4*   * 211*       LFTS:  Recent Labs     07/27/20 2226   TBILI 0.4   ALT 34   AP 88   TP 7.9   ALB 2.8*       Microbiology:     All Micro Results     Procedure Component Value Units Date/Time    CULTURE, BLOOD [[id number]] Collected:  07/27/20 2320    Order Status:  Completed Specimen:  Blood Updated:  07/28/20 0859     Special Requests: --        RIGHT  HAND       Culture result: NO GROWTH AFTER 8 HOURS       CULTURE, BLOOD [[id number]] Collected:  07/27/20 2321    Order Status:  Completed Specimen:  Blood Updated:  07/28/20 0859     Special Requests: --        RIGHT  FOREARM       Culture result: NO GROWTH AFTER 8 HOURS             Imaging:   n/a    Signed By: Awais Li NP     July 28, 2020

## 2020-07-28 NOTE — PROGRESS NOTES
Care Management Interventions  PCP Verified by CM: Yes  Last Visit to PCP: 06/24/20  Mode of Transport at Discharge: Other (see comment)(Gris Munoz  Spouse  [phone]  )  Transition of Care Consult (CM Consult): Discharge Planning, [address]: No  Reason Outside [place]: Patient already serviced by other home care/hospice agency  Discharge Durable Medical Equipment: No  Physical Therapy Consult: Yes  Occupational Therapy Consult: Yes  Current Support Network: Lives with Spouse, Own Home  Confirm Follow Up Transport: Family  The Plan for Transition of Care is Related to the Following Treatment Goals : Home health  The Patient and/or Patient Representative was Provided with a Choice of Provider and Agrees with the Discharge Plan?: Yes  Name of the Patient Representative Who was Provided with a Choice of Provider and Agrees with the Discharge Plan: Patient and spouse  Freedom of Choice List was Provided with Basic Dialogue that Supports the Patient's Individualized Plan of Care/Goals, Treatment Preferences and Shares the Quality Data Associated with the Providers?: Yes  Discharge Location  Discharge Placement: Home with home health    Pt admitted to 3rd floor [facility] for cellulitis. . CM met with pt and his spouse to discuss CM needs & DCP. Pt is A&Ox4. Pt is assisted at home with ADLs. [facility] RN, PT current. Pt's niece comes to care for patient on Tuesday nights . Pt lives with his spouse. Pt is completely bedridden for over a year. Pt has a hospital bed, James lift, cane, RW, WC, SC, BSC and a slide chair. Pt has no difficulty with obtaining medications or transport. DCP home with spouse and resume [facility]. No further needs noted. CM to continue to monitor.

## 2020-07-28 NOTE — ROUTINE PROCESS
TRANSFER - IN REPORT: 
 
Verbal report received from [facility], RN(name) on Henrik Kenny  being received from ER(unit) for routine progression of care Report consisted of patients Situation, Background, Assessment and  
Recommendations(SBAR). Information from the following report(s) SBAR, Kardex, ED Summary, [facility] and Recent Results was reviewed with the receiving nurse. Opportunity for questions and clarification was provided. Assessment completed upon patients arrival to unit and care assumed. Dual skin assessment done. Coccyx has red and open area, Allevyn applied. Scarring to right knee and abdomen. Heels red and boggy, heels elevated with pillows to prevent pressure.

## 2020-07-28 NOTE — PROGRESS NOTES
Rasheed Ibarra with [facility] called to give report on patient. Rasheed Ibarra stated patient has not ambulated for over 1 year and if patient sits in chair or stands for too long, patient will have TIA/ pass out. Patient has baseline confusion. Patient's cert with home health will  at the end of the week and RN to notify case management for discharge planning. Patient's wife is primary caretaker.

## 2020-07-28 NOTE — ROUTINE PROCESS
Verbal bedside report given to Asher Haque oncoming RN. Patient's situation, background, assessment and recommendations provided. Opportunity for questions provided. Oncoming RN assumed care of patient.

## 2020-07-28 NOTE — PROGRESS NOTES
Hospitalist Note     Admit Date:  2020 10:11 PM   Name:  Tristin Hwang   Age:  80 y.o.  :  1935   MRN:  [id number]   PCP:  Ramesh Boss MD  Treatment Team: Attending Provider: Caitlin Jimenez MD; Consulting Provider: Obi Ovalle MD; Consulting Provider: Ofelia Alba MD; Primary Nurse: Aden Tejada RN; Care Manager: Paxton Middleton RN    HPI/Subjective:   Mr. Tung White is a nice 81 y/o WM with a h/o atrial flutter, CHF s/p BiV PPM in 2019 who presented with progressive redness over his PPM site for a few days. WBCs mildly elevated at 13K, afebrile, labs otherwise normal. Admitted to hospitalist service. : Appears well. Redness over PPM the same; tender to touch, otherwise no issues or complaints. No other complaints  Objective:     Patient Vitals for the past 24 hrs:   Temp Pulse Resp BP SpO2   20 0849    142/71    20 0431 98.4 °F (36.9 °C) 68 18 137/74 97 %   20 0255 99 °F (37.2 °C) 69 18 138/69 97 %   20 2233     98 %   20 2218 99 °F (37.2 °C) 69 16 154/66 98 %     Oxygen Therapy  O2 Sat (%): 97 % (20 0431)  O2 Device: Room air (20 0745)    Estimated body mass index is 29.73 kg/m² as calculated from the following:    Height as of this encounter: 6' 1\" (1.854 m). Weight as of this encounter: 102.2 kg (225 lb 5 oz). Intake/Output Summary (Last 24 hours) at 2020 1143  Last data filed at 2020 0900  Gross per 24 hour   Intake 120 ml   Output    Net 120 ml       *Note that automatically entered I/Os may not be accurate; dependent on patient compliance with collection and accurate  by techs. General:    Well nourished. Alert. Appears stated age. CV:   RRR. No murmur, rub, or gallop. Lungs:   CTAB. No wheezing, rhonchi, or rales. Abdomen:   Soft, nontender, nondistended. Extremities: Warm and dry. No cyanosis or edema. Skin:     No rashes or jaundice.  Erythema and warmth overlying pacemaker battery site, below left clavicle; some induration of skin towards lower area of generator. No skin breakdown, drainage, fluctuance or crepitus. Neuro:  No gross focal deficits    Data Reviewed:  I have reviewed all labs, meds, and studies from the last 24 hours:  Recent Results (from the past 24 hour(s))   CBC WITH AUTOMATED DIFF    Collection Time: 07/27/20 10:26 PM   Result Value Ref Range    WBC 13.3 (H) 4.3 - 11.1 K/uL    RBC 4.80 4.23 - 5.6 M/uL    HGB 12.5 (L) 13.6 - 17.2 g/dL    HCT 40.0 (L) 41.1 - 50.3 %    MCV 83.3 79.6 - 97.8 FL    MCH 26.0 (L) 26.1 - 32.9 PG    MCHC 31.3 (L) 31.4 - 35.0 g/dL    RDW 15.1 (H) 11.9 - 14.6 %    PLATELET 981 508 - 869 K/uL    MPV 11.3 9.4 - 12.3 FL    ABSOLUTE NRBC 0.00 0.0 - 0.2 K/uL    DF AUTOMATED      NEUTROPHILS 73 43 - 78 %    LYMPHOCYTES 17 13 - 44 %    MONOCYTES 7 4.0 - 12.0 %    EOSINOPHILS 3 0.5 - 7.8 %    BASOPHILS 1 0.0 - 2.0 %    IMMATURE GRANULOCYTES 1 0.0 - 5.0 %    ABS. NEUTROPHILS 9.7 (H) 1.7 - 8.2 K/UL    ABS. LYMPHOCYTES 2.2 0.5 - 4.6 K/UL    ABS. MONOCYTES 0.9 0.1 - 1.3 K/UL    ABS. EOSINOPHILS 0.4 0.0 - 0.8 K/UL    ABS. BASOPHILS 0.1 0.0 - 0.2 K/UL    ABS. IMM. GRANS. 0.1 0.0 - 0.5 K/UL   METABOLIC PANEL, COMPREHENSIVE    Collection Time: 07/27/20 10:26 PM   Result Value Ref Range    Sodium 136 136 - 145 mmol/L    Potassium 4.3 3.5 - 5.1 mmol/L    Chloride 102 98 - 107 mmol/L    CO2 30 21 - 32 mmol/L    Anion gap 4 (L) 7 - 16 mmol/L    Glucose 211 (H) 65 - 100 mg/dL    BUN 32 (H) 8 - 23 MG/DL    Creatinine 1.70 (H) 0.8 - 1.5 MG/DL    GFR est AA 50 (L) >60 ml/min/1.73m2    GFR est non-AA 41 (L) >60 ml/min/1.73m2    Calcium 8.7 8.3 - 10.4 MG/DL    Bilirubin, total 0.4 0.2 - 1.1 MG/DL    ALT (SGPT) 34 12 - 65 U/L    AST (SGOT) 35 15 - 37 U/L    Alk.  phosphatase 88 50 - 136 U/L    Protein, total 7.9 6.3 - 8.2 g/dL    Albumin 2.8 (L) 3.2 - 4.6 g/dL    Globulin 5.1 (H) 2.3 - 3.5 g/dL    A-G Ratio 0.5 (L) 1.2 - 3.5     CULTURE, BLOOD Collection Time: 07/27/20 11:20 PM    Specimen: Blood   Result Value Ref Range    Special Requests: RIGHT  HAND        Culture result: NO GROWTH AFTER 8 HOURS     CULTURE, BLOOD    Collection Time: 07/27/20 11:21 PM    Specimen: Blood   Result Value Ref Range    Special Requests: RIGHT  FOREARM        Culture result: NO GROWTH AFTER 8 HOURS     METABOLIC PANEL, BASIC    Collection Time: 07/28/20  3:48 AM   Result Value Ref Range    Sodium 140 136 - 145 mmol/L    Potassium 3.9 3.5 - 5.1 mmol/L    Chloride 104 98 - 107 mmol/L    CO2 31 21 - 32 mmol/L    Anion gap 5 (L) 7 - 16 mmol/L    Glucose 132 (H) 65 - 100 mg/dL    BUN 32 (H) 8 - 23 MG/DL    Creatinine 1.59 (H) 0.8 - 1.5 MG/DL    GFR est AA 53 (L) >60 ml/min/1.73m2    GFR est non-AA 44 (L) >60 ml/min/1.73m2    Calcium 8.8 8.3 - 10.4 MG/DL   CBC WITH AUTOMATED DIFF    Collection Time: 07/28/20  3:48 AM   Result Value Ref Range    WBC 13.3 (H) 4.3 - 11.1 K/uL    RBC 4.38 4.23 - 5.6 M/uL    HGB 11.4 (L) 13.6 - 17.2 g/dL    HCT 37.0 (L) 41.1 - 50.3 %    MCV 84.5 79.6 - 97.8 FL    MCH 26.0 (L) 26.1 - 32.9 PG    MCHC 30.8 (L) 31.4 - 35.0 g/dL    RDW 15.0 (H) 11.9 - 14.6 %    PLATELET 630 442 - 604 K/uL    MPV 10.8 9.4 - 12.3 FL    ABSOLUTE NRBC 0.00 0.0 - 0.2 K/uL    DF AUTOMATED      NEUTROPHILS 69 43 - 78 %    LYMPHOCYTES 20 13 - 44 %    MONOCYTES 8 4.0 - 12.0 %    EOSINOPHILS 3 0.5 - 7.8 %    BASOPHILS 1 0.0 - 2.0 %    IMMATURE GRANULOCYTES 1 0.0 - 5.0 %    ABS. NEUTROPHILS 9.1 (H) 1.7 - 8.2 K/UL    ABS. LYMPHOCYTES 2.6 0.5 - 4.6 K/UL    ABS. MONOCYTES 1.0 0.1 - 1.3 K/UL    ABS. EOSINOPHILS 0.4 0.0 - 0.8 K/UL    ABS. BASOPHILS 0.1 0.0 - 0.2 K/UL    ABS. IMM.  GRANS. 0.1 0.0 - 0.5 K/UL        Current Meds:  Current Facility-Administered Medications   Medication Dose Route Frequency    atorvastatin (LIPITOR) tablet 80 mg  80 mg Oral QHS    colchicine tablet 0.6 mg  0.6 mg Oral DAILY PRN    escitalopram oxalate (LEXAPRO) tablet 10 mg  10 mg Oral DAILY    hydroCHLOROthiazide (MICROZIDE) capsule 12.5 mg  12.5 mg Oral DAILY    levothyroxine (SYNTHROID) tablet 137 mcg  137 mcg Oral 6am    losartan (COZAAR) tablet 100 mg  100 mg Oral DAILY    magnesium oxide (MAG-OX) tablet 400 mg  400 mg Oral DAILY    pantoprazole (PROTONIX) tablet 40 mg  40 mg Oral ACB    sodium chloride (NS) flush 5-40 mL  5-40 mL IntraVENous Q8H    sodium chloride (NS) flush 5-40 mL  5-40 mL IntraVENous PRN    acetaminophen (TYLENOL) tablet 650 mg  650 mg Oral Q4H PRN    ondansetron (ZOFRAN) injection 4 mg  4 mg IntraVENous Q4H PRN    magnesium hydroxide (MILK OF MAGNESIA) 400 mg/5 mL oral suspension 30 mL  30 mL Oral DAILY PRN    0.9% sodium chloride infusion  75 mL/hr IntraVENous CONTINUOUS    enoxaparin (LOVENOX) injection 40 mg  40 mg SubCUTAneous Q24H    vancomycin (VANCOCIN) 1250 mg in  ml infusion  1,250 mg IntraVENous Q24H    amLODIPine (NORVASC) tablet 2.5 mg  2.5 mg Oral DAILY       Other Studies:  No results found for this visit on 07/27/20. Xr Chest Pa Lat    Result Date: 7/28/2020  EXAM: Chest x-ray. INDICATION: Pain and erythema adjacent to the pacemaker. COMPARISON: Prior chest x-ray on March 15, 2020. TECHNIQUE: Frontal and lateral view chest x-ray. FINDINGS: The lungs are clear. The cardiac size, mediastinal contour and pulmonary vasculature are normal. No pneumothorax or pleural effusion is seen. Again noted are postsurgical changes from a prior CABG, as well as a left chest wall biventricular pacemaker. The pacemaker is unchanged in appearance, and its leads are intact. IMPRESSION: No acute process.       All Micro Results     Procedure Component Value Units Date/Time    CULTURE, BLOOD [[id number]] Collected:  07/27/20 2320    Order Status:  Completed Specimen:  Blood Updated:  07/28/20 0859     Special Requests: --        RIGHT  HAND       Culture result: NO GROWTH AFTER 8 HOURS       CULTURE, BLOOD [[id number]] Collected:  07/27/20 2321    Order Status: Completed Specimen:  Blood Updated:  07/28/20 0859     Special Requests: --        RIGHT  FOREARM       Culture result: NO GROWTH AFTER 8 HOURS             SARS-CoV-2 Lab Results  \"Novel Coronavirus\" Test: No results found for: COV2NT   \"Emergent Disease\" Test: No results found for: EDPR  \"SARS-COV-2\" Test: No results found for: XGCOVT  \"Precision Labs\" Test: No results found for: RSLT  Rapid Test: No results found for: COVR         Assessment and Plan:     Hospital Problems as of 7/28/2020 Date Reviewed: 10/24/2019          Codes Class Noted - Resolved POA    Leukocytosis ICD-10-CM: D72.829  ICD-9-CM: 288.60  7/28/2020 - Present Yes        Normocytic anemia ICD-10-CM: D64.9  ICD-9-CM: 285.9  7/28/2020 - Present Yes        * (Principal) Cellulitis ICD-10-CM: L03.90  ICD-9-CM: 682.9  7/28/2020 - Present Yes        Chronic kidney disease (CKD), stage III (moderate) ([facility] ICD-10-CM: N18.3  ICD-9-CM: 585.3  9/19/2016 - Present Yes        Paroxysmal atrial fibrillation ([facility] ICD-10-CM: I48.0  ICD-9-CM: 427.31  6/18/2014 - Present Yes    Overview Addendum 12/13/2018  1:44 PM by George Velazquez MD     1. Atrial flutter Diagnosed 6/18/14: s/p ablation. 2. Echo (6/19/14): EF 45-50%. Moderate mitral regurgitation. 3. Ecardio (8/12-8/25/14): No atrial arrhythmias. 4.  Admitted with CVA (3/18): Atrial fibrillation noted. Eliquis started. History of CVA (cerebrovascular accident) ICD-10-CM: Z86.73  ICD-9-CM: V12.54  6/18/2014 - Present Yes    Overview Signed 9/29/2015 12:53 PM by Candy Mckeon     History of mini-strokes. Hypertension ICD-10-CM: I10  ICD-9-CM: 401.9  4/16/2013 - Present Yes        CAD (coronary artery disease) ICD-10-CM: I25.10  ICD-9-CM: 414.00  4/16/2013 - Present Yes    Overview Addendum 9/29/2015 12:49 PM by Candy Mckeon     1. CABG 2005: LIMA to LAD, SVG to diagonal, SVG to OM and SVG to RCA  2. NSTEMI (12/11/13): Peak troponin 4.66.   A. LHC: Severe 3 vessel CAD.  Occluded SVG to PDA and diagonal. Stenosis in SVG to OM. B. PCI of SVG to OM: Distal body treated with 4 x 18 mm vision BMS. Prox body lesion treated with integrity 4 x 18 BMS. C. Echo: Normal LV systolic function. EF 55-60%. Mild mitral/tricuspid regurgitation. 3. Echo (9/23/14): EF 50-55%. Mild LAE. Mild mitral regurgitation. Hypothyroidism ICD-10-CM: E03.9  ICD-9-CM: 244.9  4/16/2013 - Present Yes        Hyperlipidemia ICD-10-CM: E78.5  ICD-9-CM: 272.4  4/16/2013 - Present Yes        GERD (gastroesophageal reflux disease) ICD-10-CM: K21.9  ICD-9-CM: 530.81  4/16/2013 - Present Yes              Plan:  # Cellulitis   - Overlying pacemaker area. No fluctuance or drainage. Con't IV Vancomycin. ID to see. Blood cultures collected at admission, NTD. Afebrile. WBCs stable at 13K. US chest pending. # Hypothyroidism   - Synthroid    # HTN   - Home meds    # CAD   - Statin. # MDD   - Lexapro    # GERD   - PPI    DC planning/Dispo: Unclear.    Diet:  DIET CARDIAC  DVT ppx: Lovenox    Signed:  Conor Ruth MD

## 2020-07-28 NOTE — ED TRIAGE NOTES
EMS: Pt arriving from home via [employer]. Called out for infected pacemaker. Pt has reddening to inflammation to chest above pacemaker. Pacemaker was placed a few years ago. Pt called cardiologist and was told to come to the ED. VSS with EMS. Wife is caregiver and is in the lobby if staff need information. Pt is poor historian. Pt has extensive medical hx including stroke and CABG. Followed by Dr Seble Dia    Pt alert and oriented on arrival to ED, but is slow to answer. Pt has no complaints. Redness and some heat noted to area above pacemaker. PT sts he does not know when it started. Pt denies exposure to sick persons. Pt denies cough, fever, shortness of breath ( yet produced junky cough during triage).

## 2020-07-29 ENCOUNTER — ANESTHESIA EVENT (OUTPATIENT)
Dept: SURGERY | Age: 85
DRG: 243 | End: 2020-07-29
Payer: MEDICARE

## 2020-07-29 ENCOUNTER — ANESTHESIA (OUTPATIENT)
Dept: SURGERY | Age: 85
DRG: 243 | End: 2020-07-29
Payer: MEDICARE

## 2020-07-29 LAB
ANION GAP SERPL CALC-SCNC: 7 MMOL/L (ref 7–16)
ATRIAL RATE: 69 BPM
BASOPHILS # BLD: 0.1 K/UL (ref 0–0.2)
BASOPHILS NFR BLD: 1 % (ref 0–2)
BUN SERPL-MCNC: 29 MG/DL (ref 8–23)
CALCIUM SERPL-MCNC: 8.2 MG/DL (ref 8.3–10.4)
CALCULATED R AXIS, ECG10: -90 DEGREES
CALCULATED T AXIS, ECG11: 67 DEGREES
CHLORIDE SERPL-SCNC: 107 MMOL/L (ref 98–107)
CO2 SERPL-SCNC: 29 MMOL/L (ref 21–32)
CREAT SERPL-MCNC: 1.54 MG/DL (ref 0.8–1.5)
DIAGNOSIS, 93000: NORMAL
DIFFERENTIAL METHOD BLD: ABNORMAL
EOSINOPHIL # BLD: 0.5 K/UL (ref 0–0.8)
EOSINOPHIL NFR BLD: 4 % (ref 0.5–7.8)
ERYTHROCYTE [DISTWIDTH] IN BLOOD BY AUTOMATED COUNT: 15 % (ref 11.9–14.6)
GLUCOSE SERPL-MCNC: 102 MG/DL (ref 65–100)
HCT VFR BLD AUTO: 33.6 % (ref 41.1–50.3)
HGB BLD-MCNC: 11 G/DL (ref 13.6–17.2)
IMM GRANULOCYTES # BLD AUTO: 0.1 K/UL (ref 0–0.5)
IMM GRANULOCYTES NFR BLD AUTO: 1 % (ref 0–5)
LYMPHOCYTES # BLD: 2.7 K/UL (ref 0.5–4.6)
LYMPHOCYTES NFR BLD: 25 % (ref 13–44)
MCH RBC QN AUTO: 26.9 PG (ref 26.1–32.9)
MCHC RBC AUTO-ENTMCNC: 32.7 G/DL (ref 31.4–35)
MCV RBC AUTO: 82.2 FL (ref 79.6–97.8)
MONOCYTES # BLD: 0.8 K/UL (ref 0.1–1.3)
MONOCYTES NFR BLD: 7 % (ref 4–12)
NEUTS SEG # BLD: 7 K/UL (ref 1.7–8.2)
NEUTS SEG NFR BLD: 63 % (ref 43–78)
NRBC # BLD: 0 K/UL (ref 0–0.2)
P-R INTERVAL, ECG05: 186 MS
PLATELET # BLD AUTO: 261 K/UL (ref 150–450)
PMV BLD AUTO: 10.6 FL (ref 9.4–12.3)
POTASSIUM SERPL-SCNC: 3.8 MMOL/L (ref 3.5–5.1)
Q-T INTERVAL, ECG07: 492 MS
QRS DURATION, ECG06: 174 MS
QTC CALCULATION (BEZET), ECG08: 523 MS
RBC # BLD AUTO: 4.09 M/UL (ref 4.23–5.6)
SODIUM SERPL-SCNC: 143 MMOL/L (ref 136–145)
VANCOMYCIN TROUGH SERPL-MCNC: 16 UG/ML (ref 5–20)
VENTRICULAR RATE, ECG03: 68 BPM
WBC # BLD AUTO: 11 K/UL (ref 4.3–11.1)

## 2020-07-29 PROCEDURE — 77030013687 HC GD NDL BARD -B

## 2020-07-29 PROCEDURE — 74011636320 HC RX REV CODE- 636/320: Performed by: INTERNAL MEDICINE

## 2020-07-29 PROCEDURE — 74011250636 HC RX REV CODE- 250/636: Performed by: NURSE ANESTHETIST, CERTIFIED REGISTERED

## 2020-07-29 PROCEDURE — 74011250636 HC RX REV CODE- 250/636: Performed by: FAMILY MEDICINE

## 2020-07-29 PROCEDURE — 36005 INJECTION EXT VENOGRAPHY: CPT

## 2020-07-29 PROCEDURE — 65660000000 HC RM CCU STEPDOWN

## 2020-07-29 PROCEDURE — 93005 ELECTROCARDIOGRAM TRACING: CPT | Performed by: INTERNAL MEDICINE

## 2020-07-29 PROCEDURE — 74011250636 HC RX REV CODE- 250/636: Performed by: INTERNAL MEDICINE

## 2020-07-29 PROCEDURE — 77030040361 HC SLV COMPR DVT MDII -B

## 2020-07-29 PROCEDURE — 74011250637 HC RX REV CODE- 250/637: Performed by: INTERNAL MEDICINE

## 2020-07-29 PROCEDURE — 74011000258 HC RX REV CODE- 258: Performed by: INTERNAL MEDICINE

## 2020-07-29 PROCEDURE — 76060000032 HC ANESTHESIA 0.5 TO 1 HR: Performed by: INTERNAL MEDICINE

## 2020-07-29 PROCEDURE — 74011000250 HC RX REV CODE- 250: Performed by: NURSE ANESTHETIST, CERTIFIED REGISTERED

## 2020-07-29 PROCEDURE — 77030010545

## 2020-07-29 PROCEDURE — 80202 ASSAY OF VANCOMYCIN: CPT

## 2020-07-29 PROCEDURE — 36415 COLL VENOUS BLD VENIPUNCTURE: CPT

## 2020-07-29 PROCEDURE — 85025 COMPLETE CBC W/AUTO DIFF WBC: CPT

## 2020-07-29 PROCEDURE — 80048 BASIC METABOLIC PNL TOTAL CA: CPT

## 2020-07-29 PROCEDURE — 77030040829 HC CATH EXT URINE MDII -B

## 2020-07-29 PROCEDURE — 77030033296 HC DIL ST PUNC SITE DISP COOK -B

## 2020-07-29 PROCEDURE — 74011250637 HC RX REV CODE- 250/637: Performed by: FAMILY MEDICINE

## 2020-07-29 PROCEDURE — C1769 GUIDE WIRE: HCPCS

## 2020-07-29 PROCEDURE — 99232 SBSQ HOSP IP/OBS MODERATE 35: CPT | Performed by: INTERNAL MEDICINE

## 2020-07-29 PROCEDURE — B5181ZZ FLUOROSCOPY OF SUPERIOR VENA CAVA USING LOW OSMOLAR CONTRAST: ICD-10-PCS | Performed by: INTERNAL MEDICINE

## 2020-07-29 PROCEDURE — 74011000250 HC RX REV CODE- 250: Performed by: INTERNAL MEDICINE

## 2020-07-29 PROCEDURE — C1894 INTRO/SHEATH, NON-LASER: HCPCS

## 2020-07-29 PROCEDURE — 75820 VEIN X-RAY ARM/LEG: CPT

## 2020-07-29 RX ORDER — FENTANYL CITRATE 50 UG/ML
INJECTION, SOLUTION INTRAMUSCULAR; INTRAVENOUS AS NEEDED
Status: DISCONTINUED | OUTPATIENT
Start: 2020-07-29 | End: 2020-07-29 | Stop reason: HOSPADM

## 2020-07-29 RX ORDER — PROPOFOL 10 MG/ML
INJECTION, EMULSION INTRAVENOUS AS NEEDED
Status: DISCONTINUED | OUTPATIENT
Start: 2020-07-29 | End: 2020-07-29 | Stop reason: HOSPADM

## 2020-07-29 RX ORDER — SODIUM CHLORIDE, SODIUM LACTATE, POTASSIUM CHLORIDE, CALCIUM CHLORIDE 600; 310; 30; 20 MG/100ML; MG/100ML; MG/100ML; MG/100ML
INJECTION, SOLUTION INTRAVENOUS
Status: DISCONTINUED | OUTPATIENT
Start: 2020-07-29 | End: 2020-07-29 | Stop reason: HOSPADM

## 2020-07-29 RX ORDER — PROPOFOL 10 MG/ML
INJECTION, EMULSION INTRAVENOUS
Status: DISCONTINUED | OUTPATIENT
Start: 2020-07-29 | End: 2020-07-29 | Stop reason: HOSPADM

## 2020-07-29 RX ORDER — LIDOCAINE HYDROCHLORIDE 10 MG/ML
10 INJECTION INFILTRATION; PERINEURAL ONCE
Status: ACTIVE | OUTPATIENT
Start: 2020-07-29 | End: 2020-07-30

## 2020-07-29 RX ORDER — LIDOCAINE HYDROCHLORIDE 10 MG/ML
30 INJECTION INFILTRATION; PERINEURAL ONCE
Status: COMPLETED | OUTPATIENT
Start: 2020-07-29 | End: 2020-07-29

## 2020-07-29 RX ORDER — HEPARIN SODIUM 200 [USP'U]/100ML
1000 INJECTION, SOLUTION INTRAVENOUS AS NEEDED
Status: DISCONTINUED | OUTPATIENT
Start: 2020-07-29 | End: 2020-07-31 | Stop reason: HOSPADM

## 2020-07-29 RX ORDER — CEFAZOLIN SODIUM/WATER 2 G/20 ML
SYRINGE (ML) INTRAVENOUS AS NEEDED
Status: DISCONTINUED | OUTPATIENT
Start: 2020-07-29 | End: 2020-07-29 | Stop reason: HOSPADM

## 2020-07-29 RX ORDER — CEFAZOLIN SODIUM/WATER 2 G/20 ML
2 SYRINGE (ML) INTRAVENOUS ONCE
Status: DISCONTINUED | OUTPATIENT
Start: 2020-07-29 | End: 2020-07-29 | Stop reason: SDUPTHER

## 2020-07-29 RX ORDER — LIDOCAINE HYDROCHLORIDE 20 MG/ML
INJECTION, SOLUTION EPIDURAL; INFILTRATION; INTRACAUDAL; PERINEURAL AS NEEDED
Status: DISCONTINUED | OUTPATIENT
Start: 2020-07-29 | End: 2020-07-29 | Stop reason: HOSPADM

## 2020-07-29 RX ORDER — HEPARIN SODIUM 200 [USP'U]/100ML
2000 INJECTION, SOLUTION INTRAVENOUS AS NEEDED
Status: DISCONTINUED | OUTPATIENT
Start: 2020-07-29 | End: 2020-07-31 | Stop reason: HOSPADM

## 2020-07-29 RX ORDER — NEOMYCIN AND POLYMYXIN B SULFATES AND BACITRACIN ZINC 400; 3.5; 1 [USP'U]/G; MG/G; [USP'U]/G
OINTMENT OPHTHALMIC 4 TIMES DAILY
Status: DISCONTINUED | OUTPATIENT
Start: 2020-07-29 | End: 2020-07-31 | Stop reason: HOSPADM

## 2020-07-29 RX ADMIN — IOPAMIDOL 20 ML: 755 INJECTION, SOLUTION INTRAVENOUS at 17:00

## 2020-07-29 RX ADMIN — Medication 5 ML: at 21:15

## 2020-07-29 RX ADMIN — SODIUM CHLORIDE, SODIUM LACTATE, POTASSIUM CHLORIDE, AND CALCIUM CHLORIDE: 600; 310; 30; 20 INJECTION, SOLUTION INTRAVENOUS at 18:37

## 2020-07-29 RX ADMIN — FENTANYL CITRATE 25 MCG: 50 INJECTION INTRAMUSCULAR; INTRAVENOUS at 18:54

## 2020-07-29 RX ADMIN — PANTOPRAZOLE SODIUM 40 MG: 40 TABLET, DELAYED RELEASE ORAL at 09:08

## 2020-07-29 RX ADMIN — MAGNESIUM GLUCONATE 500 MG ORAL TABLET 400 MG: 500 TABLET ORAL at 09:08

## 2020-07-29 RX ADMIN — ATORVASTATIN CALCIUM 80 MG: 80 TABLET, FILM COATED ORAL at 21:18

## 2020-07-29 RX ADMIN — HEPARIN SODIUM 4000 UNITS: 200 INJECTION, SOLUTION INTRAVENOUS at 18:51

## 2020-07-29 RX ADMIN — HYDROCHLOROTHIAZIDE 12.5 MG: 12.5 CAPSULE ORAL at 09:09

## 2020-07-29 RX ADMIN — PROPOFOL 100 MCG/KG/MIN: 10 INJECTION, EMULSION INTRAVENOUS at 18:50

## 2020-07-29 RX ADMIN — LEVOTHYROXINE SODIUM 137 MCG: 0.11 TABLET ORAL at 06:38

## 2020-07-29 RX ADMIN — LIDOCAINE HYDROCHLORIDE 20 ML: 10 INJECTION, SOLUTION INFILTRATION; PERINEURAL at 18:52

## 2020-07-29 RX ADMIN — LOSARTAN POTASSIUM 100 MG: 50 TABLET, FILM COATED ORAL at 09:08

## 2020-07-29 RX ADMIN — Medication 2 G: at 18:56

## 2020-07-29 RX ADMIN — NEOMYCIN SULFATE, POLYMYXIN B SULFATE AND BACITRACIN ZINC: 3.5; 10000; 4 OINTMENT OPHTHALMIC at 10:35

## 2020-07-29 RX ADMIN — CEFEPIME HYDROCHLORIDE 1 G: 1 INJECTION, POWDER, FOR SOLUTION INTRAMUSCULAR; INTRAVENOUS at 21:07

## 2020-07-29 RX ADMIN — PHENYLEPHRINE HYDROCHLORIDE 100 MCG: 10 INJECTION INTRAVENOUS at 19:22

## 2020-07-29 RX ADMIN — ESCITALOPRAM OXALATE 10 MG: 10 TABLET ORAL at 09:08

## 2020-07-29 RX ADMIN — AMLODIPINE BESYLATE 2.5 MG: 5 TABLET ORAL at 09:08

## 2020-07-29 RX ADMIN — LIDOCAINE HYDROCHLORIDE 50 MG: 20 INJECTION, SOLUTION EPIDURAL; INFILTRATION; INTRACAUDAL; PERINEURAL at 18:50

## 2020-07-29 RX ADMIN — Medication 10 ML: at 07:26

## 2020-07-29 RX ADMIN — SODIUM CHLORIDE 75 ML/HR: 9 INJECTION, SOLUTION INTRAVENOUS at 06:02

## 2020-07-29 RX ADMIN — NEOMYCIN SULFATE, POLYMYXIN B SULFATE AND BACITRACIN ZINC: 3.5; 10000; 4 OINTMENT OPHTHALMIC at 21:14

## 2020-07-29 RX ADMIN — PROPOFOL 30 MG: 10 INJECTION, EMULSION INTRAVENOUS at 18:50

## 2020-07-29 RX ADMIN — VANCOMYCIN HYDROCHLORIDE 1250 MG: 10 INJECTION, POWDER, LYOPHILIZED, FOR SOLUTION INTRAVENOUS at 22:22

## 2020-07-29 RX ADMIN — CEFEPIME HYDROCHLORIDE 1 G: 1 INJECTION, POWDER, FOR SOLUTION INTRAMUSCULAR; INTRAVENOUS at 06:02

## 2020-07-29 RX ADMIN — PHENYLEPHRINE HYDROCHLORIDE 100 MCG: 10 INJECTION INTRAVENOUS at 19:07

## 2020-07-29 NOTE — ROUTINE PROCESS
Bedside and Verbal shift change report to be given to self (oncoming nurse) by hPoenix Feldman RN (offgoing nurse). Report included the following information SBAR, Kardex, Procedure Summary, Intake/Output and MAR.

## 2020-07-29 NOTE — PROGRESS NOTES
[facility] CARDIOLOGY PROGRESS NOTE           7/29/2020 7:10 AM    Admit Date: 7/27/2020    Admit Diagnosis: Cellulitis [L03.90]    Assessment:   Principal Problem:    Cellulitis (7/28/2020)    Active Problems:    Hypertension (4/16/2013)      CAD (coronary artery disease) (4/16/2013)      Overview: 1. CABG 2005: LIMA to LAD, SVG to diagonal, SVG to OM and SVG to RCA      2. NSTEMI (12/11/13): Peak troponin 4.66.       A. LHC: Severe 3 vessel CAD. Occluded SVG to PDA and diagonal. Stenosis       in SVG to OM. B. PCI of SVG to OM: Distal body treated with 4 x 18 mm vision BMS. Prox       body lesion treated with integrity 4 x 18 BMS. C. Echo: Normal LV systolic function. EF 55-60%. Mild mitral/tricuspid       regurgitation. 3. Echo (9/23/14): EF 50-55%. Mild LAE. Mild mitral regurgitation. Hypothyroidism (4/16/2013)      Hyperlipidemia (4/16/2013)      GERD (gastroesophageal reflux disease) (4/16/2013)      Paroxysmal atrial fibrillation ([facility] (6/18/2014)      Overview: 1. Atrial flutter Diagnosed 6/18/14: s/p ablation. 2. Echo (6/19/14): EF 45-50%. Moderate mitral regurgitation. 3. Ecardio (8/12-8/25/14): No atrial arrhythmias. 4.  Admitted with CVA (3/18): Atrial fibrillation noted. Eliquis       started. History of CVA (cerebrovascular accident) (6/18/2014)      Overview: History of mini-strokes. Chronic kidney disease (CKD), stage III (moderate) (HCC) (9/19/2016)      Leukocytosis (7/28/2020)      Normocytic anemia (7/28/2020)      Plan:   1. Pocket infection - will need extraction. He is in heart block so will need 100% pacing requirement. Micra is useful in this situation. Given his lack of mobility and older age in the setting of a pocket infection, this is reasonable. He does not demonstrate signs of systemic infection so will look to implant Micra today and extract tomorrow. Hibiclens bath. 2.   Infection - he did have an E.  Coli infection in March, potential seeding then? Continue abx and appreciate ID input. 3.   FEN/GI - NPO. 4.   CAD - OMT. 5.   Dispo - inpatient requirement, anticipate 48-72 hours of ongoing hospital care. PACEMAKER/MICRA  Additionally, I discussed with the patient the potential risks of pacemaker implantation, including the risk of bleeding, infection, venous occlusion, DVT/PE, pneumothorax, cardiac tamponade, perforation, need for urgent open heart surgery, device/lead failure, lead dislodgement, heart attack, stroke, arrhythmia, radiation skin injury, kidney damage/failure oversedation, respiratory arrest, and even death. The patient understands these risks in the context of the potential benefits of the device implantation, and agrees to proceed. The patient was counseled at length about the risks of kain Covid-19 during their perioperative period and any recovery window from their procedure. The patient was made aware that kain Covid-19  may worsen their prognosis for recovering from their procedure and lend to a higher morbidity and/or mortality risk. All material risks, benefits, and reasonable alternatives including postponing the procedure were discussed. The patient does wish to proceed with the procedure at this time. TOTAL TIME: 25 minutes, >50% during counseling and coordination of care       Thank you for allowing me to participate in the electrophysiologic care of this most pleasant patient. Please feel free to contact me if there are any questions or concerns. Mela Newsome. Sumeet Griggs MD, MS  Clinical Cardiac Electrophysiology  [facility] Cardiology    Subjective:   No complaints this AM, no chest pain or shortness of breath.     Interval History: (History of pertinent interval events obtained from nursing staff)    ROS:  GEN:  No fever or chills  Cardiovascular:  As noted above  Pulmonary:  As noted above  Neuro:  No new focal motor or sensory loss      Objective: Vitals:    07/28/20 1649 07/28/20 2104 07/29/20 0038 07/29/20 0442   BP: 146/71 127/63 142/70 142/72   Pulse: 67 72 67 67   Resp: 18 18 18 18   Temp: 98 °F (36.7 °C) 97.5 °F (36.4 °C) 98.3 °F (36.8 °C) 97.8 °F (36.6 °C)   SpO2: 97% 93% 95% 97%   Weight:    223 lb 5.2 oz (101.3 kg)   Height:           Physical Exam:  General-Well Developed, Well Nourished, No Acute Distress, Alert & Oriented x 3, appropriate mood. Neck- supple, no JVD  CV- regular rate and rhythm no MRG, area of redness around left sided CIED pocket, indurated/fluctuant. Lung- clear bilaterally  Abd- soft, nontender, nondistended  Ext- no edema bilaterally.   Skin- warm and dry    Current Facility-Administered Medications   Medication Dose Route Frequency    atorvastatin (LIPITOR) tablet 80 mg  80 mg Oral QHS    colchicine tablet 0.6 mg  0.6 mg Oral DAILY PRN    escitalopram oxalate (LEXAPRO) tablet 10 mg  10 mg Oral DAILY    hydroCHLOROthiazide (MICROZIDE) capsule 12.5 mg  12.5 mg Oral DAILY    levothyroxine (SYNTHROID) tablet 137 mcg  137 mcg Oral 6am    losartan (COZAAR) tablet 100 mg  100 mg Oral DAILY    magnesium oxide (MAG-OX) tablet 400 mg  400 mg Oral DAILY    pantoprazole (PROTONIX) tablet 40 mg  40 mg Oral ACB    sodium chloride (NS) flush 5-40 mL  5-40 mL IntraVENous Q8H    sodium chloride (NS) flush 5-40 mL  5-40 mL IntraVENous PRN    acetaminophen (TYLENOL) tablet 650 mg  650 mg Oral Q4H PRN    ondansetron (ZOFRAN) injection 4 mg  4 mg IntraVENous Q4H PRN    magnesium hydroxide (MILK OF MAGNESIA) 400 mg/5 mL oral suspension 30 mL  30 mL Oral DAILY PRN    0.9% sodium chloride infusion  75 mL/hr IntraVENous CONTINUOUS    enoxaparin (LOVENOX) injection 40 mg  40 mg SubCUTAneous Q24H    vancomycin (VANCOCIN) 1250 mg in  ml infusion  1,250 mg IntraVENous Q24H    amLODIPine (NORVASC) tablet 2.5 mg  2.5 mg Oral DAILY    cefepime (MAXIPIME) 1 g in 0.9% sodium chloride (MBP/ADV) 50 mL  1 g IntraVENous Q12H Data Review:   Recent Results (from the past 24 hour(s))   METABOLIC PANEL, BASIC    Collection Time: 07/29/20  3:25 AM   Result Value Ref Range    Sodium 143 136 - 145 mmol/L    Potassium 3.8 3.5 - 5.1 mmol/L    Chloride 107 98 - 107 mmol/L    CO2 29 21 - 32 mmol/L    Anion gap 7 7 - 16 mmol/L    Glucose 102 (H) 65 - 100 mg/dL    BUN 29 (H) 8 - 23 MG/DL    Creatinine 1.54 (H) 0.8 - 1.5 MG/DL    GFR est AA 55 (L) >60 ml/min/1.73m2    GFR est non-AA 46 (L) >60 ml/min/1.73m2    Calcium 8.2 (L) 8.3 - 10.4 MG/DL   CBC WITH AUTOMATED DIFF    Collection Time: 07/29/20  3:25 AM   Result Value Ref Range    WBC 11.0 4.3 - 11.1 K/uL    RBC 4.09 (L) 4.23 - 5.6 M/uL    HGB 11.0 (L) 13.6 - 17.2 g/dL    HCT 33.6 (L) 41.1 - 50.3 %    MCV 82.2 79.6 - 97.8 FL    MCH 26.9 26.1 - 32.9 PG    MCHC 32.7 31.4 - 35.0 g/dL    RDW 15.0 (H) 11.9 - 14.6 %    PLATELET 440 245 - 563 K/uL    MPV 10.6 9.4 - 12.3 FL    ABSOLUTE NRBC 0.00 0.0 - 0.2 K/uL    DF AUTOMATED      NEUTROPHILS 63 43 - 78 %    LYMPHOCYTES 25 13 - 44 %    MONOCYTES 7 4.0 - 12.0 %    EOSINOPHILS 4 0.5 - 7.8 %    BASOPHILS 1 0.0 - 2.0 %    IMMATURE GRANULOCYTES 1 0.0 - 5.0 %    ABS. NEUTROPHILS 7.0 1.7 - 8.2 K/UL    ABS. LYMPHOCYTES 2.7 0.5 - 4.6 K/UL    ABS. MONOCYTES 0.8 0.1 - 1.3 K/UL    ABS. EOSINOPHILS 0.5 0.0 - 0.8 K/UL    ABS. BASOPHILS 0.1 0.0 - 0.2 K/UL    ABS. IMM. GRANS. 0.1 0.0 - 0.5 K/UL       EKG:  (EKG has been independently visualized by me with interpretation below): Ventricular pacing. ECHO: 7/28/2020  -  Left ventricle: Systolic function was normal. Ejection fraction was  estimated in the range of 55 % to 60 %. There were no regional wall motion  abnormalities.     -  Inferior vena cava, hepatic veins: The respirophasic change in diameter   Was more than 50%.    -  Tricuspid valve: There was mild regurgitation.

## 2020-07-29 NOTE — ROUTINE PROCESS
Verbal bedside report received from Caitlin Aguillon, [facility]. Assumed care of patient.

## 2020-07-29 NOTE — PROGRESS NOTES
Report received from [id number] and Clementina Sewell. Procedural findings communicated. Intra procedural  medication administration reviewed. Progression of care discussed.      Patient received into [id number] [facility] 5 post sheath removal.     Right groin access site without bleeding or swelling     Dressing dry and intact     Patient instructed to limit movement to right lower extremity    Routine post procedural vital signs and site assessment initiated

## 2020-07-29 NOTE — ROUTINE PROCESS
Verbal bedside report given to Mandeep Mensah oncoming RN. Patient's situation, background, assessment and recommendations provided. Opportunity for questions provided. Oncoming RN assumed care of patient. Pt has not returned back to floor from procedure.

## 2020-07-29 NOTE — PROGRESS NOTES
Infectious Disease Note    Today's Date: 2020   Admit Date: 2020    Impression:   · PPM pocket infection  · Hx of CHB s/p PPM (2019)  · OA s/p courtney TKAs     Plan:   ·  Continue Vancomycin, cefepime  · Follow BCs  · Micra placement today with PM extraction tomorrow. Would like at least 72h between extraction and PPM placement. Anti-infectives:   · Vanc/Cefepime -    Subjective:   Afebrile, no complaints. Wife not present     Allergies   Allergen Reactions    Other Medication Other (comments)     Wife reports an unknown reaction to an unknown pain medicine. Cannot recall name or details. Review of Systems:  A comprehensive review of systems was negative except for that written in the History of Present Illness. Objective:     Visit Vitals  /61   Pulse 69   Temp 98.5 °F (36.9 °C)   Resp 18   Ht 6' 1\" (1.854 m)   Wt 101.3 kg (223 lb 5.2 oz)   SpO2 96%   BMI 29.46 kg/m²     Temp (24hrs), Av.1 °F (36.7 °C), Min:97.5 °F (36.4 °C), Max:98.5 °F (36.9 °C)     No changes from my previous exam     Lines:  Peripheral IV:       Physical Exam:    General:  Alert, cooperative, appears stated age   Eyes:  Sclera anicteric. Pupils equally round and reactive to light. Mouth/Throat: Mucous membranes normal, oral pharynx clear   Neck: Supple   Lungs:   Clear to auscultation bilaterally, good effort   CV:  Regular rate and rhythm,no murmur, click, rub or gallop   Abdomen:   Soft, non-tender.  bowel sounds normal. non-distended   Extremities: No cyanosis or edema   Skin: L chest PM pocket w 2x2cm indurated, nodule consistent with abscess    Lymph nodes: Cervical and supraclavicular normal   Musculoskeletal: No swelling or deformity   Lines/Devices:  Intact, no erythema, drainage or tenderness   Psych: Alert and oriented, normal mood affect given the setting       Data Review:     CBC:  Recent Labs     20  0325 20  0348 20  2226   WBC 11.0 13.3* 13.3*   GRANS 63 69 73 MONOS 7 8 7   EOS 4 3 3   ANEU 7.0 9.1* 9.7*   ABL 2.7 2.6 2.2   HGB 11.0* 11.4* 12.5*   HCT 33.6* 37.0* 40.0*    293 343       BMP:  Recent Labs     07/29/20  0325 07/28/20  0348 07/27/20  2226   CREA 1.54* 1.59* 1.70*   BUN 29* 32* 32*    140 136   K 3.8 3.9 4.3    104 102   CO2 29 31 30   AGAP 7 5* 4*   * 132* 211*       LFTS:  Recent Labs     07/27/20 2226   TBILI 0.4   ALT 34   AP 88   TP 7.9   ALB 2.8*       Microbiology:     All Micro Results     Procedure Component Value Units Date/Time    CULTURE, BLOOD [[id number]] Collected:  07/27/20 2320    Order Status:  Completed Specimen:  Blood Updated:  07/29/20 0810     Special Requests: --        RIGHT  HAND       Culture result: NO GROWTH 2 DAYS       CULTURE, BLOOD [[id number]] Collected:  07/27/20 2321    Order Status:  Completed Specimen:  Blood Updated:  07/29/20 0810     Special Requests: --        RIGHT  FOREARM       Culture result: NO GROWTH 2 DAYS             Imaging:   n/a    Signed By: Christina Manley NP     July 29, 2020

## 2020-07-29 NOTE — PROCEDURES
OPERATORS: Rola Dickinson. Rupal Driver MD    REFERRING: Carissa Acuña MD      PRE-PROCEDURE DIAGNOSIS:  1. Documented non-reversible symptomatic bradycardia due to heart block. 2. Pacemaker pocket infection. PROCEDURES PERFORMED: [employer]onic MICRA Leadless Pacemaker Implantation    INDICATION: Patient has a pacemaker pocket infection and needs an alternative pacing option given underlying complete heart block. CHARGE: This procedure is similar in nature, scope, and procedure time to a single chamber pacemaker. We elected to pursue a leadless system due to the above listed concerns in the indication. Anesthesia: MAC     Estimated Blood Loss: Less than 10 mL     Specimens: * No specimens in log *     Fluoroscopy Time: 4.5 minutes     Complications: None      PATIENT INFORMATION AND INDICATIONS: The procedure, indications, risks, benefits, and alternatives were discussed with the patient and family members, who desired to proceed after questions were answered and informed consent was documented. PROCEDURAL DESCRIPTION:  The patient was brought to the EP lab in a fasting, non-sedated state after informed consent was obtained. The right groin was then prepped and draped in the usual sterile fashion. Under sterile conditions, 1% Lidocaine was used for local anesthesia to the right femoral groin area. Access was obtained in the right femoral vein using the modified Seldinger technique under ultrasound guidance with placement of an 8 Fr sheath. An 0.035 Amplatz extra-stiff wire was then attempted to be advanced into the SVC, however, there was significant stenosis and tortuosity. A venogram was performed and demonstrated probable dense clot formation/stenosis/scarring. Multiple attempts were made to advance the wire but it was not moving freely. Given the procedure calls for a large 27 Fr sheath, it was decided to conclude the procedure out of concern for a possible vascular injury if we were to continue. The patient tolerated the procedure and no complications were noted. He was transferred to the recovery area in stable condition. CONCLUSIONS: Challenging venous anatomy, unsuccessful attempt at NORCAP LODGE implant. PLAN:   -Ongoing hospital care. -Device infection per Dr. Felisa Ventura.   -Continue antibiotics. Radha Manuel.  Gilles Rinaldi MD, [address]  Clinical Cardiac Electrophysiology  [address] Cardiology    7/29/2020  7:26 PM

## 2020-07-29 NOTE — PROGRESS NOTES
Hospitalist Note     Admit Date:  2020 10:11 PM   Name:  Candance Maris   Age:  80 y.o.  :  1935   MRN:  [id number]   PCP:  Paula Moore MD  Treatment Team: Attending Provider: Anjum Haywood MD; Consulting Provider: Mile Fuentes MD; Consulting Provider: Fer Hernandez MD; Care Manager: yS Burkett, RN; Primary Nurse: Casandra Tyler; Utilization Review: Afia Tamez    HPI/Subjective:   Mr. Geovanna Mckeon is a nice 79 y/o WM with a h/o atrial flutter, CHF s/p BiV PPM in 2019 who presented with progressive redness over his PPM site for a few days. WBCs mildly elevated at 13K, afebrile, labs otherwise normal. Admitted to hospitalist service. : In bed, pleasant. WBCs normal, afebrile. Redness to left upper chest appears progressed. No other complaints  Objective:     Patient Vitals for the past 24 hrs:   Temp Pulse Resp BP SpO2   20 0859 98.5 °F (36.9 °C) 69 18 158/61 96 %   20 0442 97.8 °F (36.6 °C) 67 18 142/72 97 %   20 0038 98.3 °F (36.8 °C) 67 18 142/70 95 %   20 2104 97.5 °F (36.4 °C) 72 18 127/63 93 %   20 1649 98 °F (36.7 °C) 67 18 146/71 97 %   20 1320 98.2 °F (36.8 °C) 71 18 129/54 95 %     Oxygen Therapy  O2 Sat (%): 96 % (20 0859)  O2 Device: Room air (20)    Estimated body mass index is 29.46 kg/m² as calculated from the following:    Height as of this encounter: 6' 1\" (1.854 m). Weight as of this encounter: 101.3 kg (223 lb 5.2 oz). Intake/Output Summary (Last 24 hours) at 2020 0956  Last data filed at 2020 0602  Gross per 24 hour   Intake 2408.75 ml   Output    Net 2408.75 ml       *Note that automatically entered I/Os may not be accurate; dependent on patient compliance with collection and accurate  by techs. General:    Well nourished. Alert. Pleasant. CV:   RRR. No murmur, rub, or gallop. Lungs:   CTAB. No wheezing, rhonchi, or rales.   Abdomen:   Soft, nontender, nondistended. Extremities: Warm and dry. No cyanosis or edema. Skin:     No rashes or jaundice. Erythema overlying pacemaker pocket below left clavicle. Area of fluctuance noted a few cm below the incision. Neuro:  No gross focal deficits    Data Reviewed:  I have reviewed all labs, meds, and studies from the last 24 hours:  Recent Results (from the past 24 hour(s))   METABOLIC PANEL, BASIC    Collection Time: 07/29/20  3:25 AM   Result Value Ref Range    Sodium 143 136 - 145 mmol/L    Potassium 3.8 3.5 - 5.1 mmol/L    Chloride 107 98 - 107 mmol/L    CO2 29 21 - 32 mmol/L    Anion gap 7 7 - 16 mmol/L    Glucose 102 (H) 65 - 100 mg/dL    BUN 29 (H) 8 - 23 MG/DL    Creatinine 1.54 (H) 0.8 - 1.5 MG/DL    GFR est AA 55 (L) >60 ml/min/1.73m2    GFR est non-AA 46 (L) >60 ml/min/1.73m2    Calcium 8.2 (L) 8.3 - 10.4 MG/DL   CBC WITH AUTOMATED DIFF    Collection Time: 07/29/20  3:25 AM   Result Value Ref Range    WBC 11.0 4.3 - 11.1 K/uL    RBC 4.09 (L) 4.23 - 5.6 M/uL    HGB 11.0 (L) 13.6 - 17.2 g/dL    HCT 33.6 (L) 41.1 - 50.3 %    MCV 82.2 79.6 - 97.8 FL    MCH 26.9 26.1 - 32.9 PG    MCHC 32.7 31.4 - 35.0 g/dL    RDW 15.0 (H) 11.9 - 14.6 %    PLATELET 244 809 - 078 K/uL    MPV 10.6 9.4 - 12.3 FL    ABSOLUTE NRBC 0.00 0.0 - 0.2 K/uL    DF AUTOMATED      NEUTROPHILS 63 43 - 78 %    LYMPHOCYTES 25 13 - 44 %    MONOCYTES 7 4.0 - 12.0 %    EOSINOPHILS 4 0.5 - 7.8 %    BASOPHILS 1 0.0 - 2.0 %    IMMATURE GRANULOCYTES 1 0.0 - 5.0 %    ABS. NEUTROPHILS 7.0 1.7 - 8.2 K/UL    ABS. LYMPHOCYTES 2.7 0.5 - 4.6 K/UL    ABS. MONOCYTES 0.8 0.1 - 1.3 K/UL    ABS. EOSINOPHILS 0.5 0.0 - 0.8 K/UL    ABS. BASOPHILS 0.1 0.0 - 0.2 K/UL    ABS. IMM.  GRANS. 0.1 0.0 - 0.5 K/UL        Current Meds:  Current Facility-Administered Medications   Medication Dose Route Frequency    neomycin-bacitracin-polymyxin (NEOSPORIN) 3.5-400-10,000 mg-unit-unit/g ophthalmic ointment   Both Eyes QID    Vancomycin Trough Reminder   Other ONCE    atorvastatin (LIPITOR) tablet 80 mg  80 mg Oral QHS    colchicine tablet 0.6 mg  0.6 mg Oral DAILY PRN    escitalopram oxalate (LEXAPRO) tablet 10 mg  10 mg Oral DAILY    hydroCHLOROthiazide (MICROZIDE) capsule 12.5 mg  12.5 mg Oral DAILY    levothyroxine (SYNTHROID) tablet 137 mcg  137 mcg Oral 6am    losartan (COZAAR) tablet 100 mg  100 mg Oral DAILY    magnesium oxide (MAG-OX) tablet 400 mg  400 mg Oral DAILY    pantoprazole (PROTONIX) tablet 40 mg  40 mg Oral ACB    sodium chloride (NS) flush 5-40 mL  5-40 mL IntraVENous Q8H    sodium chloride (NS) flush 5-40 mL  5-40 mL IntraVENous PRN    acetaminophen (TYLENOL) tablet 650 mg  650 mg Oral Q4H PRN    ondansetron (ZOFRAN) injection 4 mg  4 mg IntraVENous Q4H PRN    magnesium hydroxide (MILK OF MAGNESIA) 400 mg/5 mL oral suspension 30 mL  30 mL Oral DAILY PRN    0.9% sodium chloride infusion  75 mL/hr IntraVENous CONTINUOUS    vancomycin (VANCOCIN) 1250 mg in  ml infusion  1,250 mg IntraVENous Q24H    amLODIPine (NORVASC) tablet 2.5 mg  2.5 mg Oral DAILY    cefepime (MAXIPIME) 1 g in 0.9% sodium chloride (MBP/ADV) 50 mL  1 g IntraVENous Q12H       Other Studies:  Results for orders placed or performed during the hospital encounter of 20   2D ECHO COMPLETE ADULT (TTE) W OR WO CONTR    Narrative    [facility]  [facility] [address] [place] [address]  [phone]    Transthoracic Echocardiogram  2D, M-mode, Doppler, and Color Doppler    Patient: Mack Wen  MR #: [id number]  : [id number]  Age: 80 years  Gender: Male  Study date: 2020  Account #: [de-identified]  Height: 73 in  Weight: 224.6 lb  BSA: 2.26 mï¾²  Status:Routine  Location: 305  BP: 142/ 71    Allergies: OTHER MEDICATION    Sonographer:  Juliocesar Good RDCS  Group:  [facility] Cardiology  Referring Physician:  Las[address]  Reading Physician:  Sumit Benavides. Jose Hinojosa MD [facility]    INDICATIONS: PPM pocket infection.     *Patient scanned supine. *    PROCEDURE: This was a routine study. A transthoracic echocardiogram was  performed. The study included complete 2D imaging, M-mode, complete spectral  Doppler, and color Doppler. Image quality was adequate. LEFT VENTRICLE: Size was normal. Systolic function was normal. Ejection  fraction was estimated in the range of 55 % to 60 %. There were no regional  wall motion abnormalities. Wall thickness was normal. Avg. E/e': 10.5. Left  ventricular diastolic function parameters were normal.    RIGHT VENTRICLE: The size was normal. Systolic function was normal. A line  (catheter or pacing wire) was present. Estimated peak pressure was in the   range  of 30-35 mmHg. LEFT ATRIUM: Size was normal.    RIGHT ATRIUM: Size was normal. A line (catheter or pacing wire) was present. SYSTEMIC VEINS: IVC: The inferior vena cava was normal in size and course. The  respirophasic change in diameter was more than 50%. AORTIC VALVE: The valve was trileaflet. Leaflets exhibited mild sclerosis. There was no evidence for stenosis. There was no insufficiency. MITRAL VALVE: Valve structure was normal. There was no evidence for stenosis. There was trivial regurgitation. TRICUSPID VALVE: The valve structure was normal. There was no evidence for  stenosis. There was mild regurgitation. PULMONIC VALVE: Not well visualized. There was no evidence for stenosis. There  was trivial regurgitation. PERICARDIUM: There was no pericardial effusion. AORTA: The root exhibited normal size. SUMMARY:    -  Left ventricle: Systolic function was normal. Ejection fraction was  estimated in the range of 55 % to 60 %. There were no regional wall motion  abnormalities. -  Inferior vena cava, hepatic veins: The respirophasic change in diameter   was  more than 50%. -  Tricuspid valve: There was mild regurgitation.     SYSTEM MEASUREMENT TABLES    2D mode  AoR Diam (2D): 3.1 cm  LA Dimension (2D): 4.4 cm  Left Atrium Systolic Volume Index; Method of Disks, Biplane; 2D mode;: 31   ml/m2  IVS/LVPW (2D): 0.9  IVSd (2D): 1.3 cm  LVIDd (2D): 4.9 cm  LVIDs (2D): 3.6 cm  LVOT Area (2D): 4.9 cm2  LVPWd (2D): 1.4 cm  RVIDd (2D): 3.6 cm    Unspecified Scan Mode  Peak Grad; Mean; Antegrade Flow: 19 mm[Hg]  Vmax; Antegrade Flow: 198 cm/s  LVOT Diam: 2.5 cm    Prepared and signed by    Frank Simmons. Shari Peabody, MD [facility]  Signed 28-Jul-2020 15:23:32         Us Chest    Result Date: 7/28/2020  Ultrasound chest soft tissue CLINICAL INDICATION: Focal swelling, lump, erythema of left anterior upper chest adjacent pacemaker COMPARISON: Chest radiography today TECHNIQUE: Sonographic gray scale and Doppler evaluation of left chest wall area of symptoms FINDINGS: There is a complex lobulated 2.2 x 1.5 x 1.7 cm fluid collection within superficial subcutaneous soft tissues, corresponding to the area of symptoms. There is posterior acoustical enhancement, and there is mild increased color Doppler flow to the surrounding tissues. Abscess is most likely although a hematoma could have a similar appearance in the appropriate clinical setting. IMPRESSION: Complex fluid collection within left chest wall; abscess versus hematoma.       All Micro Results     Procedure Component Value Units Date/Time    CULTURE, BLOOD [[id number]] Collected:  07/27/20 2320    Order Status:  Completed Specimen:  Blood Updated:  07/29/20 0810     Special Requests: --        RIGHT  HAND       Culture result: NO GROWTH 2 DAYS       CULTURE, BLOOD [[id number]] Collected:  07/27/20 2321    Order Status:  Completed Specimen:  Blood Updated:  07/29/20 0810     Special Requests: --        RIGHT  FOREARM       Culture result: NO GROWTH 2 DAYS             SARS-CoV-2 Lab Results  \"Novel Coronavirus\" Test: No results found for: COV2NT   \"Emergent Disease\" Test: No results found for: EDPR  \"SARS-COV-2\" Test: No results found for: XGCOVT  \"Precision Labs\" Test: No results found for: RSLT  Rapid Test: No results found for: COVR         Assessment and Plan:     Hospital Problems as of 7/29/2020 Date Reviewed: 10/24/2019          Codes Class Noted - Resolved POA    Leukocytosis ICD-10-CM: D72.829  ICD-9-CM: 288.60  7/28/2020 - Present Yes        Normocytic anemia ICD-10-CM: D64.9  ICD-9-CM: 285.9  7/28/2020 - Present Yes        * (Principal) Cellulitis ICD-10-CM: L03.90  ICD-9-CM: 682.9  7/28/2020 - Present Yes        Chronic kidney disease (CKD), stage III (moderate) (HCC) ICD-10-CM: N18.3  ICD-9-CM: 585.3  9/19/2016 - Present Yes        Paroxysmal atrial fibrillation (HCC) ICD-10-CM: I48.0  ICD-9-CM: 427.31  6/18/2014 - Present Yes    Overview Addendum 12/13/2018  1:44 PM by Loc Mckinney MD     1. Atrial flutter Diagnosed 6/18/14: s/p ablation. 2. Echo (6/19/14): EF 45-50%. Moderate mitral regurgitation. 3. Ecardio (8/12-8/25/14): No atrial arrhythmias. 4.  Admitted with CVA (3/18): Atrial fibrillation noted. Eliquis started. History of CVA (cerebrovascular accident) ICD-10-CM: Z86.73  ICD-9-CM: V12.54  6/18/2014 - Present Yes    Overview Signed 9/29/2015 12:53 PM by Jose De Jesus Agustin     History of mini-strokes. Hypertension ICD-10-CM: I10  ICD-9-CM: 401.9  4/16/2013 - Present Yes        CAD (coronary artery disease) ICD-10-CM: I25.10  ICD-9-CM: 414.00  4/16/2013 - Present Yes    Overview Addendum 9/29/2015 12:49 PM by Jose De Jesus Agustin     1. CABG 2005: LIMA to LAD, SVG to diagonal, SVG to OM and SVG to RCA  2. NSTEMI (12/11/13): Peak troponin 4.66.   A. LHC: Severe 3 vessel CAD. Occluded SVG to PDA and diagonal. Stenosis in SVG to OM. B. PCI of SVG to OM: Distal body treated with 4 x 18 mm vision BMS. Prox body lesion treated with integrity 4 x 18 BMS. C. Echo: Normal LV systolic function. EF 55-60%. Mild mitral/tricuspid regurgitation. 3. Echo (9/23/14): EF 50-55%. Mild LAE. Mild mitral regurgitation.              Hypothyroidism ICD-10-CM: E03.9  ICD-9-CM: 244.9  4/16/2013 - Present Yes        Hyperlipidemia ICD-10-CM: E78.5  ICD-9-CM: 272.4  4/16/2013 - Present Yes        GERD (gastroesophageal reflux disease) ICD-10-CM: K21.9  ICD-9-CM: 530.81  4/16/2013 - Present Yes              Plan:  # Cellulitis              - Appears progressed, though afebrile and WBCs now normalized. Plans noted for Micra implantation then PPM explanation. Blood cxs negative to date. Vanc/cefepime. Appreciate ID recs. # H/o CHB/flutter   - S/p BiV PPM in 2019    # Hypothyroidism              - Synthroid     # HTN              - Home meds     # CAD              - Statin.     # MDD              - Lexapro     # GERD              - PPI    DC planning/Dispo: Home to resume HH. Diet:  DIET NPO  DVT ppx: SCDs.     Signed:  Genaro Key MD

## 2020-07-29 NOTE — PROGRESS NOTES
Problem: Patient Education: Go to Patient Education Activity  Goal: Patient/Family Education  Outcome: Resolved/Met     Problem: Pacer/ICD: Pre-Procedure  Goal: Off Pathway (Use only if patient is Off Pathway)  Outcome: Resolved/Met  Goal: Activity/Safety  Outcome: Resolved/Met  Goal: Consults, if ordered  Outcome: Resolved/Met  Goal: Diagnostic Test/Procedures  Outcome: Resolved/Met  Goal: Nutrition/Diet  Outcome: Resolved/Met  Goal: Discharge Planning  Outcome: Resolved/Met  Goal: Medications  Outcome: Resolved/Met  Goal: Respiratory  Outcome: Resolved/Met  Goal: Treatments/Interventions/Procedures  Outcome: Resolved/Met  Goal: Psychosocial  Outcome: Resolved/Met  Goal: *Verbalize description of procedure  Outcome: Resolved/Met  Goal: *Consent signed  Outcome: Resolved/Met

## 2020-07-29 NOTE — ANESTHESIA PREPROCEDURE EVALUATION
Anesthetic History   No history of anesthetic complications            Review of Systems / Medical History  Patient summary reviewed and pertinent labs reviewed    Pulmonary            Asthma (Childhood)        Neuro/Psych       CVA (2014): no residual symptoms       Cardiovascular    Hypertension        Dysrhythmias (High degree AV block) : atrial fibrillation and atrial flutter  Pacemaker (pacer for CHB), past MI (2013), CAD (s/p CABG 2005, NADIA 2013), PAD, cardiac stents (2013), CABG (2005) and hyperlipidemia    Exercise tolerance: <4 METS  Comments: Unremarkable echo 7/20. GI/Hepatic/Renal     GERD: well controlled    Renal disease: CRI       Endo/Other      Hypothyroidism: well controlled  Arthritis and anemia     Other Findings            Physical Exam    Airway  Mallampati: III  TM Distance: 4 - 6 cm  Neck ROM: decreased range of motion   Mouth opening: Normal     Cardiovascular    Rhythm: irregular  Rate: abnormal         Dental    Dentition: Implants and Lower partial plate  Comments: Full implants on top   Pulmonary  Breath sounds clear to auscultation               Abdominal         Other Findings            Anesthetic Plan    ASA: 4  Anesthesia type: total IV anesthesia          Induction: Intravenous  Anesthetic plan and risks discussed with: Patient and Spouse      Infected pacemaker.

## 2020-07-30 ENCOUNTER — ANESTHESIA EVENT (OUTPATIENT)
Dept: SURGERY | Age: 85
DRG: 243 | End: 2020-07-30
Payer: MEDICARE

## 2020-07-30 ENCOUNTER — APPOINTMENT (OUTPATIENT)
Dept: GENERAL RADIOLOGY | Age: 85
DRG: 243 | End: 2020-07-30
Attending: INTERNAL MEDICINE
Payer: MEDICARE

## 2020-07-30 ENCOUNTER — ANESTHESIA (OUTPATIENT)
Dept: SURGERY | Age: 85
DRG: 243 | End: 2020-07-30
Payer: MEDICARE

## 2020-07-30 LAB
ATRIAL RATE: 72 BPM
BASOPHILS # BLD: 0.1 K/UL (ref 0–0.2)
BASOPHILS NFR BLD: 1 % (ref 0–2)
CALCULATED R AXIS, ECG10: -88 DEGREES
CALCULATED T AXIS, ECG11: 89 DEGREES
DIAGNOSIS, 93000: NORMAL
DIFFERENTIAL METHOD BLD: ABNORMAL
EOSINOPHIL # BLD: 0.6 K/UL (ref 0–0.8)
EOSINOPHIL NFR BLD: 5 % (ref 0.5–7.8)
ERYTHROCYTE [DISTWIDTH] IN BLOOD BY AUTOMATED COUNT: 15 % (ref 11.9–14.6)
HCT VFR BLD AUTO: 34.4 % (ref 41.1–50.3)
HGB BLD-MCNC: 11.1 G/DL (ref 13.6–17.2)
IMM GRANULOCYTES # BLD AUTO: 0.1 K/UL (ref 0–0.5)
IMM GRANULOCYTES NFR BLD AUTO: 1 % (ref 0–5)
LYMPHOCYTES # BLD: 2.4 K/UL (ref 0.5–4.6)
LYMPHOCYTES NFR BLD: 22 % (ref 13–44)
MCH RBC QN AUTO: 26.7 PG (ref 26.1–32.9)
MCHC RBC AUTO-ENTMCNC: 32.3 G/DL (ref 31.4–35)
MCV RBC AUTO: 82.7 FL (ref 79.6–97.8)
MONOCYTES # BLD: 0.8 K/UL (ref 0.1–1.3)
MONOCYTES NFR BLD: 7 % (ref 4–12)
NEUTS SEG # BLD: 7.1 K/UL (ref 1.7–8.2)
NEUTS SEG NFR BLD: 65 % (ref 43–78)
NRBC # BLD: 0 K/UL (ref 0–0.2)
PLATELET # BLD AUTO: 299 K/UL (ref 150–450)
PMV BLD AUTO: 10.7 FL (ref 9.4–12.3)
Q-T INTERVAL, ECG07: 516 MS
QRS DURATION, ECG06: 202 MS
QTC CALCULATION (BEZET), ECG08: 560 MS
RBC # BLD AUTO: 4.16 M/UL (ref 4.23–5.6)
VENTRICULAR RATE, ECG03: 71 BPM
WBC # BLD AUTO: 11 K/UL (ref 4.3–11.1)

## 2020-07-30 PROCEDURE — 65660000000 HC RM CCU STEPDOWN

## 2020-07-30 PROCEDURE — 33207 INSERT HEART PM VENTRICULAR: CPT

## 2020-07-30 PROCEDURE — 77030031139 HC SUT VCRL2 J&J -A

## 2020-07-30 PROCEDURE — 77030008467 HC STPLR SKN COVD -B

## 2020-07-30 PROCEDURE — 76060000034 HC ANESTHESIA 1.5 TO 2 HR: Performed by: INTERNAL MEDICINE

## 2020-07-30 PROCEDURE — 87635 SARS-COV-2 COVID-19 AMP PRB: CPT

## 2020-07-30 PROCEDURE — 74011000250 HC RX REV CODE- 250: Performed by: INTERNAL MEDICINE

## 2020-07-30 PROCEDURE — 74011250637 HC RX REV CODE- 250/637: Performed by: INTERNAL MEDICINE

## 2020-07-30 PROCEDURE — 02PA3MZ REMOVAL OF CARDIAC LEAD FROM HEART, PERCUTANEOUS APPROACH: ICD-10-PCS | Performed by: INTERNAL MEDICINE

## 2020-07-30 PROCEDURE — 02HK3JZ INSERTION OF PACEMAKER LEAD INTO RIGHT VENTRICLE, PERCUTANEOUS APPROACH: ICD-10-PCS | Performed by: INTERNAL MEDICINE

## 2020-07-30 PROCEDURE — C1893 INTRO/SHEATH, FIXED,NON-PEEL: HCPCS

## 2020-07-30 PROCEDURE — 33234 REMOVAL OF PACEMAKER SYSTEM: CPT

## 2020-07-30 PROCEDURE — 0JPT0PZ REMOVAL OF CARDIAC RHYTHM RELATED DEVICE FROM TRUNK SUBCUTANEOUS TISSUE AND FASCIA, OPEN APPROACH: ICD-10-PCS | Performed by: INTERNAL MEDICINE

## 2020-07-30 PROCEDURE — 74011250636 HC RX REV CODE- 250/636: Performed by: INTERNAL MEDICINE

## 2020-07-30 PROCEDURE — C1898 LEAD, PMKR, OTHER THAN TRANS: HCPCS

## 2020-07-30 PROCEDURE — 85025 COMPLETE CBC W/AUTO DIFF WBC: CPT

## 2020-07-30 PROCEDURE — A4565 SLINGS: HCPCS

## 2020-07-30 PROCEDURE — 77030013687 HC GD NDL BARD -B

## 2020-07-30 PROCEDURE — 74011000258 HC RX REV CODE- 258: Performed by: INTERNAL MEDICINE

## 2020-07-30 PROCEDURE — 74011000272 HC RX REV CODE- 272: Performed by: INTERNAL MEDICINE

## 2020-07-30 PROCEDURE — 93005 ELECTROCARDIOGRAM TRACING: CPT | Performed by: INTERNAL MEDICINE

## 2020-07-30 PROCEDURE — 74011250636 HC RX REV CODE- 250/636: Performed by: NURSE ANESTHETIST, CERTIFIED REGISTERED

## 2020-07-30 PROCEDURE — 0JH604Z INSERTION OF PACEMAKER, SINGLE CHAMBER INTO CHEST SUBCUTANEOUS TISSUE AND FASCIA, OPEN APPROACH: ICD-10-PCS | Performed by: INTERNAL MEDICINE

## 2020-07-30 PROCEDURE — 77030041279 HC DRSG PRMSL AG MDII -B

## 2020-07-30 PROCEDURE — 77030022704 HC SUT VLOC COVD -B

## 2020-07-30 PROCEDURE — 77030002912 HC SUT ETHBND J&J -A

## 2020-07-30 PROCEDURE — 71045 X-RAY EXAM CHEST 1 VIEW: CPT

## 2020-07-30 PROCEDURE — 36415 COLL VENOUS BLD VENIPUNCTURE: CPT

## 2020-07-30 PROCEDURE — 74011000250 HC RX REV CODE- 250: Performed by: NURSE ANESTHETIST, CERTIFIED REGISTERED

## 2020-07-30 PROCEDURE — C1786 PMKR, SINGLE, RATE-RESP: HCPCS

## 2020-07-30 RX ORDER — SODIUM CHLORIDE 0.9 % (FLUSH) 0.9 %
5-40 SYRINGE (ML) INJECTION AS NEEDED
Status: DISCONTINUED | OUTPATIENT
Start: 2020-07-30 | End: 2020-07-31 | Stop reason: HOSPADM

## 2020-07-30 RX ORDER — HYDROCODONE BITARTRATE AND ACETAMINOPHEN 5; 325 MG/1; MG/1
1 TABLET ORAL
Status: DISCONTINUED | OUTPATIENT
Start: 2020-07-30 | End: 2020-07-31 | Stop reason: HOSPADM

## 2020-07-30 RX ORDER — PROPOFOL 10 MG/ML
INJECTION, EMULSION INTRAVENOUS
Status: DISCONTINUED | OUTPATIENT
Start: 2020-07-30 | End: 2020-07-30 | Stop reason: HOSPADM

## 2020-07-30 RX ORDER — SODIUM CHLORIDE 0.9 % (FLUSH) 0.9 %
5-40 SYRINGE (ML) INJECTION EVERY 8 HOURS
Status: DISCONTINUED | OUTPATIENT
Start: 2020-07-30 | End: 2020-07-31 | Stop reason: HOSPADM

## 2020-07-30 RX ORDER — LIDOCAINE HYDROCHLORIDE 20 MG/ML
INJECTION, SOLUTION EPIDURAL; INFILTRATION; INTRACAUDAL; PERINEURAL AS NEEDED
Status: DISCONTINUED | OUTPATIENT
Start: 2020-07-30 | End: 2020-07-30 | Stop reason: HOSPADM

## 2020-07-30 RX ORDER — CEFAZOLIN SODIUM/WATER 2 G/20 ML
2 SYRINGE (ML) INTRAVENOUS ONCE
Status: COMPLETED | OUTPATIENT
Start: 2020-07-30 | End: 2020-07-30

## 2020-07-30 RX ORDER — PROPOFOL 10 MG/ML
INJECTION, EMULSION INTRAVENOUS AS NEEDED
Status: DISCONTINUED | OUTPATIENT
Start: 2020-07-30 | End: 2020-07-30 | Stop reason: HOSPADM

## 2020-07-30 RX ADMIN — LIDOCAINE HYDROCHLORIDE 40 MG: 20 INJECTION, SOLUTION EPIDURAL; INFILTRATION; INTRACAUDAL; PERINEURAL at 15:28

## 2020-07-30 RX ADMIN — NEOMYCIN SULFATE, POLYMYXIN B SULFATE AND BACITRACIN ZINC: 3.5; 10000; 4 OINTMENT OPHTHALMIC at 12:27

## 2020-07-30 RX ADMIN — HYDROCODONE BITARTRATE AND ACETAMINOPHEN 1 TABLET: 5; 325 TABLET ORAL at 17:10

## 2020-07-30 RX ADMIN — PROPOFOL 50 MCG/KG/MIN: 10 INJECTION, EMULSION INTRAVENOUS at 15:28

## 2020-07-30 RX ADMIN — PANTOPRAZOLE SODIUM 40 MG: 40 TABLET, DELAYED RELEASE ORAL at 07:31

## 2020-07-30 RX ADMIN — Medication 10 ML: at 12:27

## 2020-07-30 RX ADMIN — NEOMYCIN SULFATE, POLYMYXIN B SULFATE AND BACITRACIN ZINC: 3.5; 10000; 4 OINTMENT OPHTHALMIC at 09:05

## 2020-07-30 RX ADMIN — LOSARTAN POTASSIUM 100 MG: 50 TABLET, FILM COATED ORAL at 09:02

## 2020-07-30 RX ADMIN — NEOMYCIN SULFATE, POLYMYXIN B SULFATE AND BACITRACIN ZINC: 3.5; 10000; 4 OINTMENT OPHTHALMIC at 18:08

## 2020-07-30 RX ADMIN — AMLODIPINE BESYLATE 2.5 MG: 5 TABLET ORAL at 09:03

## 2020-07-30 RX ADMIN — ESCITALOPRAM OXALATE 10 MG: 10 TABLET ORAL at 09:12

## 2020-07-30 RX ADMIN — CEFEPIME HYDROCHLORIDE 1 G: 1 INJECTION, POWDER, FOR SOLUTION INTRAMUSCULAR; INTRAVENOUS at 18:08

## 2020-07-30 RX ADMIN — NEOMYCIN AND POLYMYXIN B SULFATES: 40; 200000 SOLUTION IRRIGATION at 16:34

## 2020-07-30 RX ADMIN — Medication 5 ML: at 22:00

## 2020-07-30 RX ADMIN — LIDOCAINE HYDROCHLORIDE 200 MG: 10; .005 INJECTION, SOLUTION EPIDURAL; INFILTRATION; INTRACAUDAL; PERINEURAL at 15:50

## 2020-07-30 RX ADMIN — VANCOMYCIN HYDROCHLORIDE 1000 MG: 1 INJECTION, POWDER, LYOPHILIZED, FOR SOLUTION INTRAVENOUS at 23:01

## 2020-07-30 RX ADMIN — MAGNESIUM GLUCONATE 500 MG ORAL TABLET 400 MG: 500 TABLET ORAL at 09:03

## 2020-07-30 RX ADMIN — Medication 5 ML: at 06:21

## 2020-07-30 RX ADMIN — ATORVASTATIN CALCIUM 80 MG: 80 TABLET, FILM COATED ORAL at 22:00

## 2020-07-30 RX ADMIN — NEOMYCIN SULFATE, POLYMYXIN B SULFATE AND BACITRACIN ZINC: 3.5; 10000; 4 OINTMENT OPHTHALMIC at 22:00

## 2020-07-30 RX ADMIN — HYDROCHLOROTHIAZIDE 12.5 MG: 12.5 CAPSULE ORAL at 09:02

## 2020-07-30 RX ADMIN — PROPOFOL 50 MG: 10 INJECTION, EMULSION INTRAVENOUS at 15:28

## 2020-07-30 RX ADMIN — CEFAZOLIN SODIUM 2 G: 100 INJECTION, POWDER, LYOPHILIZED, FOR SOLUTION INTRAVENOUS at 15:34

## 2020-07-30 RX ADMIN — LEVOTHYROXINE SODIUM 137 MCG: 0.11 TABLET ORAL at 06:19

## 2020-07-30 RX ADMIN — CEFEPIME HYDROCHLORIDE 1 G: 1 INJECTION, POWDER, FOR SOLUTION INTRAMUSCULAR; INTRAVENOUS at 07:31

## 2020-07-30 NOTE — PROGRESS NOTES
Spiritual Care Visit, initial visit. Attempted to visit with patient. Patient was off the floor. Visit by Patric Ball, Staff .  Radha., Sean.LANDON., B.A.

## 2020-07-30 NOTE — PROCEDURES
Pre-Procedure Diagnosis  1. Documented non-reversible symptomatic bradycardia due to third degree atrioventricular block. 2.  Possible PPM pocket infection     Procedure Performed  1. Insertion of a single chamber PPM  2. Removal of biventricular PPM generator  3. Extraction of RV pacing lead  4. Extraction of RA pacing lead  5. Extraction of LV pacing lead    Anesthesia: MAC     Estimated Blood Loss: Less than 10 mL     Specimens: * No specimens in log *     Patient Information and Indications: The procedure, indications, risks, benefits, and alternatives were discussed with the patient and family members, who desired to proceed after questions were answered and informed consent was documented. Procedure: After informed consent was obtained, the patient was brought to the Electrophysiology Laboratory in a fasting state and was prepped and draped in sterile fashion. Prophylactic antibiotic was administered 10 minutes prior to skin incision: see anesthesia procedure documentation for details. Conscious sedation was administered by anesthesia with continuous oxygen saturation measurement and blood pressure measurement. Local anesthetic (sensorcaine) was delivered to the right pectoral region and an incision was made parallel to the deltopectoral groove. A subcutaneous pocket was created using blunt dissection and electrocautery, and adequate hemostasis was established. Access x 1 was obtained under ultrasound guidance using a modified Seldinger technique with placement of a short J tipped wire down into the RA and advanced below the diaphragm. Next, placement of a 6Fr peel-away sheath over the guidewire was performed. A permanent RV pm lead was then advanced under fluoroscopic guidance via the 6Fr sheath into the RV in a stable position with satisfactory sensing and pacing characteristics. The peel away sheath was removed.   The lead was sutured to the underlying pectoralis fascia using 2 non-absorbable sutures around each the lead collar. The lead slack and position was assessed under fluoroscopy while securing the lead collars to ensure proper length. Final lead testing via the pacing system analyzer (PSA) demonstrated stable sensing, impedance and pacing thresholds. The lead pin was then cleaned with antibiotic soaked gauze, dried gently, and attached to a new pacemaker generator. The pin was directly observed to pass the tip electrode, and the ring hex wrench screws were secured, and leads tug tested. The device and leads were gently positioned within the pocket after the pocket was irrigated copiously with a saline antimicrobial solution. The wound was closed with multiple layers of absorbable suture followed by skin closure with staples. Fluoroscopic images were interpreted by me, in multiple projections. Final device position was confirmed by stored fluoroscopic image from device pocket to lead tip in AP projection. Biventricular PPM System Extraction  The left side of the chest was prepped and draped. An incision was made over the old scar on the left side of the chest parallel to the deltopectoral groove. The pocket was opened using electrocautery and blunt dissection. The leads and generator were freed from fibrous tissue and removed from the pocket. The lead sleeves were removed and leads were disconnected from the generator and the biventricular pacemaker generator was removed. A stylet was placed down the LV lead and the lead was removed with gentle traction. A stylet was placed down the RA lead, the lead screw was retracted, and the lead was removed using gentle traction. A stylet was placed down the RV lead, lead screw retracted, and the lead was removed using gentle traction. The pocket was irrigated copiously with a saline antimicrobial solution. The wound was closed with multiple layers of absorbable suture followed by skin closure with staples.      Complications: None     Lead Data:    Pulse Generator Model #  Serial # Location Implant/Explant   [id number] [id number] Left Pectoral Implant   [id number] Biotronik [id number] Left Pectoral Explant     Lead Model Number  Serial Number Lead position Implant/Explant   RA [id number] Biotronik [id number] RA Appendage Explant   RV [id number] Biotronik [id number] RV Moselle Implant   RV [id number] Biotronik [id number] RV Moselle Explant   LV [id number] Biotronik [id number] LV Lateral Explant     Lead Sensitivity and Threshold  Lead R or P sensitivity (mv) Threshold (V) Threshold PW (msec) Impedance (ohms) Final output Voltage (V) Final PW (msec)   RV -- 0.7 0.4 780 4.8 0.4     Bradycardia Settings  Andrea Mode LRL URL Pace AVD (ms) Sense AVD (ms) Rate Response Mode Switching Mode SW Rate   VVI-CLS 70 -- -- -- On Off --     No Contrast    Fluoro Time: 2.0 min    Impression: 1. Successful implantation and testing of a single chamber Pacemaker. 2. Successful extraction of a biventricular pacemaker generator followed by successful extraction of an LV pacing lead, RA pacing lead and RV pacing lead. Steven Brenda Toscano MD, MS  Clinical Cardiac Electrophysiology  7/30/2020  4:46 PM

## 2020-07-30 NOTE — ANESTHESIA POSTPROCEDURE EVALUATION
Procedure(s):  PACEMAKER INSERTION. total IV anesthesia    Anesthesia Post Evaluation      Multimodal analgesia: multimodal analgesia used between 6 hours prior to anesthesia start to PACU discharge  Patient location during evaluation: bedside  Patient participation: complete - patient participated  Level of consciousness: responsive to verbal stimuli  Pain management: adequate  Airway patency: patent  Anesthetic complications: no  Cardiovascular status: acceptable  Respiratory status: acceptable, spontaneous ventilation and nonlabored ventilation  Hydration status: acceptable  Post anesthesia nausea and vomiting:  none      INITIAL Post-op Vital signs:   Vitals Value Taken Time   /56 7/29/2020  7:58 PM   Temp     Pulse 68 7/29/2020  8:00 PM   Resp     SpO2 95 % 7/29/2020  8:00 PM   Vitals shown include unvalidated device data.

## 2020-07-30 NOTE — ROUTINE PROCESS
Verbal bedside report given to Veronica Huston, oncoming RN. Patient's situation, background, assessment and recommendations provided. Opportunity for questions provided. Oncoming RN assumed care of patient. Right pace maker site and left pace maker removal site visualized.

## 2020-07-30 NOTE — ANESTHESIA PREPROCEDURE EVALUATION
Anesthetic History   No history of anesthetic complications            Review of Systems / Medical History  Patient summary reviewed and pertinent labs reviewed    Pulmonary            Asthma (Childhood)        Neuro/Psych       CVA  TIA    Comments: Multiple strokes and subsequent weakness and occasional aphasia    Cardiovascular    Hypertension        Dysrhythmias (High degree AV block) : atrial fibrillation and atrial flutter  Pacemaker (pacer for CHB), past MI (2013), CAD (s/p CABG 2005, NADIA 2013), PAD, cardiac stents (2013), CABG (2005) and hyperlipidemia    Exercise tolerance: <4 METS  Comments: Unremarkable echo 7/20.    GI/Hepatic/Renal     GERD: well controlled    Renal disease: CRI       Endo/Other      Hypothyroidism: well controlled  Arthritis and anemia     Other Findings            Physical Exam    Airway  Mallampati: III  TM Distance: 4 - 6 cm  Neck ROM: decreased range of motion   Mouth opening: Normal     Cardiovascular    Rhythm: irregular  Rate: abnormal         Dental    Dentition: Implants and Lower partial plate  Comments: Full implants on top   Pulmonary  Breath sounds clear to auscultation               Abdominal  GI exam deferred       Other Findings            Anesthetic Plan    ASA: 4  Anesthesia type: total IV anesthesia          Induction: Intravenous  Anesthetic plan and risks discussed with: Patient and Spouse

## 2020-07-30 NOTE — PROGRESS NOTES
TRANSFER - IN REPORT:    Verbal report received from ALDAIR Cano(name) on Diana Brooks  being received from Cath Lab(unit) for routine progression of care      Report consisted of patients Situation, Background, Assessment and   Recommendations(SBAR). Information from the following report(s) SBAR, Kardex, Procedure Summary, Intake/Output and MAR was reviewed with the receiving nurse. Opportunity for questions and clarification was provided. Assessment completed upon patients arrival to unit and care assumed.

## 2020-07-30 NOTE — ROUTINE PROCESS
Verbal bedside report received from [place], [facility]. Assumed care of patient.

## 2020-07-30 NOTE — PROGRESS NOTES
TRANSFER - OUT REPORT:    Verbal report given to [employer] (name) on Juliette Sue  being returned to room 305 (unit) for routine progression of care       Report consisted of patients Situation, Background, Assessment and   Recommendations(SBAR). Information from the following report(s) Procedure Summary was reviewed with the receiving nurse. Lines:   Peripheral IV 07/27/20 Right Hand (Active)   Site Assessment Clean, dry, & intact 07/29/20 1206   Phlebitis Assessment 0 07/29/20 1206   Infiltration Assessment 0 07/29/20 1206   Dressing Status Clean, dry, & intact 07/29/20 1206   Dressing Type Transparent;Tape 07/29/20 1206   Hub Color/Line Status Infusing;Patent 07/29/20 1206   Action Taken Blood drawn 07/27/20 2223   Alcohol Cap Used No 07/29/20 1206        Opportunity for questions and clarification was provided.       Patient transported with:  Hospital transport

## 2020-07-30 NOTE — ROUTINE PROCESS
Bedside and Verbal shift change report to be given to self (oncoming nurse) by Colen Goltz, RN (offgoing nurse). Report included the following information SBAR, Kardex, Procedure Summary, Intake/Output and MAR.

## 2020-07-30 NOTE — PROGRESS NOTES
[facility] CARDIOLOGY PROGRESS NOTE           7/30/2020 11:30 AM    Admit Date: 7/27/2020    Admit Diagnosis: Cellulitis [L03.90]      Subjective:   No complaints this AM, no chest pain or shortness of breath    Interval History: (History of pertinent interval events obtained from nursing staff)    ROS:  GEN:  No fever or chills  Cardiovascular:  As noted above  Pulmonary:  As noted above  Neuro:  No new focal motor or sensory loss      Objective:     Vitals:    07/29/20 2046 07/30/20 0046 07/30/20 0419 07/30/20 0851   BP: 147/78 133/60 168/85 150/78   Pulse: 70 70 68 68   Resp: 18 18 18 18   Temp: 97.4 °F (36.3 °C) 97.6 °F (36.4 °C) 98.1 °F (36.7 °C) 98.1 °F (36.7 °C)   SpO2: 96% 97% 98% 96%   Weight:   229 lb 4.5 oz (104 kg)    Height:           Physical Exam:  General- No Acute Distress, Alert & Oriented x 3, appropriate mood. Neck- supple, no JVD  CV- regular rate and paced rhythm no MRG, erythematous and indurated left subclavian pocket  Lung- clear bilaterally  Abd- soft, nontender, nondistended  Ext- no edema bilaterally.   Skin- warm and dry    Current Facility-Administered Medications   Medication Dose Route Frequency    vancomycin (VANCOCIN) 1,000 mg in 0.9% sodium chloride (MBP/ADV) 250 mL  1,000 mg IntraVENous Q24H    neomycin-bacitracin-polymyxin (NEOSPORIN) 3.5-400-10,000 mg-unit-unit/g ophthalmic ointment   Both Eyes QID    heparin (PF) 2 units/ml in NS infusion 2,000 Units  1,000 mL Irrigation PRN    heparin (PF) 2 units/ml in NS infusion 4,000 Units  2,000 mL Irrigation PRN    atorvastatin (LIPITOR) tablet 80 mg  80 mg Oral QHS    colchicine tablet 0.6 mg  0.6 mg Oral DAILY PRN    escitalopram oxalate (LEXAPRO) tablet 10 mg  10 mg Oral DAILY    hydroCHLOROthiazide (MICROZIDE) capsule 12.5 mg  12.5 mg Oral DAILY    levothyroxine (SYNTHROID) tablet 137 mcg  137 mcg Oral 6am    losartan (COZAAR) tablet 100 mg  100 mg Oral DAILY    magnesium oxide (MAG-OX) tablet 400 mg 400 mg Oral DAILY    pantoprazole (PROTONIX) tablet 40 mg  40 mg Oral ACB    sodium chloride (NS) flush 5-40 mL  5-40 mL IntraVENous Q8H    sodium chloride (NS) flush 5-40 mL  5-40 mL IntraVENous PRN    acetaminophen (TYLENOL) tablet 650 mg  650 mg Oral Q4H PRN    ondansetron (ZOFRAN) injection 4 mg  4 mg IntraVENous Q4H PRN    magnesium hydroxide (MILK OF MAGNESIA) 400 mg/5 mL oral suspension 30 mL  30 mL Oral DAILY PRN    0.9% sodium chloride infusion  75 mL/hr IntraVENous CONTINUOUS    amLODIPine (NORVASC) tablet 2.5 mg  2.5 mg Oral DAILY    cefepime (MAXIPIME) 1 g in 0.9% sodium chloride (MBP/ADV) 50 mL  1 g IntraVENous Q12H     Data Review:   Recent Results (from the past 24 hour(s))   EKG, 12 LEAD, INITIAL    Collection Time: 07/29/20 12:17 PM   Result Value Ref Range    Ventricular Rate 68 BPM    Atrial Rate 69 BPM    P-R Interval 186 ms    QRS Duration 174 ms    Q-T Interval 492 ms    QTC Calculation (Bezet) 523 ms    Calculated R Axis -90 degrees    Calculated T Axis 67 degrees    Diagnosis       AV dual-paced rhythm  Abnormal ECG  When compared with ECG of 15-MAR-2020 16:21,  Vent.  rate has decreased BY  17 BPM  Confirmed by Temitope Kimbrough MD (), ALLISON QUIROGA ([id number]) on 7/29/2020 3:02:41 PM     Fernando Herrera    Collection Time: 07/29/20 10:17 PM   Result Value Ref Range    Vancomycin,trough 16.0 5 - 20 ug/mL   SARS-COV-2    Collection Time: 07/30/20  2:39 AM   Result Value Ref Range    Specimen source Nasopharyngeal      COVID-19 rapid test Not detected NOTD      SARS CoV-2 PENDING    CBC WITH AUTOMATED DIFF    Collection Time: 07/30/20  4:31 AM   Result Value Ref Range    WBC 11.0 4.3 - 11.1 K/uL    RBC 4.16 (L) 4.23 - 5.6 M/uL    HGB 11.1 (L) 13.6 - 17.2 g/dL    HCT 34.4 (L) 41.1 - 50.3 %    MCV 82.7 79.6 - 97.8 FL    MCH 26.7 26.1 - 32.9 PG    MCHC 32.3 31.4 - 35.0 g/dL    RDW 15.0 (H) 11.9 - 14.6 %    PLATELET 627 191 - 665 K/uL    MPV 10.7 9.4 - 12.3 FL    ABSOLUTE NRBC 0.00 0.0 - 0.2 K/uL    DF AUTOMATED      NEUTROPHILS 65 43 - 78 %    LYMPHOCYTES 22 13 - 44 %    MONOCYTES 7 4.0 - 12.0 %    EOSINOPHILS 5 0.5 - 7.8 %    BASOPHILS 1 0.0 - 2.0 %    IMMATURE GRANULOCYTES 1 0.0 - 5.0 %    ABS. NEUTROPHILS 7.1 1.7 - 8.2 K/UL    ABS. LYMPHOCYTES 2.4 0.5 - 4.6 K/UL    ABS. MONOCYTES 0.8 0.1 - 1.3 K/UL    ABS. EOSINOPHILS 0.6 0.0 - 0.8 K/UL    ABS. BASOPHILS 0.1 0.0 - 0.2 K/UL    ABS. IMM. GRANS. 0.1 0.0 - 0.5 K/UL       EKG:  (EKG has been independently visualized by me with interpretation below)  Assessment:     Principal Problem:    Cellulitis (7/28/2020)    Active Problems:    Hypertension (4/16/2013)      CAD (coronary artery disease) (4/16/2013)      Overview: 1. CABG 2005: LIMA to LAD, SVG to diagonal, SVG to OM and SVG to RCA      2. NSTEMI (12/11/13): Peak troponin 4.66.       A. LHC: Severe 3 vessel CAD. Occluded SVG to PDA and diagonal. Stenosis       in SVG to OM. B. PCI of SVG to OM: Distal body treated with 4 x 18 mm vision BMS. Prox       body lesion treated with integrity 4 x 18 BMS. C. Echo: Normal LV systolic function. EF 55-60%. Mild mitral/tricuspid       regurgitation. 3. Echo (9/23/14): EF 50-55%. Mild LAE. Mild mitral regurgitation. Hypothyroidism (4/16/2013)      Hyperlipidemia (4/16/2013)      GERD (gastroesophageal reflux disease) (4/16/2013)      Paroxysmal atrial fibrillation ([address] (6/18/2014)      Overview: 1. Atrial flutter Diagnosed 6/18/14: s/p ablation. 2. Echo (6/19/14): EF 45-50%. Moderate mitral regurgitation. 3. Ecardio (8/12-8/25/14): No atrial arrhythmias. 4.  Admitted with CVA (3/18): Atrial fibrillation noted. Eliquis       started. History of CVA (cerebrovascular accident) (6/18/2014)      Overview: History of mini-strokes. Chronic kidney disease (CKD), stage III (moderate) (HCC) (9/19/2016)      Leukocytosis (7/28/2020)      Normocytic anemia (7/28/2020)      Plan:   1.  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 I had a discussion today with the patient and wife regarding the risks, benefits, and alternatives of an implantable cardiac rhythm device and device extraction. I discussed in detail the potential benefits of pacemaker therapy that are largely aimed at improving symptoms from symptomatic bradycardia. Additionally, I discussed with the patient the potential risks device implantation, including the risk of bleeding, infection, large blood vessel injury, lung or cardiac injury, venous occlusion, DVT/PE, pneumothorax, cardiac tamponade, perforation, need for urgent open heart surgery or additional procedures, device/lead failure, lead dislodgement, heart attack, stroke, arrhythmia, oversedation, respiratory arrest, and even death. After our comprehensive discussion, the patient would like to proceed. --PPM system extraction and Right sided PPM implantation     Solomon Toscano MD  Cardiology/Electrophysiology

## 2020-07-30 NOTE — ROUTINE PROCESS
TRANSFER - IN REPORT: 
 
Verbal report received from Laura Mendez, [address] [place] [address] on Walker Adas being received from [facility] (post procedure) for routine progression of care. Report consisted of patients Situation, Background, Assessment and Recommendations(SBAR). Information from the following report(s) Procedure Summary, Recent Results and Cardiac Rhythm Paced  was reviewed. Opportunity for questions and clarification was provided. Assessment completed upon patients arrival to unit and care assumed. Patient received to room 305. Patient connected to monitor and assessment completed. Plan of care reviewed. Patient oriented to room and call light. Patient aware to use call light to communicate any chest pain or needs.

## 2020-07-30 NOTE — PROGRESS NOTES
Patient responding appropriately when spoken to. Wife at bedside communicating with patient as well. Will return to room 305 with hospital transport.  Right groin site remains intact with no bleeding or transport

## 2020-07-30 NOTE — PROGRESS NOTES
Report received from [facility]. Procedural findings communicated. Intra procedural  medication administration reviewed. Progression of care discussed. Patient received into [facility] 8 post procedure.      Incision site without bleeding or swelling yes    Dressing dry and intact yes    Patient instructed to limit movement to right upper extremity    Routine post procedural vital signs and site assessment initiated yes

## 2020-07-30 NOTE — ROUTINE PROCESS
Bedside and Verbal shift change report to be given to Kelvin Lemus RN (oncoming nurse) by self (offgoing nurse). Report included the following information SBAR, Kardex, Procedure Summary, Intake/Output and MAR.

## 2020-07-30 NOTE — PROGRESS NOTES
Pharmacokinetic Consult to Pharmacist    Dalila  is a 80 y.o. male being treated for PPM pocket infection with vancomycin and cefepime. Height: 6' 1\" (185.4 cm)  Weight: 104 kg (229 lb 4.5 oz)  Lab Results   Component Value Date/Time    BUN 29 (H) 07/29/2020 03:25 AM    Creatinine 1.54 (H) 07/29/2020 03:25 AM    WBC 11.0 07/30/2020 04:31 AM    Procalcitonin 0.09 03/15/2020 04:40 PM    Lactic acid 2.2 (HH) 03/15/2020 04:40 PM    Lactic acid 2.6 (H) 11/19/2013 05:46 PM    Lactic Acid (POC) 1.3 03/26/2018 04:06 PM      Estimated Creatinine Clearance: 44.4 mL/min (A) (based on SCr of 1.54 mg/dL (H)). Lab Results   Component Value Date/Time    Vancomycin,trough 16.0 07/29/2020 10:17 PM       Day 4 of vancomycin. Goal trough is 10 -20. Pre-steady state trough was 16. Will reduce vancomycin dose to 1000 mg IV q24h to target therapeutic range at steady state. Further levels will be ordered as clinically indicated. Pharmacy will continue to follow. Please call with any questions.     Thank you,  Nicole Joshi, PharmD, [facility]  Clinical Pharmacist  [phone]

## 2020-07-30 NOTE — ANESTHESIA POSTPROCEDURE EVALUATION
Procedure(s):  PACEMAKER INSERTION. total IV anesthesia    Anesthesia Post Evaluation      Multimodal analgesia: multimodal analgesia used between 6 hours prior to anesthesia start to PACU discharge  Patient location during evaluation: PACU  Patient participation: complete - patient participated  Level of consciousness: awake  Pain management: adequate  Airway patency: patent  Anesthetic complications: no  Cardiovascular status: acceptable  Respiratory status: spontaneous ventilation and acceptable  Hydration status: acceptable  Post anesthesia nausea and vomiting:  none      INITIAL Post-op Vital signs:   Vitals Value Taken Time   /62 7/30/2020  5:28 PM   Temp     Pulse 76 7/30/2020  5:29 PM   Resp     SpO2 94 % 7/30/2020  5:29 PM   Vitals shown include unvalidated device data.
150

## 2020-07-30 NOTE — PROGRESS NOTES
Infectious Disease Note    Today's Date: 2020   Admit Date: 2020    Impression:   · PPM pocket infection  · Hx of CHB s/p PPM (2019)  · OA s/p courtney TKAs     Plan:   ·  Continue Vancomycin, cefepime for now. · Follow along with cardiology plans since [facility] was unable to be placed. Anti-infectives:   · Vanc/Cefepime -    Subjective:   Doing well with abx for far. No change to L chest PM site     Allergies   Allergen Reactions    Other Medication Other (comments)     Wife reports an unknown reaction to an unknown pain medicine. Cannot recall name or details. Review of Systems:  A comprehensive review of systems was negative except for that written in the History of Present Illness. Objective:     Visit Vitals  /78   Pulse 68   Temp 98.1 °F (36.7 °C)   Resp 18   Ht 6' 1\" (1.854 m)   Wt 104 kg (229 lb 4.5 oz)   SpO2 96%   BMI 30.25 kg/m²     Temp (24hrs), Av.9 °F (36.6 °C), Min:97.4 °F (36.3 °C), Max:98.2 °F (36.8 °C)     No changes from my previous exam     Lines:  Peripheral IV:       Physical Exam:    General:  Alert, cooperative, pleasantly confused, appears stated age   Eyes:  Sclera anicteric. Pupils equally round and reactive to light. Mouth/Throat: Mucous membranes normal, oral pharynx clear   Neck: Supple   Lungs:   Clear to auscultation bilaterally, good effort   CV:  Regular rate and rhythm,no murmur, click, rub or gallop   Abdomen:   Soft, non-tender.  bowel sounds normal. non-distended   Extremities: No cyanosis or edema   Skin: L chest PM pocket w 2x2cm indurated, nodule consistent with abscess    Lymph nodes: Cervical and supraclavicular normal   Musculoskeletal: No swelling or deformity   Lines/Devices:  Intact, no erythema, drainage or tenderness   Psych: Alert, normal mood affect given the setting       Data Review:     CBC:  Recent Labs     20  0431 20  0325 20  0348   WBC 11.0 11.0 13.3*   GRANS 65 63 69   MONOS 7 7 8   EOS 5 4 3 ANEU 7.1 7.0 9.1*   ABL 2.4 2.7 2.6   HGB 11.1* 11.0* 11.4*   HCT 34.4* 33.6* 37.0*    261 293       BMP:  Recent Labs     07/29/20  0325 07/28/20  0348 07/27/20  2226   CREA 1.54* 1.59* 1.70*   BUN 29* 32* 32*    140 136   K 3.8 3.9 4.3    104 102   CO2 29 31 30   AGAP 7 5* 4*   * 132* 211*       LFTS:  Recent Labs     07/27/20 2226   TBILI 0.4   ALT 34   AP 88   TP 7.9   ALB 2.8*       Microbiology:     All Micro Results     Procedure Component Value Units Date/Time    CULTURE, BLOOD [[id number]] Collected:  07/27/20 2320    Order Status:  Completed Specimen:  Blood Updated:  07/29/20 0810     Special Requests: --        RIGHT  HAND       Culture result: NO GROWTH 2 DAYS       CULTURE, BLOOD [[id number]] Collected:  07/27/20 2321    Order Status:  Completed Specimen:  Blood Updated:  07/29/20 0810     Special Requests: --        RIGHT  FOREARM       Culture result: NO GROWTH 2 DAYS             Imaging:   n/a    Signed By: Britta Lima NP     July 30, 2020

## 2020-07-30 NOTE — PROGRESS NOTES
Hospitalist Note     Admit Date:  2020 10:11 PM   Name:  Keyanna Green   Age:  80 y.o.  :  1935   MRN:  [id number]   PCP:  Chalice Opitz, MD  Treatment Team: Attending Provider: Abby Mosquera MD; Consulting Provider: Juju Leyva MD; Consulting Provider: Emeterio Ramirez MD; Care Manager: Gita Burt, RN; Utilization Review: Ti Lobato Primary Nurse: Cristal Pires    HPI/Subjective:   Mr. Maryanne Ospina is a nice 79 y/o WM with a h/o atrial flutter, CHF s/p BiV PPM in 2019 who presented with progressive redness over his PPM site for a few days. WBCs mildly elevated at 13K, afebrile, labs otherwise normal. Admitted to hospitalist service.     : Unsuccessful Micra attempt yesterday. Planning right sided PPM today with left sided extraction. Wife, [facility], at bedside and updated on plan. No other complaints  Objective:     Patient Vitals for the past 24 hrs:   Temp Pulse Resp BP SpO2   20 0851 98.1 °F (36.7 °C) 68 18 150/78 96 %   20 0419 98.1 °F (36.7 °C) 68 18 168/85 98 %   20 0046 97.6 °F (36.4 °C) 70 18 133/60 97 %   20 2046 97.4 °F (36.3 °C) 70 18 147/78 96 %   20  67  126/63 97 %   20  66  115/56 97 %   20  67  103/54 97 %   20 1939 98 °F (36.7 °C) 60 14 96/48 97 %   20 1224 98.2 °F (36.8 °C) 69 18 154/79 96 %     Oxygen Therapy  O2 Sat (%): 96 % (20)  Pulse via Oximetry: 67 beats per minute (20)  O2 Device: Room air (20)  O2 Flow Rate (L/min): 2 l/min (20)    Estimated body mass index is 30.25 kg/m² as calculated from the following:    Height as of this encounter: 6' 1\" (1.854 m). Weight as of this encounter: 104 kg (229 lb 4.5 oz).       Intake/Output Summary (Last 24 hours) at 2020 1135  Last data filed at 2020 0959  Gross per 24 hour   Intake 290 ml   Output 17 ml   Net 273 ml       *Note that automatically entered I/Os may not be accurate; dependent on patient compliance with collection and accurate  by [employer]. General:    Well nourished. Alert. CV:   RRR. No murmur, rub, or gallop. Lungs:   CTAB. No wheezing, rhonchi, or rales. Abdomen:   Soft, nontender, nondistended. Extremities: Warm and dry. No cyanosis or edema. Skin:     No rashes or jaundice. Erythema with fluctuance noted over left side PPM pocket, 2-3cm below incision. Neuro:  No gross focal deficits    Data Reviewed:  I have reviewed all labs, meds, and studies from the last 24 hours:  Recent Results (from the past 24 hour(s))   EKG, 12 LEAD, INITIAL    Collection Time: 07/29/20 12:17 PM   Result Value Ref Range    Ventricular Rate 68 BPM    Atrial Rate 69 BPM    P-R Interval 186 ms    QRS Duration 174 ms    Q-T Interval 492 ms    QTC Calculation (Bezet) 523 ms    Calculated R Axis -90 degrees    Calculated T Axis 67 degrees    Diagnosis       AV dual-paced rhythm  Abnormal ECG  When compared with ECG of 15-MAR-2020 16:21,  Vent.  rate has decreased BY  17 BPM  Confirmed by Nanette Gonzalez MD (), ALLISON QUIROGA ([id number]) on 7/29/2020 3:02:41 PM     Trungaarti Butlers    Collection Time: 07/29/20 10:17 PM   Result Value Ref Range    Vancomycin,trough 16.0 5 - 20 ug/mL   SARS-COV-2    Collection Time: 07/30/20  2:39 AM   Result Value Ref Range    Specimen source Nasopharyngeal      COVID-19 rapid test Not detected NOTD      SARS CoV-2 PENDING    CBC WITH AUTOMATED DIFF    Collection Time: 07/30/20  4:31 AM   Result Value Ref Range    WBC 11.0 4.3 - 11.1 K/uL    RBC 4.16 (L) 4.23 - 5.6 M/uL    HGB 11.1 (L) 13.6 - 17.2 g/dL    HCT 34.4 (L) 41.1 - 50.3 %    MCV 82.7 79.6 - 97.8 FL    MCH 26.7 26.1 - 32.9 PG    MCHC 32.3 31.4 - 35.0 g/dL    RDW 15.0 (H) 11.9 - 14.6 %    PLATELET 491 587 - 632 K/uL    MPV 10.7 9.4 - 12.3 FL    ABSOLUTE NRBC 0.00 0.0 - 0.2 K/uL    DF AUTOMATED      NEUTROPHILS 65 43 - 78 %    LYMPHOCYTES 22 13 - 44 %    MONOCYTES 7 4.0 - 12.0 %    EOSINOPHILS 5 0.5 - 7.8 %    BASOPHILS 1 0.0 - 2.0 %    IMMATURE GRANULOCYTES 1 0.0 - 5.0 %    ABS. NEUTROPHILS 7.1 1.7 - 8.2 K/UL    ABS. LYMPHOCYTES 2.4 0.5 - 4.6 K/UL    ABS. MONOCYTES 0.8 0.1 - 1.3 K/UL    ABS. EOSINOPHILS 0.6 0.0 - 0.8 K/UL    ABS. BASOPHILS 0.1 0.0 - 0.2 K/UL    ABS. IMM.  GRANS. 0.1 0.0 - 0.5 K/UL        Current Meds:  Current Facility-Administered Medications   Medication Dose Route Frequency    vancomycin (VANCOCIN) 1,000 mg in 0.9% sodium chloride (MBP/ADV) 250 mL  1,000 mg IntraVENous Q24H    neomycin-bacitracin-polymyxin (NEOSPORIN) 3.5-400-10,000 mg-unit-unit/g ophthalmic ointment   Both Eyes QID    heparin (PF) 2 units/ml in NS infusion 2,000 Units  1,000 mL Irrigation PRN    heparin (PF) 2 units/ml in NS infusion 4,000 Units  2,000 mL Irrigation PRN    atorvastatin (LIPITOR) tablet 80 mg  80 mg Oral QHS    colchicine tablet 0.6 mg  0.6 mg Oral DAILY PRN    escitalopram oxalate (LEXAPRO) tablet 10 mg  10 mg Oral DAILY    hydroCHLOROthiazide (MICROZIDE) capsule 12.5 mg  12.5 mg Oral DAILY    levothyroxine (SYNTHROID) tablet 137 mcg  137 mcg Oral 6am    losartan (COZAAR) tablet 100 mg  100 mg Oral DAILY    magnesium oxide (MAG-OX) tablet 400 mg  400 mg Oral DAILY    pantoprazole (PROTONIX) tablet 40 mg  40 mg Oral ACB    sodium chloride (NS) flush 5-40 mL  5-40 mL IntraVENous Q8H    sodium chloride (NS) flush 5-40 mL  5-40 mL IntraVENous PRN    acetaminophen (TYLENOL) tablet 650 mg  650 mg Oral Q4H PRN    ondansetron (ZOFRAN) injection 4 mg  4 mg IntraVENous Q4H PRN    magnesium hydroxide (MILK OF MAGNESIA) 400 mg/5 mL oral suspension 30 mL  30 mL Oral DAILY PRN    0.9% sodium chloride infusion  75 mL/hr IntraVENous CONTINUOUS    amLODIPine (NORVASC) tablet 2.5 mg  2.5 mg Oral DAILY    cefepime (MAXIPIME) 1 g in 0.9% sodium chloride (MBP/ADV) 50 mL  1 g IntraVENous Q12H       Other Studies:  Results for orders placed or performed during the hospital encounter of 07/27/20   2D ECHO COMPLETE ADULT (TTE) W OR WO CONTR    Narrative    [address]   [place], [address] [facility]  [phone]    Transthoracic Echocardiogram  2D, M-mode, Doppler, and Color Doppler    Patient: Messi Bansal  MR #: [id number]  : [id number]  Age: 80 years  Gender: Male  Study date: 2020  Account #: [de-identified]  Height: 73 in  Weight: 224.6 lb  BSA: 2.26 mï¾²  Status:Routine  Location: 305  BP: 142/ 71    Allergies: OTHER MEDICATION    Sonographer:  Sarkis Maldonado RDCS  Group:  [facility]  Referring Physician:  Luis Hernández [address]  Reading Physician:  Gosia Joe. Shana Montoya MD [facility]    INDICATIONS: PPM pocket infection. *Patient scanned supine. *    PROCEDURE: This was a routine study. A transthoracic echocardiogram was  performed. The study included complete 2D imaging, M-mode, complete spectral  Doppler, and color Doppler. Image quality was adequate. LEFT VENTRICLE: Size was normal. Systolic function was normal. Ejection  fraction was estimated in the range of 55 % to 60 %. There were no regional  wall motion abnormalities. Wall thickness was normal. Avg. E/e': 10.5. Left  ventricular diastolic function parameters were normal.    RIGHT VENTRICLE: The size was normal. Systolic function was normal. A line  (catheter or pacing wire) was present. Estimated peak pressure was in the   range  of 30-35 mmHg. LEFT ATRIUM: Size was normal.    RIGHT ATRIUM: Size was normal. A line (catheter or pacing wire) was present. SYSTEMIC VEINS: IVC: The inferior vena cava was normal in size and course. The  respirophasic change in diameter was more than 50%. AORTIC VALVE: The valve was trileaflet. Leaflets exhibited mild sclerosis. There was no evidence for stenosis. There was no insufficiency. MITRAL VALVE: Valve structure was normal. There was no evidence for stenosis. There was trivial regurgitation.     TRICUSPID VALVE: The valve structure was normal. There was no evidence for  stenosis. There was mild regurgitation. PULMONIC VALVE: Not well visualized. There was no evidence for stenosis. There  was trivial regurgitation. PERICARDIUM: There was no pericardial effusion. AORTA: The root exhibited normal size. SUMMARY:    -  Left ventricle: Systolic function was normal. Ejection fraction was  estimated in the range of 55 % to 60 %. There were no regional wall motion  abnormalities. -  Inferior vena cava, hepatic veins: The respirophasic change in diameter   was  more than 50%. -  Tricuspid valve: There was mild regurgitation. SYSTEM MEASUREMENT TABLES    2D mode  AoR Diam (2D): 3.1 cm  LA Dimension (2D): 4.4 cm  Left Atrium Systolic Volume Index; Method of Disks, Biplane; 2D mode;: 31   ml/m2  IVS/LVPW (2D): 0.9  IVSd (2D): 1.3 cm  LVIDd (2D): 4.9 cm  LVIDs (2D): 3.6 cm  LVOT Area (2D): 4.9 cm2  LVPWd (2D): 1.4 cm  RVIDd (2D): 3.6 cm    Unspecified Scan Mode  Peak Grad; Mean; Antegrade Flow: 19 mm[Hg]  Vmax; Antegrade Flow: 198 cm/s  LVOT Diam: 2.5 cm    Prepared and signed by    [email or web address]. Tabitha Toscano MD [facility]  Signed 28-Jul-2020 15:23:32         No results found.     All Micro Results     Procedure Component Value Units Date/Time    CULTURE, BLOOD [[id number]] Collected:  07/27/20 2320    Order Status:  Completed Specimen:  Blood Updated:  07/30/20 1105     Special Requests: --        RIGHT  HAND       Culture result: NO GROWTH 3 DAYS       CULTURE, BLOOD [[id number]] Collected:  07/27/20 2321    Order Status:  Completed Specimen:  Blood Updated:  07/30/20 1105     Special Requests: --        RIGHT  FOREARM       Culture result: NO GROWTH 3 DAYS             SARS-CoV-2 Lab Results  \"Novel Coronavirus\" Test: No results found for: COV2NT   \"Emergent Disease\" Test: No results found for: EDPR  \"SARS-COV-2\" Test: No results found for: XGCOVT  \"Precision Labs\" Test: No results found for: RSLT  Rapid Test:   Lab Results   Component Value Date/Time    COVR Not detected 07/30/2020 02:39 AM            Assessment and Plan:     Hospital Problems as of 7/30/2020 Date Reviewed: 10/24/2019          Codes Class Noted - Resolved POA    Leukocytosis ICD-10-CM: D72.829  ICD-9-CM: 288.60  7/28/2020 - Present Yes        Normocytic anemia ICD-10-CM: D64.9  ICD-9-CM: 285.9  7/28/2020 - Present Yes        * (Principal) Cellulitis ICD-10-CM: L03.90  ICD-9-CM: 682.9  7/28/2020 - Present Yes        Chronic kidney disease (CKD), stage III (moderate) (HCC) ICD-10-CM: N18.3  ICD-9-CM: 585.3  9/19/2016 - Present Yes        Paroxysmal atrial fibrillation ([facility] ICD-10-CM: I48.0  ICD-9-CM: 427.31  6/18/2014 - Present Yes    Overview Addendum 12/13/2018  1:44 PM by Elena Greenwood MD     1. Atrial flutter Diagnosed 6/18/14: s/p ablation. 2. Echo (6/19/14): EF 45-50%. Moderate mitral regurgitation. 3. Ecardio (8/12-8/25/14): No atrial arrhythmias. 4.  Admitted with CVA (3/18): Atrial fibrillation noted. Eliquis started. History of CVA (cerebrovascular accident) ICD-10-CM: Z86.73  ICD-9-CM: V12.54  6/18/2014 - Present Yes    Overview Signed 9/29/2015 12:53 PM by Linus Gabriel     History of mini-strokes. Hypertension ICD-10-CM: I10  ICD-9-CM: 401.9  4/16/2013 - Present Yes        CAD (coronary artery disease) ICD-10-CM: I25.10  ICD-9-CM: 414.00  4/16/2013 - Present Yes    Overview Addendum 9/29/2015 12:49 PM by Linus Gabriel     1. CABG 2005: LIMA to LAD, SVG to diagonal, SVG to OM and SVG to RCA  2. NSTEMI (12/11/13): Peak troponin 4.66.   A. [facility]: Severe 3 vessel CAD. Occluded SVG to PDA and diagonal. Stenosis in SVG to OM. B. PCI of SVG to OM: Distal body treated with 4 x 18 mm vision BMS. Prox body lesion treated with integrity 4 x 18 BMS. C. Echo: Normal LV systolic function. EF 55-60%. Mild mitral/tricuspid regurgitation. 3. Echo (9/23/14): EF 50-55%. Mild LAE. Mild mitral regurgitation.              Hypothyroidism ICD-10-CM: E03.9  ICD-9-CM: 244.9  4/16/2013 - Present Yes        Hyperlipidemia ICD-10-CM: E78.5  ICD-9-CM: 272.4  4/16/2013 - Present Yes        GERD (gastroesophageal reflux disease) ICD-10-CM: K21.9  ICD-9-CM: 530.81  4/16/2013 - Present Yes              Plan:  # Cellulitis with concerns for PM pocket infection   - Blood cxs NTD. Vanc/Cefepime. - Unsuccessful Micra attempt on 7/29   - Right sided PPM planned 7/30 with left sided extraction. ID following. Con't abx. # H/o CHB/flutter              - S/p BiV PPM Jan 2019     # Hypothyroidism              - Synthroid    # CKD   - At baseline. # HTN              - Home meds     # CAD              - Statin.     # MDD              - Lexapro     # GERD              - PPI    DC planning/Dispo: Back home with HH. Diet:  DIET NPO  DVT ppx: SCDs.     Signed:  Michael Sanches MD

## 2020-07-30 NOTE — PROGRESS NOTES
TRANSFER - OUT REPORT:    Verbal report given to ALDAIR Burk on Araceli Oliva  being transferred to [facility] for routine post - op       Report consisted of patients Situation, Background, Assessment and   Recommendations(SBAR). Information from the following report(s) SBAR, Procedure Summary, Intake/Output, MAR and Cardiac Rhythm paced was reviewed with the receiving nurse. Lines:   Peripheral IV 07/30/20 Anterior;Right Forearm (Active)   Site Assessment Clean, dry, & intact; Clean;Dry 07/30/20 1700   Phlebitis Assessment 0 07/30/20 1700   Infiltration Assessment 0 07/30/20 1700   Dressing Status Clean, dry, & intact; Clean;Dry 07/30/20 1700   Dressing Type Tape;Transparent 07/30/20 1700   Hub Color/Line Status Patent; Infusing;Pink 07/30/20 1700   Alcohol Cap Used No 07/30/20 1215        Opportunity for questions and clarification was provided.

## 2020-07-31 VITALS
WEIGHT: 221.34 LBS | TEMPERATURE: 98.6 F | HEIGHT: 73 IN | SYSTOLIC BLOOD PRESSURE: 148 MMHG | OXYGEN SATURATION: 97 % | DIASTOLIC BLOOD PRESSURE: 83 MMHG | BODY MASS INDEX: 29.34 KG/M2 | RESPIRATION RATE: 18 BRPM | HEART RATE: 76 BPM

## 2020-07-31 LAB
BASOPHILS # BLD: 0.1 K/UL (ref 0–0.2)
BASOPHILS NFR BLD: 1 % (ref 0–2)
COVID-19 RAPID TEST, COVR: NOT DETECTED
DIFFERENTIAL METHOD BLD: ABNORMAL
EOSINOPHIL # BLD: 0.4 K/UL (ref 0–0.8)
EOSINOPHIL NFR BLD: 3 % (ref 0.5–7.8)
ERYTHROCYTE [DISTWIDTH] IN BLOOD BY AUTOMATED COUNT: 15.1 % (ref 11.9–14.6)
HCT VFR BLD AUTO: 35.6 % (ref 41.1–50.3)
HGB BLD-MCNC: 11.1 G/DL (ref 13.6–17.2)
IMM GRANULOCYTES # BLD AUTO: 0.1 K/UL (ref 0–0.5)
IMM GRANULOCYTES NFR BLD AUTO: 0 % (ref 0–5)
LYMPHOCYTES # BLD: 2.3 K/UL (ref 0.5–4.6)
LYMPHOCYTES NFR BLD: 19 % (ref 13–44)
MCH RBC QN AUTO: 26.1 PG (ref 26.1–32.9)
MCHC RBC AUTO-ENTMCNC: 31.2 G/DL (ref 31.4–35)
MCV RBC AUTO: 83.8 FL (ref 79.6–97.8)
MONOCYTES # BLD: 1 K/UL (ref 0.1–1.3)
MONOCYTES NFR BLD: 9 % (ref 4–12)
NEUTS SEG # BLD: 8.2 K/UL (ref 1.7–8.2)
NEUTS SEG NFR BLD: 68 % (ref 43–78)
NRBC # BLD: 0 K/UL (ref 0–0.2)
PLATELET # BLD AUTO: 276 K/UL (ref 150–450)
PMV BLD AUTO: 11 FL (ref 9.4–12.3)
RBC # BLD AUTO: 4.25 M/UL (ref 4.23–5.6)
SARS COV-2, XPGCVT: NEGATIVE
SOURCE, COVRS: NORMAL
WBC # BLD AUTO: 12 K/UL (ref 4.3–11.1)

## 2020-07-31 PROCEDURE — 74011250637 HC RX REV CODE- 250/637: Performed by: INTERNAL MEDICINE

## 2020-07-31 PROCEDURE — 99232 SBSQ HOSP IP/OBS MODERATE 35: CPT | Performed by: INTERNAL MEDICINE

## 2020-07-31 PROCEDURE — 85025 COMPLETE CBC W/AUTO DIFF WBC: CPT

## 2020-07-31 PROCEDURE — 36415 COLL VENOUS BLD VENIPUNCTURE: CPT

## 2020-07-31 PROCEDURE — 74011250636 HC RX REV CODE- 250/636: Performed by: INTERNAL MEDICINE

## 2020-07-31 PROCEDURE — 74011000258 HC RX REV CODE- 258: Performed by: INTERNAL MEDICINE

## 2020-07-31 RX ORDER — HYDROCODONE BITARTRATE AND ACETAMINOPHEN 5; 325 MG/1; MG/1
1 TABLET ORAL
Qty: 12 TAB | Refills: 0 | Status: SHIPPED | OUTPATIENT
Start: 2020-07-31 | End: 2020-08-03

## 2020-07-31 RX ORDER — MINOCYCLINE HYDROCHLORIDE 100 MG/1
100 CAPSULE ORAL 2 TIMES DAILY
Qty: 28 CAP | Refills: 0 | Status: SHIPPED | OUTPATIENT
Start: 2020-07-31 | End: 2020-08-14

## 2020-07-31 RX ORDER — CEFADROXIL 1000 MG/1
500 TABLET ORAL 2 TIMES DAILY
Qty: 14 TAB | Refills: 0 | Status: SHIPPED | OUTPATIENT
Start: 2020-07-31 | End: 2020-08-14

## 2020-07-31 RX ADMIN — HYDROCHLOROTHIAZIDE 12.5 MG: 12.5 CAPSULE ORAL at 09:18

## 2020-07-31 RX ADMIN — AMLODIPINE BESYLATE 2.5 MG: 5 TABLET ORAL at 09:18

## 2020-07-31 RX ADMIN — PANTOPRAZOLE SODIUM 40 MG: 40 TABLET, DELAYED RELEASE ORAL at 09:18

## 2020-07-31 RX ADMIN — LOSARTAN POTASSIUM 100 MG: 50 TABLET, FILM COATED ORAL at 09:18

## 2020-07-31 RX ADMIN — CEFEPIME HYDROCHLORIDE 1 G: 1 INJECTION, POWDER, FOR SOLUTION INTRAMUSCULAR; INTRAVENOUS at 09:19

## 2020-07-31 RX ADMIN — LEVOTHYROXINE SODIUM 137 MCG: 0.11 TABLET ORAL at 05:30

## 2020-07-31 RX ADMIN — ESCITALOPRAM OXALATE 10 MG: 10 TABLET ORAL at 09:18

## 2020-07-31 RX ADMIN — MAGNESIUM GLUCONATE 500 MG ORAL TABLET 400 MG: 500 TABLET ORAL at 09:18

## 2020-07-31 RX ADMIN — Medication 10 ML: at 05:02

## 2020-07-31 RX ADMIN — NEOMYCIN SULFATE, POLYMYXIN B SULFATE AND BACITRACIN ZINC: 3.5; 10000; 4 OINTMENT OPHTHALMIC at 13:30

## 2020-07-31 RX ADMIN — NEOMYCIN SULFATE, POLYMYXIN B SULFATE AND BACITRACIN ZINC: 3.5; 10000; 4 OINTMENT OPHTHALMIC at 09:20

## 2020-07-31 NOTE — DISCHARGE SUMMARY
Hospitalist Discharge Summary     Admit Date:  2020 10:11 PM   Name:  Keyanna Green   Age:  80 y.o.  :  1935   MRN:  [id number]   PCP:  Chalice Opitz, MD  Treatment Team: Attending Provider: Abby Mosquera MD; Consulting Provider: Juju Leyva MD; Care Manager: Gita Burt, RN; Utilization Review: [facility]    Problem List for this Hospitalization:  Hospital Problems as of 2020 Date Reviewed: 10/24/2019          Codes Class Noted - Resolved POA    Leukocytosis ICD-10-CM: H19.356  ICD-9-CM: 288.60  2020 - Present Yes        Normocytic anemia ICD-10-CM: D64.9  ICD-9-CM: 285.9  2020 - Present Yes        * (Principal) Cellulitis ICD-10-CM: L03.90  ICD-9-CM: 682.9  2020 - Present Yes        Chronic kidney disease (CKD), stage III (moderate) ([facility] ICD-10-CM: N18.3  ICD-9-CM: 585.3  2016 - Present Yes        Paroxysmal atrial fibrillation ([facility] ICD-10-CM: I48.0  ICD-9-CM: 427.31  2014 - Present Yes    Overview Addendum 2018  1:44 PM by Lizbeth Cueto MD     1. Atrial flutter Diagnosed 14: s/p ablation. 2. Echo (14): EF 45-50%. Moderate mitral regurgitation. 3. Ecardio (-14): No atrial arrhythmias. 4.  Admitted with CVA (3/18): Atrial fibrillation noted. Eliquis started. History of CVA (cerebrovascular accident) ICD-10-CM: Z86.73  ICD-9-CM: V12.54  2014 - Present Yes    Overview Signed 2015 12:53 PM by Rose Cabral     History of mini-strokes. Hypertension ICD-10-CM: I10  ICD-9-CM: 401.9  2013 - Present Yes        CAD (coronary artery disease) ICD-10-CM: I25.10  ICD-9-CM: 414.00  2013 - Present Yes    Overview Addendum 2015 12:49 PM by Rose Cabral     1. CABG 2005: LIMA to LAD, SVG to diagonal, SVG to OM and SVG to RCA  2. NSTEMI (13): Peak troponin 4.66.   A. LHC: Severe 3 vessel CAD. Occluded SVG to PDA and diagonal. Stenosis in SVG to OM.    B. PCI of SVG to OM: Distal body treated with 4 x 18 mm vision BMS. Prox body lesion treated with integrity 4 x 18 BMS. C. Echo: Normal LV systolic function. EF 55-60%. Mild mitral/tricuspid regurgitation. 3. Echo (9/23/14): EF 50-55%. Mild LAE. Mild mitral regurgitation. Hypothyroidism ICD-10-CM: E03.9  ICD-9-CM: 244.9  4/16/2013 - Present Yes        Hyperlipidemia ICD-10-CM: E78.5  ICD-9-CM: 272.4  4/16/2013 - Present Yes        GERD (gastroesophageal reflux disease) ICD-10-CM: K21.9  ICD-9-CM: 530.81  4/16/2013 - Present Yes            Hospital Course:  Mr. Maria Fernanda Toney is a nice 79 y/o WM with a h/o atrial flutter, CHF s/p BiV PPM in 2019 who presented with progressive redness over his PPM site for several days. WBCs mildly elevated at 13K and he was afebrile. He was admitted to the Hospitalist service and started on broad spectrum antibiotics. Cardiology/ID were both consulted. Micra leadless PPM placement was attempted but venous anatomy precluded that. He ultimately had his left sided PPM removed and a new device placed on right side 7/30. Blood cultures are negative to date. He is to remain on Minocycline/Duricef for 14 days from device removal. D/w Cardiology and he has outpatient follow up. He also needs PCP follow up. He is already active with home health and plans are for discharge home and resumption of this. His hospital course was otherwise unremarkable and he is medically stable for discharge. Disposition: Home Health Care Svc  Activity: Activity as tolerated  Diet: DIET CARDIAC Regular  Code Status: Full Code    Follow Up Orders:  No orders of the defined types were placed in this encounter. Follow-up Information     Follow up With Specialties Details [facility] OFFICE Cardiology   [address] [place] [facility]    Lazarus Bianchi, MD Family Medicine In 1 week Hospital f/u. Cellulitis.   [facility] [place]  [facility] [address]  [phone]            Discharge meds at bottom of this note. Plan was discussed with patient, nursing, CM, Cardiology NP. All questions answered. Patient was stable at time of discharge. Given instructions to call a physician or return if any concerns. Discharge summary and encounter summary was sent to PCP electronically via \"Comm Mgt\" link in [facility], if possible. Diagnostic Imaging/Tests:   Xr Chest Pa Lat    Result Date: 7/28/2020  EXAM: Chest x-ray. INDICATION: Pain and erythema adjacent to the pacemaker. COMPARISON: Prior chest x-ray on March 15, 2020. TECHNIQUE: Frontal and lateral view chest x-ray. FINDINGS: The lungs are clear. The cardiac size, mediastinal contour and pulmonary vasculature are normal. No pneumothorax or pleural effusion is seen. Again noted are postsurgical changes from a prior CABG, as well as a left chest wall biventricular pacemaker. The pacemaker is unchanged in appearance, and its leads are intact. IMPRESSION: No acute process. Us Chest    Result Date: 7/28/2020  Ultrasound chest soft tissue CLINICAL INDICATION: Focal swelling, lump, erythema of left anterior upper chest adjacent pacemaker COMPARISON: Chest radiography today TECHNIQUE: Sonographic gray scale and Doppler evaluation of left chest wall area of symptoms FINDINGS: There is a complex lobulated 2.2 x 1.5 x 1.7 cm fluid collection within superficial subcutaneous soft tissues, corresponding to the area of symptoms. There is posterior acoustical enhancement, and there is mild increased color Doppler flow to the surrounding tissues. Abscess is most likely although a hematoma could have a similar appearance in the appropriate clinical setting. IMPRESSION: Complex fluid collection within left chest wall; abscess versus hematoma.     Xr Chest Port    Result Date: 7/30/2020  AP chest radiograph History: Evaluate for pneumothorax, 80 years Male pacemaker Comparison: Chest radiograph July 28, 2020 Findings: There appears to have been interval exchange of the cardiac pacing device, now overlying the right hemithorax, with a single lead appearing in relative anatomic position overlying the right ventricle. Normal cardiomediastinal silhouette with evidence of CABG. Persistent low lung volumes. Trace left pleural effusion unchanged. Mild subsegmental atelectasis bilateral lung bases. No evidence of pneumothorax, or air space consolidation. Visualized soft tissue and osseous structures otherwise unremarkable. Impression:  Interval exchange of the cardiac pacing device, single lead appears in relative anatomic position. No evidence of pneumothorax. Echocardiogram results:  Results for orders placed or performed during the hospital encounter of 20   2D ECHO COMPLETE ADULT (TTE) W OR WO CONTR    Narrative    Lois  [address] [facility], [address] [facility]  [phone]    Transthoracic Echocardiogram  2D, M-mode, Doppler, and Color Doppler    Patient: Lisa Ozuna  MR #: [id number]  : [id number]  Age: 80 years  Gender: Male  Study date: 2020  Account #: [de-identified]  Height: 73 in  Weight: 224.6 lb  BSA: 2.26 mï¾²  Status:Routine  Location: 305  BP: 142/ 71    Allergies: OTHER MEDICATION    Sonographer:  Ivone Stringer RDCS  Group:  [facility]  Referring Physician:  Breana Gomez 34  Reading Physician:  Sourav Mccormick. Agnes Pineda MD [facility]    INDICATIONS: PPM pocket infection. *Patient scanned supine. *    PROCEDURE: This was a routine study. A transthoracic echocardiogram was  performed. The study included complete 2D imaging, M-mode, complete spectral  Doppler, and color Doppler. Image quality was adequate. LEFT VENTRICLE: Size was normal. Systolic function was normal. Ejection  fraction was estimated in the range of 55 % to 60 %. There were no regional  wall motion abnormalities. Wall thickness was normal. Avg. E/e': 10.5. Left  ventricular diastolic function parameters were normal.    RIGHT VENTRICLE: The size was normal. Systolic function was normal. A line  (catheter or pacing wire) was present. Estimated peak pressure was in the   range  of 30-35 mmHg. LEFT ATRIUM: Size was normal.    RIGHT ATRIUM: Size was normal. A line (catheter or pacing wire) was present. SYSTEMIC VEINS: IVC: The inferior vena cava was normal in size and course. The  respirophasic change in diameter was more than 50%. AORTIC VALVE: The valve was trileaflet. Leaflets exhibited mild sclerosis. There was no evidence for stenosis. There was no insufficiency. MITRAL VALVE: Valve structure was normal. There was no evidence for stenosis. There was trivial regurgitation. TRICUSPID VALVE: The valve structure was normal. There was no evidence for  stenosis. There was mild regurgitation. PULMONIC VALVE: Not well visualized. There was no evidence for stenosis. There  was trivial regurgitation. PERICARDIUM: There was no pericardial effusion. AORTA: The root exhibited normal size. SUMMARY:    -  Left ventricle: Systolic function was normal. Ejection fraction was  estimated in the range of 55 % to 60 %. There were no regional wall motion  abnormalities. -  Inferior vena cava, hepatic veins: The respirophasic change in diameter   was  more than 50%. -  Tricuspid valve: There was mild regurgitation. SYSTEM MEASUREMENT TABLES    2D mode  AoR Diam (2D): 3.1 cm  LA Dimension (2D): 4.4 cm  Left Atrium Systolic Volume Index; Method of Disks, Biplane; 2D mode;: 31   ml/m2  IVS/LVPW (2D): 0.9  IVSd (2D): 1.3 cm  LVIDd (2D): 4.9 cm  LVIDs (2D): 3.6 cm  LVOT Area (2D): 4.9 cm2  LVPWd (2D): 1.4 cm  RVIDd (2D): 3.6 cm    Unspecified Scan Mode  Peak Grad; Mean; Antegrade Flow: 19 mm[Hg]  Vmax; Antegrade Flow: 198 cm/s  LVOT Diam: 2.5 cm    Prepared and signed by    Андрей Kinney.  Annel Gavin MD [facility]  Signed 28-Jul-2020 15:23:32         Procedures done this admission:  Procedure(s):  PACEMAKER INSERTION    All Micro Results     Procedure Component Value Units Date/Time    CULTURE, BLOOD [[id number]] Collected:  07/27/20 2321    Order Status:  Completed Specimen:  Blood Updated:  07/31/20 1017     Special Requests: --        RIGHT  FOREARM       Culture result: NO GROWTH 4 DAYS       CULTURE, BLOOD [[id number]] Collected:  07/27/20 2320    Order Status:  Completed Specimen:  Blood Updated:  07/31/20 1017     Special Requests: --        RIGHT  HAND       Culture result: NO GROWTH 4 DAYS             SARS-CoV-2 Lab Results  \"Novel Coronavirus\" Test: No results found for: COV2NT   \"Emergent Disease\" Test: No results found for: EDPR  \"SARS-COV-2\" Test: No results found for: XGCOVT  \"Precision Labs\" Test: No results found for: RSLT  Rapid Test:   Lab Results   Component Value Date/Time    COVR Not detected 07/30/2020 02:39 AM            Labs: Results:       BMP, Mg, Phos Recent Labs     07/29/20  0325      K 3.8      CO2 29   AGAP 7   BUN 29*   CREA 1.54*   CA 8.2*   *      CBC Recent Labs     07/31/20  0431 07/30/20  0431 07/29/20  0325   WBC 12.0* 11.0 11.0   RBC 4.25 4.16* 4.09*   HGB 11.1* 11.1* 11.0*   HCT 35.6* 34.4* 33.6*    299 261   GRANS 68 65 63   LYMPH 19 22 25   EOS 3 5 4   MONOS 9 7 7   BASOS 1 1 1   IG 0 1 1   ANEU 8.2 7.1 7.0   ABL 2.3 2.4 2.7   SUMIT 0.4 0.6 0.5   ABM 1.0 0.8 0.8   ABB 0.1 0.1 0.1   AIG 0.1 0.1 0.1      LFT No results for input(s): ALT, TBIL, AP, TP, ALB, GLOB, AGRAT in the last 72 hours.     No lab exists for component: SGOT, GPT   Cardiac Testing Lab Results   Component Value Date/Time    BNP 97 (H) 02/04/2019 09:35 PM     06/18/2014 01:45 PM    CK 69 03/28/2018 12:59 PM     (H) 12/11/2013 09:10 PM     (H) 12/11/2013 03:10 PM    Troponin-I, Qt. <0.02 (L) 03/15/2020 04:40 PM    Troponin-I, Qt. 0.02 02/04/2019 09:35 PM    Troponin-I, Qt. <0.02 (L) 03/28/2018 10:43 PM      Coagulation Tests Lab Results Component Value Date/Time    Prothrombin time 16.6 (H) 03/15/2020 08:45 PM    INR 1.3 03/15/2020 08:45 PM    aPTT 37.5 (H) 03/15/2020 08:45 PM    aPTT 44.5 (H) 06/19/2014 11:12 AM    aPTT 50.2 (H) 06/19/2014 04:00 AM      A1c Lab Results   Component Value Date/Time    Hemoglobin A1c 5.7 03/27/2018 05:35 AM      Lipid Panel Lab Results   Component Value Date/Time    Cholesterol, total 104 01/30/2019 12:23 PM    HDL Cholesterol 30 (L) 01/30/2019 12:23 PM    LDL, calculated 49 01/30/2019 12:23 PM    VLDL, calculated 25 01/30/2019 12:23 PM    Triglyceride 125 01/30/2019 12:23 PM    CHOL/HDL Ratio 2.7 03/27/2018 05:35 AM      Thyroid Panel Lab Results   Component Value Date/Time    TSH 4.000 10/24/2019 03:50 PM    TSH 3.380 02/04/2019 09:35 PM    T4, Free 1.17 06/14/2018 03:41 PM    T4, Free 1.2 03/28/2018 11:54 AM        Most Recent UA Lab Results   Component Value Date/Time    Color YELLOW 03/15/2020 05:27 PM    Appearance TURBID 03/15/2020 05:27 PM    Specific gravity 1.012 03/15/2020 05:27 PM    pH (UA) 6.0 03/15/2020 05:27 PM    Protein 100 (A) 03/15/2020 05:27 PM    Glucose NEGATIVE  03/15/2020 05:27 PM    Ketone NEGATIVE  03/15/2020 05:27 PM    Bilirubin NEGATIVE  03/15/2020 05:27 PM    Blood LARGE (A) 03/15/2020 05:27 PM    Urobilinogen 0.2 03/15/2020 05:27 PM    Nitrites NEGATIVE  03/15/2020 05:27 PM    Leukocyte Esterase LARGE (A) 03/15/2020 05:27 PM    WBC >100 03/15/2020 05:27 PM    RBC >100 03/15/2020 05:27 PM    Epithelial cells 5-10 03/26/2018 04:27 PM    Bacteria 4+ (H) 03/15/2020 05:27 PM    Casts 3-5 03/26/2018 04:27 PM    Crystals, urine 0 01/16/2017 01:08 AM    Mucus 0 01/16/2017 01:08 AM    Other observations RESULTS VERIFIED MANUALLY 03/15/2020 05:27 PM        Allergies   Allergen Reactions    Other Medication Other (comments)     Wife reports an unknown reaction to an unknown pain medicine. Cannot recall name or details.         Immunization History   Administered Date(s) Administered   [address] [place] [address] Influenza High Dose Vaccine PF 10/04/2017, 10/14/2018    Influenza Vaccine 09/17/2015    Influenza Vaccine (Tri) Adjuvanted 10/24/2019    Influenza Vaccine PF 10/04/2017    Pneumococcal Conjugate (PCV-13) 03/11/2015    Pneumococcal Polysaccharide (PPSV-23) 06/20/2016    TB Skin Test (PPD) Intradermal 05/15/2016, 03/27/2018, 02/07/2019    Zoster Vaccine, Live 10/15/2018       All Labs from Last 24 Hrs:  Recent Results (from the past 24 hour(s))   EKG, 12 LEAD, INITIAL    Collection Time: 07/30/20  6:18 PM   Result Value Ref Range    Ventricular Rate 71 BPM    Atrial Rate 72 BPM    QRS Duration 202 ms    Q-T Interval 516 ms    QTC Calculation (Bezet) 560 ms    Calculated R Axis -88 degrees    Calculated T Axis 89 degrees    Diagnosis       Ventricular-paced rhythm  Abnormal ECG  When compared with ECG of 29-JUL-2020 12:17,  Vent. rate has increased BY   3 BPM  Confirmed by Josemanuel Casillas ([id number]) on 7/30/2020 7:42:06 PM     CBC WITH AUTOMATED DIFF    Collection Time: 07/31/20  4:31 AM   Result Value Ref Range    WBC 12.0 (H) 4.3 - 11.1 K/uL    RBC 4.25 4.23 - 5.6 M/uL    HGB 11.1 (L) 13.6 - 17.2 g/dL    HCT 35.6 (L) 41.1 - 50.3 %    MCV 83.8 79.6 - 97.8 FL    MCH 26.1 26.1 - 32.9 PG    MCHC 31.2 (L) 31.4 - 35.0 g/dL    RDW 15.1 (H) 11.9 - 14.6 %    PLATELET 257 668 - 317 K/uL    MPV 11.0 9.4 - 12.3 FL    ABSOLUTE NRBC 0.00 0.0 - 0.2 K/uL    DF AUTOMATED      NEUTROPHILS 68 43 - 78 %    LYMPHOCYTES 19 13 - 44 %    MONOCYTES 9 4.0 - 12.0 %    EOSINOPHILS 3 0.5 - 7.8 %    BASOPHILS 1 0.0 - 2.0 %    IMMATURE GRANULOCYTES 0 0.0 - 5.0 %    ABS. NEUTROPHILS 8.2 1.7 - 8.2 K/UL    ABS. LYMPHOCYTES 2.3 0.5 - 4.6 K/UL    ABS. MONOCYTES 1.0 0.1 - 1.3 K/UL    ABS. EOSINOPHILS 0.4 0.0 - 0.8 K/UL    ABS. BASOPHILS 0.1 0.0 - 0.2 K/UL    ABS. IMM.  GRANS. 0.1 0.0 - 0.5 K/UL       Discharge Exam:  Patient Vitals for the past 24 hrs:   Temp Pulse Resp BP SpO2   07/31/20 0843 98.4 °F (36.9 °C) 72 18 128/68 98 %   07/31/20 0445 97.7 °F (36.5 °C) 72 18 157/71 97 %   07/31/20 0127 97.7 °F (36.5 °C) 76 16 116/67 100 %   07/30/20 2043 98.1 °F (36.7 °C) 72 16 134/57 97 %   07/30/20 1803 97.7 °F (36.5 °C) 71 18 101/58 98 %   07/30/20 1737    (!) 110/98    07/30/20 1733  75  105/53 (!) 85 %   07/30/20 1728  76  116/62 95 %   07/30/20 1723  75  117/77 98 %   07/30/20 1718  75  (!) 123/106 99 %   07/30/20 1713  75  112/62 99 %   07/30/20 1705  70 17 119/67 99 %   07/30/20 1700 97.1 °F (36.2 °C) 70 16 119/65 98 %   07/30/20 1145 98.7 °F (37.1 °C) 68 18 153/65 97 %     Oxygen Therapy  O2 Sat (%): 98 % (07/31/20 0843)  Pulse via Oximetry: 75 beats per minute (07/30/20 1733)  O2 Device: Nasal cannula (07/31/20 0843)  O2 Flow Rate (L/min): 2 l/min (07/31/20 0843)    Estimated body mass index is 29.2 kg/m² as calculated from the following:    Height as of this encounter: 6' 1\" (1.854 m). Weight as of this encounter: 100.4 kg (221 lb 5.5 oz). Intake/Output Summary (Last 24 hours) at 7/31/2020 1116  Last data filed at 7/31/2020 0445  Gross per 24 hour   Intake 820 ml   Output 6 ml   Net 814 ml       *Note that automatically entered I/Os may not be accurate; dependent on patient compliance with collection and accurate  by assistants. General:    Well nourished. Alert. Eyes:   Normal sclerae. Extraocular movements intact. ENT:  Normocephalic, atraumatic. Moist mucous membranes  CV:   Regular rate and rhythm. No murmur, rub, or gallop. Lungs:  Clear to auscultation bilaterally. No wheezing, rhonchi, or rales. Abdomen: Soft, nontender, nondistended. Extremities: Warm and dry. No cyanosis or edema. Neurologic: CN II-XII grossly intact. No gross focal deficits   Skin:     Pressure bandage over old left sided pacemaker site so erythema not examined today. Bandage over right sided PPM.   Psych:  Normal mood and affect.     Current Med List in Hospital:   Current Facility-Administered Medications   Medication Dose Route Frequency    vancomycin (VANCOCIN) 1,000 mg in 0.9% sodium chloride (MBP/ADV) 250 mL  1,000 mg IntraVENous Q24H    sodium chloride (NS) flush 5-40 mL  5-40 mL IntraVENous Q8H    sodium chloride (NS) flush 5-40 mL  5-40 mL IntraVENous PRN    HYDROcodone-acetaminophen (NORCO) 5-325 mg per tablet 1 Tab  1 Tab Oral Q4H PRN    neomycin-bacitracin-polymyxin (NEOSPORIN) 3.5-400-10,000 mg-unit-unit/g ophthalmic ointment   Both Eyes QID    heparin (PF) 2 units/ml in NS infusion 2,000 Units  1,000 mL Irrigation PRN    heparin (PF) 2 units/ml in NS infusion 4,000 Units  2,000 mL Irrigation PRN    atorvastatin (LIPITOR) tablet 80 mg  80 mg Oral QHS    colchicine tablet 0.6 mg  0.6 mg Oral DAILY PRN    escitalopram oxalate (LEXAPRO) tablet 10 mg  10 mg Oral DAILY    hydroCHLOROthiazide (MICROZIDE) capsule 12.5 mg  12.5 mg Oral DAILY    levothyroxine (SYNTHROID) tablet 137 mcg  137 mcg Oral 6am    losartan (COZAAR) tablet 100 mg  100 mg Oral DAILY    magnesium oxide (MAG-OX) tablet 400 mg  400 mg Oral DAILY    pantoprazole (PROTONIX) tablet 40 mg  40 mg Oral ACB    sodium chloride (NS) flush 5-40 mL  5-40 mL IntraVENous Q8H    sodium chloride (NS) flush 5-40 mL  5-40 mL IntraVENous PRN    acetaminophen (TYLENOL) tablet 650 mg  650 mg Oral Q4H PRN    ondansetron (ZOFRAN) injection 4 mg  4 mg IntraVENous Q4H PRN    magnesium hydroxide (MILK OF MAGNESIA) 400 mg/5 mL oral suspension 30 mL  30 mL Oral DAILY PRN    0.9% sodium chloride infusion  75 mL/hr IntraVENous CONTINUOUS    amLODIPine (NORVASC) tablet 2.5 mg  2.5 mg Oral DAILY    cefepime (MAXIPIME) 1 g in 0.9% sodium chloride (MBP/ADV) 50 mL  1 g IntraVENous Q12H       Discharge Info:   Current Discharge Medication List      START taking these medications    Details   HYDROcodone-acetaminophen (NORCO) 5-325 mg per tablet Take 1 Tab by mouth every six (6) hours as needed for Pain for up to 3 days. Max Daily Amount: 4 Tabs.   Qty: 12 Tab, Refills: 0 Associated Diagnoses: Pacemaker infection, initial encounter ([facility] Cellulitis, unspecified cellulitis site      minocycline (MINOCIN, DYNACIN) 100 mg capsule Take 1 Cap by mouth two (2) times a day for 14 days. Qty: 28 Cap, Refills: 0      cefadroxil (DURICEF) 1 gram tablet Take 0.5 Tabs by mouth two (2) times a day for 14 days. Qty: 14 Tab, Refills: 0         CONTINUE these medications which have NOT CHANGED    Details   aspirin 81 mg chewable tablet Take 81 mg by mouth daily. apixaban (ELIQUIS) 2.5 mg tablet Take 2.5 mg by mouth two (2) times a day. !! cyanocobalamin (VITAMIN B12) 1,000 mcg/mL injection 1 ML INJECTED INTRAMUSCULARLY ONCE MONTHLY  Qty: 3 mL, Refills: 1    Associated Diagnoses: B12 deficiency      levothyroxine (SYNTHROID) 137 mcg tablet TAKE 137 MCG BY MOUTH DAILY (BEFORE BREAKFAST) FOR 30 DAYS. Qty: 90 Tab, Refills: 0      atorvastatin (LIPITOR) 80 mg tablet TAKE 1 TABLET BY MOUTH EVERY DAY  Qty: 90 Tab, Refills: 1    Associated Diagnoses: Mixed hyperlipidemia      hydroCHLOROthiazide (HYDRODIURIL) 25 mg tablet TAKE 1/2 TABLET BY MOUTH DAILY  Qty: 45 Tab, Refills: 3    Associated Diagnoses: Essential hypertension      pantoprazole (PROTONIX) 40 mg tablet TAKE 1 TABLET BY MOUTH EVERY DAY  Qty: 90 Tab, Refills: 3    Associated Diagnoses: Gastroesophageal reflux disease, esophagitis presence not specified      losartan (COZAAR) 100 mg tablet TAKE 1 TABLET BY MOUTH EVERY DAY  Qty: 90 Tab, Refills: 2    Associated Diagnoses: Essential hypertension      varicella-zoster recombinant, PF, (SHINGRIX, PF,) 50 mcg/0.5 mL susr injection 0.5mL by IntraMUSCular route once now and then repeat in 2-6 months  Qty: 0.5 mL, Refills: 1    Associated Diagnoses: Encounter for immunization      ergocalciferol (VITAMIN D2) 50,000 unit capsule Take 1 Cap by mouth every seven (7) days.   Qty: 4 Cap, Refills: 12    Associated Diagnoses: Vitamin D deficiency      escitalopram oxalate (LEXAPRO) 10 mg tablet TAKE 1 TABLET BY MOUTH EVERY DAY IN THE MORNING  Qty: 90 Tab, Refills: 3    Associated Diagnoses: Depression, unspecified depression type      nitroglycerin (NITROSTAT) 0.4 mg SL tablet 1 Tab by SubLINGual route every five (5) minutes as needed for Chest Pain. Qty: 1 Bottle, Refills: 3      amLODIPine (NORVASC) 5 mg tablet Take 1 Tab by mouth daily. Qty: 90 Tab, Refills: 3      !! cyanocobalamin (VITAMIN B-12) 1,000 mcg/mL injection 1 ml injected intramuscularly once monthly  Qty: 10 mL, Refills: 0    Associated Diagnoses: B12 deficiency      magnesium oxide (MAG-OX) 400 mg tablet Take 1 Tab by mouth daily. Qty: 90 Tab, Refills: 3    Associated Diagnoses: Slow transit constipation      !! OTHER Gel mattress overlay  Qty: 1 Each, Refills: 0    Associated Diagnoses: History of CVA (cerebrovascular accident)      nystatin (MYCOSTATIN) powder Apply  to affected area three (3) times daily as needed for Other (rash). Qty: 60 g, Refills: 3    Associated Diagnoses: Intertriginous dermatitis associated with moisture      !! OTHER Trapeze bar for bed mobility. Qty: 1 Each, Refills: 0    Associated Diagnoses: Bed confinement status      !! OTHER Wedges for propping in bed. Qty: 2 Each, Refills: 0    Associated Diagnoses: History of CVA (cerebrovascular accident); Bed confinement status       ! ! - Potential duplicate medications found. Please discuss with provider. STOP taking these medications       colchicine (MITIGARE) 0.6 mg capsule Comments:   Reason for Stopping:                 Time spent in patient discharge planning and coordination 35 minutes.     Signed:  Susie Mcmillan MD

## 2020-07-31 NOTE — PROGRESS NOTES
Care Management Interventions  PCP Verified by CM: Yes  Last Visit to PCP: 06/24/20  Mode of Transport at Discharge: Other (see comment)(Gris Munoz  Spouse  [phone]  )  Transition of Care Consult (CM Consult): Discharge Planning, [address]: No  Reason Outside [place]: Patient already serviced by other home care/hospice agency  Discharge Durable Medical Equipment: No  Physical Therapy Consult: Yes  Occupational Therapy Consult: Yes  Current Support Network: Lives with Spouse, Own Home  Confirm Follow Up Transport: Family  The Plan for Transition of Care is Related to the Following Treatment Goals : Home health  The Patient and/or Patient Representative was Provided with a Choice of Provider and Agrees with the Discharge Plan?: Yes  Name of the Patient Representative Who was Provided with a Choice of Provider and Agrees with the Discharge Plan: Patient and spouse  Freedom of Choice List was Provided with Basic Dialogue that Supports the Patient's Individualized Plan of Care/Goals, Treatment Preferences and Shares the Quality Data Associated with the Providers?: Yes  Discharge Location  Discharge Placement: Home with home health    CM discussed discharge plans with pt then by phone with pt's spouse. CM ordered and sent referral to [facility]  to resume care: RN, PT. CM established transport home for pt via [place] [facility] at 1330. Pt and his spouse agreeable.

## 2020-07-31 NOTE — ROUTINE PROCESS
Bedside and Verbal shift change report received from [facility] (offgoing nurse) to self (oncoming nurse). Report included the following information SBAR, Kardex, Intake/Output, MAR, Recent Results.

## 2020-07-31 NOTE — ROUTINE PROCESS
Bedside and Verbal shift change report to be given to Andrey Santos RN (oncoming nurse) by self (offgoing nurse). Report included the following information SBAR, Kardex, Procedure Summary, Intake/Output and MAR.

## 2020-07-31 NOTE — PROGRESS NOTES
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 NO cultures   · Hx of CHB s/p PPM (2019)  · OA s/p courtney TKAs     Plan:   ·  Continue Vancomycin, cefepime. Plan for 14 day duration post extraction (can be completed with oral abx--Minocycline 100mg oral BID/Duricef 500mg oral BID). EOT 20  · **Will sign off at this time. Please call with questions or concerns. Anti-infectives:   · Vanc/Cefepime -    Subjective:   No issues, always very pleasant. Wife not present. Allergies   Allergen Reactions    Other Medication Other (comments)     Wife reports an unknown reaction to an unknown pain medicine. Cannot recall name or details. Review of Systems:  A comprehensive review of systems was negative except for that written in the History of Present Illness. Objective:     Visit Vitals  /68   Pulse 72   Temp 98.4 °F (36.9 °C)   Resp 18   Ht 6' 1\" (1.854 m)   Wt 100.4 kg (221 lb 5.5 oz)   SpO2 98%   BMI 29.20 kg/m²     Temp (24hrs), Av.9 °F (36.6 °C), Min:97.1 °F (36.2 °C), Max:98.7 °F (37.1 °C)     No changes from my previous exam     Lines:  Peripheral IV:       Physical Exam:    General:  Alert, cooperative, pleasantly confused, appears stated age   Eyes:  Sclera anicteric. Pupils equally round and reactive to light. Mouth/Throat: Mucous membranes normal, oral pharynx clear   Neck: Supple   Lungs:   Clear to auscultation bilaterally, good effort   CV:  Regular rate and rhythm,no murmur, click, rub or gallop   Abdomen:   Soft, non-tender. bowel sounds normal. non-distended   Extremities: No cyanosis or edema   Skin: L chest with pressure dsg.  R chest with new PPM    Lymph nodes: Cervical and supraclavicular normal   Musculoskeletal: No swelling or deformity   Lines/Devices:  Intact, no erythema, drainage or tenderness   Psych: Alert, normal mood affect given the setting       Data Review:     CBC:  Recent Labs     07/31/20  0431 07/30/20 0431 07/29/20 0325   WBC 12.0* 11.0 11.0   GRANS 68 65 63   MONOS 9 7 7   EOS 3 5 4   ANEU 8.2 7.1 7.0   ABL 2.3 2.4 2.7   HGB 11.1* 11.1* 11.0*   HCT 35.6* 34.4* 33.6*    299 261       BMP:  Recent Labs     07/29/20 0325   CREA 1.54*   BUN 29*      K 3.8      CO2 29   AGAP 7   *       LFTS:  No results for input(s): TBILI, ALT, AP, TP, ALB in the last 72 hours.     No lab exists for component: SGOT    Microbiology:     All Micro Results     Procedure Component Value Units Date/Time    CULTURE, BLOOD [[id number]] Collected:  07/27/20 2320    Order Status:  Completed Specimen:  Blood Updated:  07/30/20 1105     Special Requests: --        RIGHT  HAND       Culture result: NO GROWTH 3 DAYS       CULTURE, BLOOD [[id number]] Collected:  07/27/20 2321    Order Status:  Completed Specimen:  Blood Updated:  07/30/20 1105     Special Requests: --        RIGHT  FOREARM       Culture result: NO GROWTH 3 DAYS             Imaging:   n/a    Signed By: Donta Reese NP     July 31, 2020

## 2020-07-31 NOTE — DISCHARGE INSTRUCTIONS
Patient Education        Cellulitis: Care Instructions  Your Care Instructions     Cellulitis is a skin infection caused by bacteria, most often strep or staph. It often occurs after a break in the skin from a scrape, cut, bite, or puncture, or after a rash. Cellulitis may be treated without doing tests to find out what caused it. But your doctor may do tests, if needed, to look for a specific bacteria, like methicillin-resistant Staphylococcus aureus (MRSA). The doctor has checked you carefully, but problems can develop later. If you notice any problems or new symptoms, get medical treatment right away. Follow-up care is a key part of your treatment and safety. Be sure to make and go to all appointments, and call your doctor if you are having problems. It's also a good idea to know your test results and keep a list of the medicines you take. How can you care for yourself at home? · Take your antibiotics as directed. Do not stop taking them just because you feel better. You need to take the full course of antibiotics. · Prop up the infected area on pillows to reduce pain and swelling. Try to keep the area above the level of your heart as often as you can. · If your doctor told you how to care for your wound, follow your doctor's instructions. If you did not get instructions, follow this general advice:  ? Wash the wound with clean water 2 times a day. Don't use hydrogen peroxide or alcohol, which can slow healing. ? You may cover the wound with a thin layer of petroleum jelly, such as Vaseline, and a nonstick bandage. ? Apply more petroleum jelly and replace the bandage as needed. · Be safe with medicines. Take pain medicines exactly as directed. ? If the doctor gave you a prescription medicine for pain, take it as prescribed. ? If you are not taking a prescription pain medicine, ask your doctor if you can take an over-the-counter medicine.   To prevent cellulitis in the future  · Try to prevent cuts, scrapes, or other injuries to your skin. Cellulitis most often occurs where there is a break in the skin. · If you get a scrape, cut, mild burn, or bite, wash the wound with clean water as soon as you can to help avoid infection. Don't use hydrogen peroxide or alcohol, which can slow healing. · If you have swelling in your legs (edema), support stockings and good skin care may help prevent leg sores and cellulitis. · Take care of your feet, especially if you have diabetes or other conditions that increase the risk of infection. Wear shoes and socks. Do not go barefoot. If you have athlete's foot or other skin problems on your feet, talk to your doctor about how to treat them. When should you call for help? Call your doctor now or seek immediate medical care if:  · You have signs that your infection is getting worse, such as:  ? Increased pain, swelling, warmth, or redness. ? Red streaks leading from the area. ? Pus draining from the area. ? A fever. · You get a rash. Watch closely for changes in your health, and be sure to contact your doctor if:  · You do not get better as expected. Where can you learn more? Go to http://www.gray.com/  Enter X309 in the search box to learn more about \"Cellulitis: Care Instructions. \"  Current as of: October 31, 2019               Content Version: 12.5  © 6324-2953 [employer]. Care instructions adapted under license by [employer] (which disclaims liability or warranty for this information). If you have questions about a medical condition or this instruction, always ask your healthcare professional. [place] [address] any warranty or liability for your use of this information. Patient Education      Cefadroxil Meribeth Karolina) - (By mouth)   Why this medicine is used:   Treats bacterial infections.   Contact a nurse or doctor right away if you have:  · Blistering or peeling skin  · Severe diarrhea that may contain blood     Common side effects:  · Vaginal itching or discharge  · Nausea  © 2017 [address] Information is for End User's use only and may not be sold, redistributed or otherwise used for commercial purposes. Patient Education      Narcotic-Analgesic/Acetaminophen (Percocet, [address], [facility], Lortab 10/325) - (By mouth)   Why this medicine is used:   Relieves pain. Contact a nurse or doctor right away if you have:  · Extreme weakness, shallow breathing, slow heartbeat  · Severe confusion, lightheadedness, dizziness, fainting  · Yellow skin or eyes, dark urine or pale stools  · Severe constipation, severe stomach pain, nausea, vomiting, loss of appetite  · Sweating or cold, clammy skin     Common side effects:  · Mild constipation, nausea, vomiting  · Sleepiness, tiredness  · Itching, rash  © 2017 [address] Information is for End User's use only and may not be sold, redistributed or otherwise used for commercial purposes. Patient Education      Minocycline, Regular Release (Minocin, Minocin Kit) - (By mouth)   Why this medicine is used:   Treats infections. Contact a nurse or doctor right away if you have:  · Blistering, peeling, red skin rash  · Dark urine or pale stools, yellow skin or eyes  · Decrease in how much or how often you urinate  · Nausea, vomiting, loss of appetite, stomach pain, diarrhea  · Joint pain, fever, rash, unusual tiredness or weakness  · Headache, vision loss, vision changes     Common side effects:  · Darkening of your skin, teeth, or gums  © 2017 [address] Information is for End User's use only and may not be sold, redistributed or otherwise used for commercial purposes.

## 2020-07-31 NOTE — PROGRESS NOTES
Hospitalist Note     Admit Date:  2020 10:11 PM   Name:  Juliette Sue   Age:  80 y.o.  :  1935   MRN:  [id number]   PCP:  Murray Joy MD  Treatment Team: Attending Provider: Evelin Phillips MD; Consulting Provider: Barbra Leyden, MD; Care Manager: Mario Newell, RN; Utilization Review: Rexie Burkitt    HPI/Subjective:   Mr. Bg Sykes is a nice 79 y/o WM with a h/o atrial flutter, CHF s/p BiV PPM in  who presented with progressive redness over his PPM site for a few days. WBCs mildly elevated at 13K, afebrile, labs otherwise normal. Started on abx. Cardiology/ID consulted. Attempted Micra placement but venous anatomy precluded that. Had left PPM removed and new device placed on right side .    : Doing well, pleasant. Blood cxs remain negative. Afebrile. Wife not present. No other complaints  Objective:     Patient Vitals for the past 24 hrs:   Temp Pulse Resp BP SpO2   20 0843 98.4 °F (36.9 °C) 72 18 128/68 98 %   20 0445 97.7 °F (36.5 °C) 72 18 157/71 97 %   20 0127 97.7 °F (36.5 °C) 76 16 116/67 100 %   20 2043 98.1 °F (36.7 °C) 72 16 134/57 97 %   20 1803 97.7 °F (36.5 °C) 71 18 101/58 98 %   20 1737    (!) 110/98    20 1733  75  105/53 (!) 85 %   20 1728  76  116/62 95 %   20 1723  75  117/77 98 %   20 1718  75  (!) 123/106 99 %   20 1713  75  112/62 99 %   20 1705  70 17 119/67 99 %   20 1700 97.1 °F (36.2 °C) 70 16 119/65 98 %   20 1145 98.7 °F (37.1 °C) 68 18 153/65 97 %     Oxygen Therapy  O2 Sat (%): 98 % (20)  Pulse via Oximetry: 75 beats per minute (20 1733)  O2 Device: Nasal cannula (20)  O2 Flow Rate (L/min): 2 l/min (20)    Estimated body mass index is 29.2 kg/m² as calculated from the following:    Height as of this encounter: 6' 1\" (1.854 m).     Weight as of this encounter: 100.4 kg (221 lb 5.5 oz).      Intake/Output Summary (Last 24 hours) at 7/31/2020 0958  Last data filed at 7/31/2020 0445  Gross per 24 hour   Intake 820 ml   Output 8 ml   Net 812 ml       *Note that automatically entered I/Os may not be accurate; dependent on patient compliance with collection and accurate  by techs. General:    Well nourished. Alert. CV:   RRR. No murmur, rub, or gallop. Lungs:   CTAB. No wheezing, rhonchi, or rales. Abdomen:   Soft, nontender, nondistended. Extremities: Warm and dry. No cyanosis or edema. Skin:     No rashes or jaundice. Pressure bandage overlying old left PPM site so erythema/pocket area not examined this morning. Bandage over new right sided PPM.   Neuro:  No gross focal deficits    Data Reviewed:  I have reviewed all labs, meds, and studies from the last 24 hours:  Recent Results (from the past 24 hour(s))   EKG, 12 LEAD, INITIAL    Collection Time: 07/30/20  6:18 PM   Result Value Ref Range    Ventricular Rate 71 BPM    Atrial Rate 72 BPM    QRS Duration 202 ms    Q-T Interval 516 ms    QTC Calculation (Bezet) 560 ms    Calculated R Axis -88 degrees    Calculated T Axis 89 degrees    Diagnosis       Ventricular-paced rhythm  Abnormal ECG  When compared with ECG of 29-JUL-2020 12:17,  Vent.  rate has increased BY   3 BPM  Confirmed by Alyce Chang ([id number]) on 7/30/2020 7:42:06 PM     CBC WITH AUTOMATED DIFF    Collection Time: 07/31/20  4:31 AM   Result Value Ref Range    WBC 12.0 (H) 4.3 - 11.1 K/uL    RBC 4.25 4.23 - 5.6 M/uL    HGB 11.1 (L) 13.6 - 17.2 g/dL    HCT 35.6 (L) 41.1 - 50.3 %    MCV 83.8 79.6 - 97.8 FL    MCH 26.1 26.1 - 32.9 PG    MCHC 31.2 (L) 31.4 - 35.0 g/dL    RDW 15.1 (H) 11.9 - 14.6 %    PLATELET 610 415 - 292 K/uL    MPV 11.0 9.4 - 12.3 FL    ABSOLUTE NRBC 0.00 0.0 - 0.2 K/uL    DF AUTOMATED      NEUTROPHILS 68 43 - 78 %    LYMPHOCYTES 19 13 - 44 %    MONOCYTES 9 4.0 - 12.0 %    EOSINOPHILS 3 0.5 - 7.8 %    BASOPHILS 1 0.0 - 2.0 %    IMMATURE GRANULOCYTES 0 0.0 - 5.0 %    ABS. NEUTROPHILS 8.2 1.7 - 8.2 K/UL    ABS. LYMPHOCYTES 2.3 0.5 - 4.6 K/UL    ABS. MONOCYTES 1.0 0.1 - 1.3 K/UL    ABS. EOSINOPHILS 0.4 0.0 - 0.8 K/UL    ABS. BASOPHILS 0.1 0.0 - 0.2 K/UL    ABS. IMM.  GRANS. 0.1 0.0 - 0.5 K/UL        Current Meds:  Current Facility-Administered Medications   Medication Dose Route Frequency    vancomycin (VANCOCIN) 1,000 mg in 0.9% sodium chloride (MBP/ADV) 250 mL  1,000 mg IntraVENous Q24H    sodium chloride (NS) flush 5-40 mL  5-40 mL IntraVENous Q8H    sodium chloride (NS) flush 5-40 mL  5-40 mL IntraVENous PRN    HYDROcodone-acetaminophen (NORCO) 5-325 mg per tablet 1 Tab  1 Tab Oral Q4H PRN    neomycin-bacitracin-polymyxin (NEOSPORIN) 3.5-400-10,000 mg-unit-unit/g ophthalmic ointment   Both Eyes QID    heparin (PF) 2 units/ml in NS infusion 2,000 Units  1,000 mL Irrigation PRN    heparin (PF) 2 units/ml in NS infusion 4,000 Units  2,000 mL Irrigation PRN    atorvastatin (LIPITOR) tablet 80 mg  80 mg Oral QHS    colchicine tablet 0.6 mg  0.6 mg Oral DAILY PRN    escitalopram oxalate (LEXAPRO) tablet 10 mg  10 mg Oral DAILY    hydroCHLOROthiazide (MICROZIDE) capsule 12.5 mg  12.5 mg Oral DAILY    levothyroxine (SYNTHROID) tablet 137 mcg  137 mcg Oral 6am    losartan (COZAAR) tablet 100 mg  100 mg Oral DAILY    magnesium oxide (MAG-OX) tablet 400 mg  400 mg Oral DAILY    pantoprazole (PROTONIX) tablet 40 mg  40 mg Oral ACB    sodium chloride (NS) flush 5-40 mL  5-40 mL IntraVENous Q8H    sodium chloride (NS) flush 5-40 mL  5-40 mL IntraVENous PRN    acetaminophen (TYLENOL) tablet 650 mg  650 mg Oral Q4H PRN    ondansetron (ZOFRAN) injection 4 mg  4 mg IntraVENous Q4H PRN    magnesium hydroxide (MILK OF MAGNESIA) 400 mg/5 mL oral suspension 30 mL  30 mL Oral DAILY PRN    0.9% sodium chloride infusion  75 mL/hr IntraVENous CONTINUOUS    amLODIPine (NORVASC) tablet 2.5 mg  2.5 mg Oral DAILY    cefepime (MAXIPIME) 1 g in 0.9% sodium chloride (MBP/ADV) 50 mL  1 g IntraVENous Q12H       Other Studies:  Results for orders placed or performed during the hospital encounter of 20   2D ECHO COMPLETE ADULT (TTE) W OR WO CONTR    Narrative    Lois Ye [address], [address] [facility]  [phone]    Transthoracic Echocardiogram  2D, M-mode, Doppler, and Color Doppler    Patient: Cate Donaldson  MR #: [id number]  : J8  Age: 80 years  Gender: Male  Study date: 2020  Account #: [de-identified]  Height: 73 in  Weight: 224.6 lb  BSA: 2.26 mï¾²  Status:Routine  Location: 305  BP: 142/ 71    Allergies: OTHER MEDICATION    Sonographer:  Roldan Hoover RDCS  Group:  [facility]  Referring Physician:  [address]  Reading Physician:  Gabino Wilder. Mickey Kelley MD [facility]    INDICATIONS: PPM pocket infection. *Patient scanned supine. *    PROCEDURE: This was a routine study. A transthoracic echocardiogram was  performed. The study included complete 2D imaging, M-mode, complete spectral  Doppler, and color Doppler. Image quality was adequate. LEFT VENTRICLE: Size was normal. Systolic function was normal. Ejection  fraction was estimated in the range of 55 % to 60 %. There were no regional  wall motion abnormalities. Wall thickness was normal. Avg. E/e': 10.5. Left  ventricular diastolic function parameters were normal.    RIGHT VENTRICLE: The size was normal. Systolic function was normal. A line  (catheter or pacing wire) was present. Estimated peak pressure was in the   range  of 30-35 mmHg. LEFT ATRIUM: Size was normal.    RIGHT ATRIUM: Size was normal. A line (catheter or pacing wire) was present. SYSTEMIC VEINS: IVC: The inferior vena cava was normal in size and course. The  respirophasic change in diameter was more than 50%. AORTIC VALVE: The valve was trileaflet. Leaflets exhibited mild sclerosis. There was no evidence for stenosis. There was no insufficiency.     MITRAL VALVE: Valve structure was normal. There was no evidence for stenosis. There was trivial regurgitation. TRICUSPID VALVE: The valve structure was normal. There was no evidence for  stenosis. There was mild regurgitation. PULMONIC VALVE: Not well visualized. There was no evidence for stenosis. There  was trivial regurgitation. PERICARDIUM: There was no pericardial effusion. AORTA: The root exhibited normal size. SUMMARY:    -  Left ventricle: Systolic function was normal. Ejection fraction was  estimated in the range of 55 % to 60 %. There were no regional wall motion  abnormalities. -  Inferior vena cava, hepatic veins: The respirophasic change in diameter   was  more than 50%. -  Tricuspid valve: There was mild regurgitation. SYSTEM MEASUREMENT TABLES    2D mode  AoR Diam (2D): 3.1 cm  LA Dimension (2D): 4.4 cm  Left Atrium Systolic Volume Index; Method of Disks, Biplane; 2D mode;: 31   ml/m2  IVS/LVPW (2D): 0.9  IVSd (2D): 1.3 cm  LVIDd (2D): 4.9 cm  LVIDs (2D): 3.6 cm  LVOT Area (2D): 4.9 cm2  LVPWd (2D): 1.4 cm  RVIDd (2D): 3.6 cm    Unspecified Scan Mode  Peak Grad; Mean; Antegrade Flow: 19 mm[Hg]  Vmax; Antegrade Flow: 198 cm/s  LVOT Diam: 2.5 cm    Prepared and signed by    Thania Jennings. [address] MD [facility]  Signed 28-Jul-2020 15:23:32         Xr Chest Port    Result Date: 7/30/2020  AP chest radiograph History: Evaluate for pneumothorax, 80 years Male pacemaker Comparison: Chest radiograph July 28, 2020 Findings: There appears to have been interval exchange of the cardiac pacing device, now overlying the right hemithorax, with a single lead appearing in relative anatomic position overlying the right ventricle. Normal cardiomediastinal silhouette with evidence of CABG. Persistent low lung volumes. Trace left pleural effusion unchanged. Mild subsegmental atelectasis bilateral lung bases. No evidence of pneumothorax, or air space consolidation.  Visualized soft tissue and osseous structures otherwise unremarkable. Impression:  Interval exchange of the cardiac pacing device, single lead appears in relative anatomic position. No evidence of pneumothorax. All Micro Results     Procedure Component Value Units Date/Time    CULTURE, BLOOD [[id number]] Collected:  07/27/20 2320    Order Status:  Completed Specimen:  Blood Updated:  07/30/20 1105     Special Requests: --        RIGHT  HAND       Culture result: NO GROWTH 3 DAYS       CULTURE, BLOOD [[id number]] Collected:  07/27/20 2321    Order Status:  Completed Specimen:  Blood Updated:  07/30/20 1105     Special Requests: --        RIGHT  FOREARM       Culture result: NO GROWTH 3 DAYS             SARS-CoV-2 Lab Results  \"Novel Coronavirus\" Test: No results found for: COV2NT   \"Emergent Disease\" Test: No results found for: EDPR  \"SARS-COV-2\" Test: No results found for: XGCOVT  \"[facility]ision Labs\" Test: No results found for: RSLT  Rapid Test:   Lab Results   Component Value Date/Time    COVR Not detected 07/30/2020 02:39 AM            Assessment and Plan:     Hospital Problems as of 7/31/2020 Date Reviewed: 10/24/2019          Codes Class Noted - Resolved POA    Leukocytosis ICD-10-CM: D72.829  ICD-9-CM: 288.60  7/28/2020 - Present Yes        Normocytic anemia ICD-10-CM: D64.9  ICD-9-CM: 285.9  7/28/2020 - Present Yes        * (Principal) Cellulitis ICD-10-CM: L03.90  ICD-9-CM: 682.9  7/28/2020 - Present Yes        Chronic kidney disease (CKD), stage III (moderate) ([facility] [address] ICD-10-CM: N18.3  ICD-9-CM: 585.3  9/19/2016 - Present Yes        Paroxysmal atrial fibrillation ([facility] [address] ICD-10-CM: I48.0  ICD-9-CM: 427.31  6/18/2014 - Present Yes    Overview Addendum 12/13/2018  1:44 PM by Gregory Zuniga MD     1. Atrial flutter Diagnosed 6/18/14: s/p ablation. 2. Echo (6/19/14): EF 45-50%. Moderate mitral regurgitation. 3. Ecardio (8/12-8/25/14): No atrial arrhythmias. 4.  Admitted with CVA (3/18): Atrial fibrillation noted. Eliquis started. History of CVA (cerebrovascular accident) ICD-10-CM: Z86.73  ICD-9-CM: V12.54  6/18/2014 - Present Yes    Overview Signed 9/29/2015 12:53 PM by Mario Sellersr     History of mini-strokes. Hypertension ICD-10-CM: I10  ICD-9-CM: 401.9  4/16/2013 - Present Yes        CAD (coronary artery disease) ICD-10-CM: I25.10  ICD-9-CM: 414.00  4/16/2013 - Present Yes    Overview Addendum 9/29/2015 12:49 PM by Mario Sellersr     1. CABG 2005: LIMA to LAD, SVG to diagonal, SVG to OM and SVG to RCA  2. NSTEMI (12/11/13): Peak troponin 4.66.   A. LHC: Severe 3 vessel CAD. Occluded SVG to PDA and diagonal. Stenosis in SVG to OM. B. PCI of SVG to OM: Distal body treated with 4 x 18 mm vision BMS. Prox body lesion treated with integrity 4 x 18 BMS. C. Echo: Normal LV systolic function. EF 55-60%. Mild mitral/tricuspid regurgitation. 3. Echo (9/23/14): EF 50-55%. Mild LAE. Mild mitral regurgitation. Hypothyroidism ICD-10-CM: E03.9  ICD-9-CM: 244.9  4/16/2013 - Present Yes        Hyperlipidemia ICD-10-CM: E78.5  ICD-9-CM: 272.4  4/16/2013 - Present Yes        GERD (gastroesophageal reflux disease) ICD-10-CM: K21.9  ICD-9-CM: 530.81  4/16/2013 - Present Yes              Plan:  # Cellulitis with concerns for PM pocket infection              - Blood cxs NTD. Vanc/Cefepime.   - WBCs marginally up but remains afebrile              - Unsuccessful Micra attempt on 7/29              - Right sided PPM with left sided extraction on 7/30.    - ID following. Con't IV, can transition to minocycline/Duracef at discharge with EOT 8/13/20.     # H/o CHB/flutter              - S/p BiV PPM Jan 2019     # Hypothyroidism              - Synthroid    # CKD              - At baseline.     # HTN              - Home meds     # CAD              - Statin.     # MDD              - Lexapro     # GERD              - PPI    DC planning/Dispo: Home to resume HH. Diet:  DIET CARDIAC  DVT ppx: SCDs.     Signed:  Tyson Martin MD

## 2020-07-31 NOTE — PROGRESS NOTES
am  7/31/2020 6:33 AM    Admit Date: 7/27/2020    Admit Diagnosis: Cellulitis [L03.90]      Subjective:    Patient underwent successful explant of Bi V pacing device for infection and implant of new VVI pacer.  Doing well this AM    Objective:      Visit Vitals  /71 (BP 1 Location: Left arm, BP Patient Position: At rest)   Pulse 72   Temp 97.7 °F (36.5 °C)   Resp 18   Ht 6' 1\" (1.854 m)   Wt 221 lb 5.5 oz (100.4 kg)   SpO2 97%   BMI 29.20 kg/m²       ROS:  General ROS: negative for - chills  Hematological and Lymphatic ROS: negative for - blood clots or jaundice  Respiratory ROS: no cough, shortness of breath, or wheezing  Cardiovascular ROS: no chest pain or dyspnea on exertion  Gastrointestinal ROS: no abdominal pain, change in bowel habits, or black or bloody stools  Neurological ROS: no TIA or stroke symptoms    Physical Exam:    Physical Examination: General appearance - alert, well appearing, and in no distress  Mental status - alert, oriented to person, place, and time  Eyes - pupils equal and reactive, extraocular eye movements intact  Neck/lymph - supple, no significant adenopathy  Chest/CV - clear to auscultation, no wheezes, rales or rhonchi, symmetric air entry  Heart - normal rate, regular rhythm, normal S1, S2, no murmurs, rubs, clicks or gallops  Abdomen/GI - soft, nontender, nondistended, no masses or organomegaly  Musculoskeletal - no joint tenderness, deformity or swelling  Extremities - peripheral pulses normal, no pedal edema, no clubbing or cyanosis  Skin - normal coloration and turgor, no rashes, no suspicious skin lesions noted    Current Facility-Administered Medications   Medication Dose Route Frequency    vancomycin (VANCOCIN) 1,000 mg in 0.9% sodium chloride (MBP/ADV) 250 mL  1,000 mg IntraVENous Q24H    sodium chloride (NS) flush 5-40 mL  5-40 mL IntraVENous Q8H    sodium chloride (NS) flush 5-40 mL  5-40 mL IntraVENous PRN    HYDROcodone-acetaminophen (NORCO) 5-325 mg per tablet 1 Tab  1 Tab Oral Q4H PRN    neomycin-bacitracin-polymyxin (NEOSPORIN) 3.5-400-10,000 mg-unit-unit/g ophthalmic ointment   Both Eyes QID    heparin (PF) 2 units/ml in NS infusion 2,000 Units  1,000 mL Irrigation PRN    heparin (PF) 2 units/ml in NS infusion 4,000 Units  2,000 mL Irrigation PRN    atorvastatin (LIPITOR) tablet 80 mg  80 mg Oral QHS    colchicine tablet 0.6 mg  0.6 mg Oral DAILY PRN    escitalopram oxalate (LEXAPRO) tablet 10 mg  10 mg Oral DAILY    hydroCHLOROthiazide (MICROZIDE) capsule 12.5 mg  12.5 mg Oral DAILY    levothyroxine (SYNTHROID) tablet 137 mcg  137 mcg Oral 6am    losartan (COZAAR) tablet 100 mg  100 mg Oral DAILY    magnesium oxide (MAG-OX) tablet 400 mg  400 mg Oral DAILY    pantoprazole (PROTONIX) tablet 40 mg  40 mg Oral ACB    sodium chloride (NS) flush 5-40 mL  5-40 mL IntraVENous Q8H    sodium chloride (NS) flush 5-40 mL  5-40 mL IntraVENous PRN    acetaminophen (TYLENOL) tablet 650 mg  650 mg Oral Q4H PRN    ondansetron (ZOFRAN) injection 4 mg  4 mg IntraVENous Q4H PRN    magnesium hydroxide (MILK OF MAGNESIA) 400 mg/5 mL oral suspension 30 mL  30 mL Oral DAILY PRN    0.9% sodium chloride infusion  75 mL/hr IntraVENous CONTINUOUS    amLODIPine (NORVASC) tablet 2.5 mg  2.5 mg Oral DAILY    cefepime (MAXIPIME) 1 g in 0.9% sodium chloride (MBP/ADV) 50 mL  1 g IntraVENous Q12H       Data Review:   @LABRCNT(Na,K,BUN,CREA,WBC,HGB,HCT,PLT,INR,TRP,TCHOL*,Triglyceride*,LDL*,LDLCPOC HDL*,HDL])@    TELEMETRY: paced    Assessment/Plan:     Principal Problem:    Cellulitis (7/28/2020)cont abx    Active Problems:    Hypertension (4/16/2013)The current medical regimen is effective;  continue present plan and medications. CAD (coronary artery disease) (4/16/2013)      Overview: 1. CABG 2005: LIMA to LAD, SVG to diagonal, SVG to OM and SVG to RCA      2. NSTEMI (12/11/13): Peak troponin 4.66.       A. LHC: Severe 3 vessel CAD.  Occluded SVG to PDA and diagonal. Stenosis in SVG to OM. B. PCI of SVG to OM: Distal body treated with 4 x 18 mm vision BMS. Prox       body lesion treated with integrity 4 x 18 BMS. C. Echo: Normal LV systolic function. EF 55-60%. Mild mitral/tricuspid       regurgitation. 3. Echo (9/23/14): EF 50-55%. Mild LAE. Mild mitral regurgitation. Hypothyroidism (4/16/2013)      Hyperlipidemia (4/16/2013)      GERD (gastroesophageal reflux disease) (4/16/2013)      Paroxysmal atrial fibrillation ([address] (6/18/2014)      Overview: 1. Atrial flutter Diagnosed 6/18/14: s/p ablation. 2. Echo (6/19/14): EF 45-50%. Moderate mitral regurgitation. 3. Ecardio (8/12-8/25/14): No atrial arrhythmias. 4.  Admitted with CVA (3/18): Atrial fibrillation noted. Eliquis       started. History of CVA (cerebrovascular accident) (6/18/2014)      Overview: History of mini-strokes. Chronic kidney disease (CKD), stage III (moderate) (HCC) (9/19/2016)      Leukocytosis (7/28/2020)      Normocytic anemia (7/28/2020)      Continue current meds.  New device in and old device removed    Preston Osorio MD

## 2020-07-31 NOTE — ROUTINE PROCESS
Discharge instructions reviewed with Pt's wife Lili Phelps via the phone. Prescriptions given for norco and meds and med info sheets provided for all new medications. Opportunity for questions provided. Pt's wife Lili Phelps voiced understanding of all discharge instructions. Unable to have her or pt sign for D/C paperwork due to pt situation. IV and heart monitor removed. Ambulance is scheduled to come at 1330. Update: ambulance has arrived to  pt. All belongings and D/C paperwork given to ambulance crew.

## 2020-08-01 LAB
BACTERIA SPEC CULT: NORMAL
BACTERIA SPEC CULT: NORMAL
SERVICE CMNT-IMP: NORMAL
SERVICE CMNT-IMP: NORMAL

## 2020-08-03 ENCOUNTER — PATIENT OUTREACH (OUTPATIENT)
Dept: CASE MANAGEMENT | Age: 85
End: 2020-08-03

## 2020-08-03 NOTE — PROGRESS NOTES
Transition of Care Hospital Discharge Follow-Up      Date/Time:  8/3/2020 3:09 PM    Patient was admitted to [facility] on 20 and discharged on 20 for Cellulitis, s/p pacemaker implant  . The physician discharge summary was available at the time of outreach. Patient was contacted within 1 business days of discharge. Inpatient RUR score: 24   Was this a readmission? no   Patient stated reason for the readmission: n/a  Patients top risk factors for readmission: complications of procedure and/or comorbidities      LPN Care Coordinator contacted the family by telephone to perform post hospital discharge assessment. Verified name and  with family as identifiers. Provided introduction to self, and explanation of the Care Coordinator role. Patient received hospital discharge instructions. LPN reviewed discharge instructions and red flags with patient who verbalized understanding. Patient given an opportunity to ask questions and does not have any further questions or concerns at this time. The patient agrees to contact the PCP office for questions related to their healthcare. LPN provided contact information for future reference. Home Health orders at discharge: RN/PT  [address]: Interim [facility]  Date of initial or scheduled visit: 20  (Assist with coordination of services if necessary.)    Durable Medical Equipment ordered at discharge: patient has needed [address]: n/a  Durable Medical Equipment received: n/a  (Assist patient in obtaining DME orders &/or equipment if necessary.)    Medication(s):   Medication review was performed with patient, who verbalizes understanding of administration of home medications. There were no barriers to obtaining medications identified at this time.       Current Outpatient Medications   Medication Sig    levothyroxine (SYNTHROID) 137 mcg tablet TAKE 137 MCG BY MOUTH DAILY (BEFORE BREAKFAST)    HYDROcodone-acetaminophen (NORCO) 5-325 mg per tablet Take 1 Tab by mouth every six (6) hours as needed for Pain for up to 3 days. Max Daily Amount: 4 Tabs.  minocycline (MINOCIN, DYNACIN) 100 mg capsule Take 1 Cap by mouth two (2) times a day for 14 days.  cefadroxil (DURICEF) 1 gram tablet Take 0.5 Tabs by mouth two (2) times a day for 14 days.  aspirin 81 mg chewable tablet Take 81 mg by mouth daily.  apixaban (ELIQUIS) 2.5 mg tablet Take 2.5 mg by mouth two (2) times a day.  cyanocobalamin (VITAMIN B12) 1,000 mcg/mL injection 1 ML INJECTED INTRAMUSCULARLY ONCE MONTHLY    atorvastatin (LIPITOR) 80 mg tablet TAKE 1 TABLET BY MOUTH EVERY DAY    hydroCHLOROthiazide (HYDRODIURIL) 25 mg tablet TAKE 1/2 TABLET BY MOUTH DAILY    pantoprazole (PROTONIX) 40 mg tablet TAKE 1 TABLET BY MOUTH EVERY DAY    losartan (COZAAR) 100 mg tablet TAKE 1 TABLET BY MOUTH EVERY DAY    varicella-zoster recombinant, PF, (SHINGRIX, PF,) 50 mcg/0.5 mL susr injection 0.5mL by IntraMUSCular route once now and then repeat in 2-6 months    ergocalciferol (VITAMIN D2) 50,000 unit capsule Take 1 Cap by mouth every seven (7) days.  escitalopram oxalate (LEXAPRO) 10 mg tablet TAKE 1 TABLET BY MOUTH EVERY DAY IN THE MORNING    nitroglycerin (NITROSTAT) 0.4 mg SL tablet 1 Tab by SubLINGual route every five (5) minutes as needed for Chest Pain.  amLODIPine (NORVASC) 5 mg tablet Take 1 Tab by mouth daily. (Patient taking differently: Take 2.5 mg by mouth daily.)    cyanocobalamin (VITAMIN B-12) 1,000 mcg/mL injection 1 ml injected intramuscularly once monthly    magnesium oxide (MAG-OX) 400 mg tablet Take 1 Tab by mouth daily.  OTHER Gel mattress overlay    nystatin (MYCOSTATIN) powder Apply  to affected area three (3) times daily as needed for Other (rash).  OTHER Trapeze bar for bed mobility.  OTHER Wedges for propping in bed. No current facility-administered medications for this visit.         There are no discontinued medications. ADL assessment:   (If patient is unable to perform ADLs  what is the limiting factor(s)? Do they have a support system that can assist? If no support system is present, discuss possible assistance that they may be able to obtain. Escalate for SW if ongoing issues are verbalized by pt or anticipated)    [facility] follow up appointment(s):   Future Appointments   Date Time Provider Whitney Dunne   8/13/2020 11:30 AM GVLLE DEVICE Anabella Antonio [address]   8/14/2020 12:00 PM Michell Foss MD [facility] FPA FPA   8/24/2020  2:40 PM KARLIE/CLEMSON DEVICE 55 [facility] [address]   10/1/2020  1:15 PM Miki Fowler MD [facility] UCDE UCD   10/7/2020  9:30 AM GVLLE REMOTE AICD [address]      Non-[facility] follow up appointment(s): n/a  7 Day follow up with PCP or Specialist: yes  Transportation arranged: no needs at this time    Covid Risk Education    Patient has following risk factors of: heart failure, CKD. Education provided regarding infection prevention, and signs and symptoms of COVID-19 and when to seek medical attention with family who verbalized understanding. Discussed exposure protocols and quarantine From CDC: Are you at higher risk for severe illness?  and given an opportunity for questions and concerns. The family agrees to contact the COVID-19 hotline [phone] or PCP office for questions related to COVID-19. For more information on steps you can take to protect yourself, see CDC's How to Protect Yourself     Patient/family/caregiver given information for GetWell Loop and agrees to enroll no      Any other questions or concerns expressed by patient? Not at this time    Scheduled next follow up call or referral to CTN/ ACM: [facility] will follow per workflow guidelines  Next follow up call: within 14 days    Goals Addressed                 This Visit's Progress     Attends follow-up appointments as directed.

## 2020-08-13 ENCOUNTER — PATIENT OUTREACH (OUTPATIENT)
Dept: CASE MANAGEMENT | Age: 85
End: 2020-08-13

## 2020-08-13 NOTE — PROGRESS NOTES
Transition of Care Hospital Discharge Follow-Up      Date/Time:  2020 3:09 PM    LPN Care Coordinator contacted the family by telephone to perform post hospital follow up. Verified name and  with family as identifiers. LPN reinforced discharge instructions and red flags with family who verbalized understanding. Family given an opportunity to ask questions and does not have any further questions or concerns at this time. The family agrees to contact the PCP office for questions related to their healthcare    [facility] follow up appointment(s):   Future Appointments   Date Time Provider Whitney Dunne   2020 12:00 PM Siobhan Mojica MD [facility] FPA FPA   10/1/2020  1:15 PM Shikha Fowler MD [facility] UCDE UCD   2020 10:20 AM KARLIE/AMY DEVICE 54 [facility] [address]      Non-[facility] follow up appointment(s): n/a  Patient attended follow up appointments since last contact: Mrs. Ronald Torres following as scheduled. Initially Mrs. Ronald Torres indicated patient as being unresponsive, after further questions and discussion patient is responding, \"he's just not turning\". Mrs. Ronald Torres states she has placed call to [facility] nurse and to provider for instruction. What was the outcome of the appointment:     [address]: remains active  [address] [place] [address] Way: Interim  Any issues/ progress:  (Assist with coordination of services if necessary.)      Medication(s):   Medication changes since discharge:     Current Outpatient Medications   Medication Sig    levothyroxine (SYNTHROID) 137 mcg tablet TAKE 137 MCG BY MOUTH DAILY (BEFORE BREAKFAST)    minocycline (MINOCIN, DYNACIN) 100 mg capsule Take 1 Cap by mouth two (2) times a day for 14 days.  cefadroxil (DURICEF) 1 gram tablet Take 0.5 Tabs by mouth two (2) times a day for 14 days.  aspirin 81 mg chewable tablet Take 81 mg by mouth daily.  apixaban (ELIQUIS) 2.5 mg tablet Take 2.5 mg by mouth two (2) times a day.     cyanocobalamin (VITAMIN B12) 1,000 mcg/mL injection 1 ML INJECTED INTRAMUSCULARLY ONCE MONTHLY    atorvastatin (LIPITOR) 80 mg tablet TAKE 1 TABLET BY MOUTH EVERY DAY    hydroCHLOROthiazide (HYDRODIURIL) 25 mg tablet TAKE 1/2 TABLET BY MOUTH DAILY    pantoprazole (PROTONIX) 40 mg tablet TAKE 1 TABLET BY MOUTH EVERY DAY    losartan (COZAAR) 100 mg tablet TAKE 1 TABLET BY MOUTH EVERY DAY    varicella-zoster recombinant, PF, (SHINGRIX, PF,) 50 mcg/0.5 mL susr injection 0.5mL by IntraMUSCular route once now and then repeat in 2-6 months    ergocalciferol (VITAMIN D2) 50,000 unit capsule Take 1 Cap by mouth every seven (7) days.  escitalopram oxalate (LEXAPRO) 10 mg tablet TAKE 1 TABLET BY MOUTH EVERY DAY IN THE MORNING    nitroglycerin (NITROSTAT) 0.4 mg SL tablet 1 Tab by SubLINGual route every five (5) minutes as needed for Chest Pain.  amLODIPine (NORVASC) 5 mg tablet Take 1 Tab by mouth daily. (Patient taking differently: Take 2.5 mg by mouth daily.)    cyanocobalamin (VITAMIN B-12) 1,000 mcg/mL injection 1 ml injected intramuscularly once monthly    magnesium oxide (MAG-OX) 400 mg tablet Take 1 Tab by mouth daily.  OTHER Gel mattress overlay    nystatin (MYCOSTATIN) powder Apply  to affected area three (3) times daily as needed for Other (rash).  OTHER Trapeze bar for bed mobility.  OTHER Wedges for propping in bed. No current facility-administered medications for this visit. Other Issues/ Miscellaneous? (Transportation, access to meals, ability to perform ADLs, adequate caregiver support, etc.) no  Referrals needed? (, Diabetes education, HH, etc.) no    Any other questions or concerns expressed by patient? Not at this time    Schedule next appointment with MAHESH MAYNARD or referral to CTN/ ACM: n/a  Graduation: yes  Next Outreach Scheduled: n/a    Goals      Attends follow-up appointments as directed.

## 2020-08-14 PROBLEM — F32.0 CURRENT MILD EPISODE OF MAJOR DEPRESSIVE DISORDER WITHOUT PRIOR EPISODE (HCC): Status: ACTIVE | Noted: 2020-08-14

## 2020-10-01 PROBLEM — Z95.0 PACEMAKER: Status: ACTIVE | Noted: 2020-10-01

## 2020-10-01 PROBLEM — R00.1 BRADYCARDIA: Status: RESOLVED | Noted: 2019-02-04 | Resolved: 2020-10-01

## 2021-03-23 NOTE — ED NOTES
Pt unable to provide urine sample at this time. Scribe Attestation (For Scribes USE Only)... Attending Attestation (For Attendings USE Only).../Scribe Attestation (For Scribes USE Only)...

## 2021-05-03 ENCOUNTER — APPOINTMENT (OUTPATIENT)
Dept: GENERAL RADIOLOGY | Age: 86
End: 2021-05-03
Attending: PHYSICIAN ASSISTANT
Payer: MEDICARE

## 2021-05-03 ENCOUNTER — HOSPITAL ENCOUNTER (EMERGENCY)
Age: 86
Discharge: HOME | End: 2021-05-04
Attending: EMERGENCY MEDICINE
Payer: MEDICARE

## 2021-05-03 DIAGNOSIS — J90 PLEURAL EFFUSION: ICD-10-CM

## 2021-05-03 DIAGNOSIS — I50.9 ACUTE ON CHRONIC CONGESTIVE HEART FAILURE, UNSPECIFIED HEART FAILURE TYPE (HCC): ICD-10-CM

## 2021-05-03 DIAGNOSIS — N39.0 ACUTE UTI (URINARY TRACT INFECTION): ICD-10-CM

## 2021-05-03 DIAGNOSIS — Z51.5 PATIENT ON FULL HOSPICE CARE: ICD-10-CM

## 2021-05-03 DIAGNOSIS — R31.0 GROSS HEMATURIA: Primary | ICD-10-CM

## 2021-05-03 LAB
ALBUMIN SERPL-MCNC: 2.8 G/DL (ref 3.2–4.6)
ALBUMIN/GLOB SERPL: 0.7 {RATIO} (ref 1.2–3.5)
ALP SERPL-CCNC: 111 U/L (ref 50–136)
ALT SERPL-CCNC: 35 U/L (ref 12–65)
AMORPH CRY URNS QL MICRO: ABNORMAL
ANION GAP SERPL CALC-SCNC: 8 MMOL/L (ref 7–16)
APPEARANCE UR: ABNORMAL
AST SERPL-CCNC: 31 U/L (ref 15–37)
BACTERIA URNS QL MICRO: ABNORMAL /HPF
BASOPHILS # BLD: 0.1 K/UL (ref 0–0.2)
BASOPHILS NFR BLD: 1 % (ref 0–2)
BILIRUB SERPL-MCNC: 0.6 MG/DL (ref 0.2–1.1)
BILIRUB UR QL: ABNORMAL
BNP SERPL-MCNC: 8025 PG/ML
BUN SERPL-MCNC: 17 MG/DL (ref 8–23)
CALCIUM SERPL-MCNC: 8.4 MG/DL (ref 8.3–10.4)
CHLORIDE SERPL-SCNC: 109 MMOL/L (ref 98–107)
CO2 SERPL-SCNC: 25 MMOL/L (ref 21–32)
COLOR UR: ABNORMAL
COVID-19 RAPID TEST, COVR: NOT DETECTED
CREAT SERPL-MCNC: 1.27 MG/DL (ref 0.8–1.5)
DIFFERENTIAL METHOD BLD: ABNORMAL
EOSINOPHIL # BLD: 0.3 K/UL (ref 0–0.8)
EOSINOPHIL NFR BLD: 3 % (ref 0.5–7.8)
EPI CELLS #/AREA URNS HPF: ABNORMAL /HPF
ERYTHROCYTE [DISTWIDTH] IN BLOOD BY AUTOMATED COUNT: 18.1 % (ref 11.9–14.6)
GLOBULIN SER CALC-MCNC: 4 G/DL (ref 2.3–3.5)
GLUCOSE SERPL-MCNC: 135 MG/DL (ref 65–100)
GLUCOSE UR STRIP.AUTO-MCNC: NEGATIVE MG/DL
HCT VFR BLD AUTO: 31.8 % (ref 41.1–50.3)
HGB BLD-MCNC: 9.7 G/DL (ref 13.6–17.2)
HGB UR QL STRIP: ABNORMAL
IMM GRANULOCYTES # BLD AUTO: 0 K/UL (ref 0–0.5)
IMM GRANULOCYTES NFR BLD AUTO: 0 % (ref 0–5)
KETONES UR QL STRIP.AUTO: NEGATIVE MG/DL
LACTATE SERPL-SCNC: 1.3 MMOL/L (ref 0.4–2)
LEUKOCYTE ESTERASE UR QL STRIP.AUTO: ABNORMAL
LYMPHOCYTES # BLD: 1.3 K/UL (ref 0.5–4.6)
LYMPHOCYTES NFR BLD: 16 % (ref 13–44)
MCH RBC QN AUTO: 23.6 PG (ref 26.1–32.9)
MCHC RBC AUTO-ENTMCNC: 30.5 G/DL (ref 31.4–35)
MCV RBC AUTO: 77.4 FL (ref 79.6–97.8)
MONOCYTES # BLD: 0.5 K/UL (ref 0.1–1.3)
MONOCYTES NFR BLD: 6 % (ref 4–12)
NEUTS SEG # BLD: 5.7 K/UL (ref 1.7–8.2)
NEUTS SEG NFR BLD: 74 % (ref 43–78)
NITRITE UR QL STRIP.AUTO: POSITIVE
NRBC # BLD: 0 K/UL (ref 0–0.2)
OTHER OBSERVATIONS,UCOM: ABNORMAL
PH UR STRIP: 6 [PH] (ref 5–9)
PLATELET # BLD AUTO: 273 K/UL (ref 150–450)
PMV BLD AUTO: 11.6 FL (ref 9.4–12.3)
POTASSIUM SERPL-SCNC: 3.4 MMOL/L (ref 3.5–5.1)
PROT SERPL-MCNC: 6.8 G/DL (ref 6.3–8.2)
PROT UR STRIP-MCNC: ABNORMAL MG/DL
RBC # BLD AUTO: 4.11 M/UL (ref 4.23–5.6)
RBC #/AREA URNS HPF: >100 /HPF
SODIUM SERPL-SCNC: 142 MMOL/L (ref 138–145)
SOURCE, COVRS: NORMAL
SP GR UR REFRACTOMETRY: 1.02 (ref 1–1.02)
UROBILINOGEN UR QL STRIP.AUTO: 0.2 EU/DL (ref 0.2–1)
WBC # BLD AUTO: 7.7 K/UL (ref 4.3–11.1)
WBC URNS QL MICRO: >100 /HPF

## 2021-05-03 PROCEDURE — 80053 COMPREHEN METABOLIC PANEL: CPT

## 2021-05-03 PROCEDURE — 81003 URINALYSIS AUTO W/O SCOPE: CPT

## 2021-05-03 PROCEDURE — 87635 SARS-COV-2 COVID-19 AMP PRB: CPT

## 2021-05-03 PROCEDURE — 71045 X-RAY EXAM CHEST 1 VIEW: CPT

## 2021-05-03 PROCEDURE — 83880 ASSAY OF NATRIURETIC PEPTIDE: CPT

## 2021-05-03 PROCEDURE — 83605 ASSAY OF LACTIC ACID: CPT

## 2021-05-03 PROCEDURE — 85025 COMPLETE CBC W/AUTO DIFF WBC: CPT

## 2021-05-03 PROCEDURE — 74011250637 HC RX REV CODE- 250/637: Performed by: PHYSICIAN ASSISTANT

## 2021-05-03 PROCEDURE — 99285 EMERGENCY DEPT VISIT HI MDM: CPT

## 2021-05-03 RX ORDER — SULFAMETHOXAZOLE AND TRIMETHOPRIM 800; 160 MG/1; MG/1
1 TABLET ORAL 2 TIMES DAILY
Qty: 14 TAB | Refills: 0 | Status: SHIPPED | OUTPATIENT
Start: 2021-05-03 | End: 2021-05-10

## 2021-05-03 RX ORDER — SULFAMETHOXAZOLE AND TRIMETHOPRIM 800; 160 MG/1; MG/1
1 TABLET ORAL
Status: COMPLETED | OUTPATIENT
Start: 2021-05-03 | End: 2021-05-03

## 2021-05-03 RX ORDER — FUROSEMIDE 40 MG/1
20 TABLET ORAL DAILY
Qty: 3 TAB | Refills: 0 | Status: SHIPPED | OUTPATIENT
Start: 2021-05-03 | End: 2021-05-08

## 2021-05-03 RX ORDER — FUROSEMIDE 40 MG/1
20 TABLET ORAL
Status: COMPLETED | OUTPATIENT
Start: 2021-05-03 | End: 2021-05-03

## 2021-05-03 RX ADMIN — FUROSEMIDE 20 MG: 40 TABLET ORAL at 21:18

## 2021-05-03 RX ADMIN — SULFAMETHOXAZOLE AND TRIMETHOPRIM 1 TABLET: 800; 160 TABLET ORAL at 21:18

## 2021-05-03 NOTE — ED TRIAGE NOTES
Pt BIB EMS from home for dark or bloody urine in azar catheter bag. Reports history of taking eliquis. History of multiple strokes and bed ridden.

## 2021-05-03 NOTE — ED PROVIDER NOTES
Patient is a 80-year-old male who comes in concern for hematuria. the wife reports that she used a urinal to collect his urine and noticed blood in the urine on Friday. She says she put this on his penis yesterday and there were some clots. He is taking Eliquis for history of TIAs and stroke. He also is on home oxygen according to the wife, normally on 3L NC. He is pleasantly confused at baseline without changes in mental status. She reports he has a chronic cough. She takes care of him from home and he is a hospice patient and has a hospice nurse who comes to the home 2x/week. She denies fevers or abdominal pain or any other symptoms. He is bed bound. Past Medical History:   Diagnosis Date    Arthritis     Asthma     as a kid. none recent    Atrial flutter with rapid ventricular response ([address] 6/18/2014    1. Diagnosed 6/18/14: s/p ablation. 2. Echo (6/19/14): EF 45-50%. Moderate mitral regurgitation. 3. Ecardio (8/12-8/25/14): No atrial arrhythmias. 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)     Carotid artery stenosis with cerebral infarction ([address] 9/29/2015    Followed by Dr. Belle Reyna.  Chest pain, unspecified 12/11/2013    CVA (cerebral vascular accident) ([address] 4/16/2013    Diarrhea following gastrointestinal surgery 4/16/2013    Diverticulitis colectomy    Dizziness 6/18/2014    Generalized weakness     GERD (gastroesophageal reflux disease)     Gout 4/16/2013    Heart disease, unspecified 2/20/2014    Hematoma 3/15/2020    History of basal cell cancer nose    History of CVA (cerebrovascular accident) 6/18/2014    History of mini-strokes.      History of kidney stones 2006    Hyperlipidemia 4/16/2013    Hypertension     Hypertrophy of prostate with urinary obstruction and other lower urinary tract symptoms (LUTS) 2/20/2014    Hypothyroidism 4/16/2013    Incomplete bladder emptying 2/20/2014    Iron deficiency anemia 4/16/2013    NSTEMI (non-ST elevated myocardial infarction) [facility] 12/12/2013    Osteoarthritis 4/16/2013    Platelet inhibition due to Plavix     Pure hypercholesterolemia 2/20/2014    Stroke [facility])     x3 with the last one being 2/12    Unspecified adverse effect of anesthesia     state he woke up during heart surgery    Unspecified asthma 2/20/2014    Unspecified disorder of thyroid 2/20/2014    Urinary frequency 2/20/2014    Urinary tract infection, site not specified 11/20/2013       Past Surgical History:   Procedure Laterality Date    CABG, ARTERY-VEIN, FOUR  2005    HX ADENOIDECTOMY      HX APPENDECTOMY  age 15   Colin [place]range HX CAROTID ENDARTERECTOMY Right 2011    HX CATARACT REMOVAL Bilateral 2006    HX CHOLECYSTECTOMY      HX CORONARY STENT PLACEMENT      HX OTHER SURGICAL  3/12/14    Plasma Button procedure of prostate    HX PACEMAKER PLACEMENT  02/06/2019    Dr. Angel Stone  07/30/2020    Dr. Oneyda Wills  12/12/2013    bmsx2 svg to !st obtuse marginal    REMOVAL OF KIDNEY STONE  05/2016    TOTAL KNEE ARTHROPLASTY  2007    right    TOTAL KNEE ARTHROPLASTY  2012    left         Family History:   Problem Relation Age of Onset    Heart Disease Father     Heart Attack Father     Stroke Sister     Cancer Sister         brain    Heart Disease Mother     Bleeding Prob Mother 80        breast cancer       Social History     Socioeconomic History    Marital status:      Spouse name: Not on file    Number of children: Not on file    Years of education: Not on file    Highest education level: Not on file   Occupational History    Not on file   Social Needs    Financial resource strain: Not on file    Food insecurity     Worry: Not on file     Inability: Not on file    Transportation needs     Medical: Not on file     Non-medical: Not on file   Tobacco Use    Smoking status: Former Smoker     Types: Cigars     Quit date: 2002     Years since quittin.3    Smokeless tobacco: Never Used    Tobacco comment: quit - cigars   Substance and Sexual Activity    Alcohol use: No     Alcohol/week: 0.0 standard drinks     Comment: rare    Drug use: No    Sexual activity: Not Currently   Lifestyle    Physical activity     Days per week: Not on file     Minutes per session: Not on file    Stress: Not on file   Relationships    Social connections     Talks on phone: Not on file     Gets together: Not on file     Attends Islam service: Not on file     Active member of club or organization: Not on file     Attends meetings of clubs or organizations: Not on file     Relationship status: Not on file    Intimate partner violence     Fear of current or ex partner: Not on file     Emotionally abused: Not on file     Physically abused: Not on file     Forced sexual activity: Not on file   Other Topics Concern    Not on file   Social History Narrative    Not on file         ALLERGIES: Other medication    Review of Systems   Constitutional: Negative for activity change. Respiratory: Positive for cough. Negative for shortness of breath and wheezing. Cardiovascular: Negative for chest pain and leg swelling. Gastrointestinal: Negative for abdominal pain. Genitourinary: Positive for hematuria. Negative for penile swelling and scrotal swelling. Skin: Negative for rash. Neurological: Negative for speech difficulty and numbness. All other systems reviewed and are negative.       Vitals:    21 1731 21 1829   BP: (!) 147/76    Pulse: 89    Resp: 18    Temp: 98.7 °F (37.1 °C)    SpO2: 91% (!) 2%   Weight: 100.2 kg (221 lb)    Height: 6' 1\" (1.854 m)             Physical Exam  Vitals signs and nursing note reviewed. Constitutional:       Appearance: Normal appearance. HENT:      Head: Normocephalic and atraumatic. Eyes:      Conjunctiva/sclera: Conjunctivae normal.   Cardiovascular:      Rate and Rhythm: Normal rate and regular rhythm. Pulmonary:      Effort: Pulmonary effort is normal. No tachypnea, accessory muscle usage, respiratory distress or retractions. Breath sounds: Examination of the right-lower field reveals decreased breath sounds. Examination of the left-lower field reveals decreased breath sounds. Decreased breath sounds present. No wheezing, rhonchi or rales. Abdominal:      General: Abdomen is flat. There is no distension. Palpations: Abdomen is soft. Tenderness: There is no abdominal tenderness. There is no right CVA tenderness, left CVA tenderness or guarding. Genitourinary:     Penis: Normal and uncircumcised. No phimosis, paraphimosis, erythema, tenderness, discharge, swelling or lesions. Testes: Normal.   Skin:     General: Skin is warm and dry. Neurological:      Mental Status: He is alert. Mental status is at baseline. He is disoriented. MDM  Number of Diagnoses or Management Options  Acute on chronic congestive heart failure, unspecified heart failure type [facility]): new and requires workup  Acute UTI (urinary tract infection): new and requires workup  Gross hematuria: new and requires workup  Patient on full hospice care: new and requires workup  Pleural effusion: new and requires workup  Diagnosis management comments: Patient is an 51-year-old male with multiple comorbidities on end-of-life hospice who comes in with his wife for evaluation of hematuria which has been on an off since Friday. He is on Eliquis for history of TIA/stroke. Initially on evaluation patient noted to be pleasant and oriented to person which is his baseline.   He had several episodes of coughing during my examination so I presented to obtain a chest x-ray, Covid swab and labs as well as urinalysis to assess for UTI. Chest x-ray showed bilateral pleural effusions so I proceeded to obtain an BNP which came back elevated at 8025. Covid swab negative. Patient does have a urinary tract infection and will be started on antibiotics for treatment of this. The wife would like to take the patient home and does feel comfortable doing so as she is his primary caregiver at home and they have a hospice nurse who comes to the house 2 times a week. Patient will be discharged home on oral antibiotics for his UTI and Lasix x5 days for CHF exacerbation given BUN and creatinine which are within normal limits today. He should follow up closely with the primary doctor in then next few days, call for appointment tomorrow. O2 sat 95% on 3L NC. Wife has pulse oximeter at home, understands that his O2 sat should be 92% and above. Return precautions discussed and the wife is in agreement with the plan.   Case discussed in detail with Dr. Magali Lugo.          Amount and/or Complexity of Data Reviewed  Clinical lab tests: reviewed and ordered  Tests in the radiology section of CPT®: reviewed and ordered  Tests in the medicine section of CPT®: reviewed and ordered  Discussion of test results with the performing providers: yes  Obtain history from someone other than the patient: yes (Wife)  Review and summarize past medical records: yes  Independent visualization of images, tracings, or specimens: yes    Risk of Complications, Morbidity, and/or Mortality  Presenting problems: moderate  Diagnostic procedures: low  Management options: low    Patient Progress  Patient progress: stable         Procedures

## 2021-05-04 VITALS
HEART RATE: 84 BPM | DIASTOLIC BLOOD PRESSURE: 86 MMHG | SYSTOLIC BLOOD PRESSURE: 154 MMHG | OXYGEN SATURATION: 99 % | TEMPERATURE: 99.8 F | RESPIRATION RATE: 26 BRPM | HEIGHT: 73 IN | BODY MASS INDEX: 29.29 KG/M2 | WEIGHT: 221 LBS

## 2021-05-04 NOTE — ED NOTES
I have reviewed discharge instructions with the spouse. The spouse verbalized understanding. Patient left ED via Discharge Method: [facility] ambulance to Home with wife/jose  Opportunity for questions and clarification provided. Patient given 2 scripts. To continue your aftercare when you leave the hospital, you may receive an automated call from our care team to check in on how you are doing. This is a free service and part of our promise to provide the best care and service to meet your aftercare needs.  If you have questions, or wish to unsubscribe from this service please call [phone]. Thank you for Choosing our [facility] Emergency Department.

## 2021-05-04 NOTE — DISCHARGE INSTRUCTIONS
Please give the patient 1/2 tablet of Lasix for 5 days. Give the patient Bactrim twice a day for 7 days. Monitor closely for any worsening symptoms and if they occur you may return to the emergency room. Please follow-up with the primary doctor within the next few days.

## 2021-07-15 ENCOUNTER — HOSPITAL ENCOUNTER (EMERGENCY)
Age: 86
Discharge: HOME | End: 2021-07-15
Attending: EMERGENCY MEDICINE
Payer: OTHER MISCELLANEOUS

## 2021-07-15 VITALS
DIASTOLIC BLOOD PRESSURE: 67 MMHG | RESPIRATION RATE: 20 BRPM | SYSTOLIC BLOOD PRESSURE: 135 MMHG | HEART RATE: 72 BPM | TEMPERATURE: 98.4 F | OXYGEN SATURATION: 100 %

## 2021-07-15 DIAGNOSIS — Z45.018 ENCOUNTER FOR INTERROGATION OF CARDIAC PACEMAKER: Primary | ICD-10-CM

## 2021-07-15 LAB
ANION GAP SERPL CALC-SCNC: 9 MMOL/L (ref 7–16)
ATRIAL RATE: 87 BPM
BASOPHILS # BLD: 0.1 K/UL (ref 0–0.2)
BASOPHILS NFR BLD: 1 % (ref 0–2)
BUN SERPL-MCNC: 23 MG/DL (ref 8–23)
CALCIUM SERPL-MCNC: 9.1 MG/DL (ref 8.3–10.4)
CALCULATED R AXIS, ECG10: -73 DEGREES
CALCULATED T AXIS, ECG11: 98 DEGREES
CHLORIDE SERPL-SCNC: 109 MMOL/L (ref 98–107)
CO2 SERPL-SCNC: 26 MMOL/L (ref 21–32)
CREAT SERPL-MCNC: 1.34 MG/DL (ref 0.8–1.5)
DIAGNOSIS, 93000: NORMAL
DIFFERENTIAL METHOD BLD: ABNORMAL
EOSINOPHIL # BLD: 0.4 K/UL (ref 0–0.8)
EOSINOPHIL NFR BLD: 5 % (ref 0.5–7.8)
ERYTHROCYTE [DISTWIDTH] IN BLOOD BY AUTOMATED COUNT: 17.3 % (ref 11.9–14.6)
GLUCOSE SERPL-MCNC: 93 MG/DL (ref 65–100)
HCT VFR BLD AUTO: 33.7 % (ref 41.1–50.3)
HGB BLD-MCNC: 10.1 G/DL (ref 13.6–17.2)
IMM GRANULOCYTES # BLD AUTO: 0 K/UL (ref 0–0.5)
IMM GRANULOCYTES NFR BLD AUTO: 1 % (ref 0–5)
LYMPHOCYTES # BLD: 2.5 K/UL (ref 0.5–4.6)
LYMPHOCYTES NFR BLD: 30 % (ref 13–44)
MAGNESIUM SERPL-MCNC: 1.5 MG/DL (ref 1.8–2.4)
MCH RBC QN AUTO: 23.7 PG (ref 26.1–32.9)
MCHC RBC AUTO-ENTMCNC: 30 G/DL (ref 31.4–35)
MCV RBC AUTO: 79.1 FL (ref 79.6–97.8)
MONOCYTES # BLD: 0.5 K/UL (ref 0.1–1.3)
MONOCYTES NFR BLD: 6 % (ref 4–12)
NEUTS SEG # BLD: 5 K/UL (ref 1.7–8.2)
NEUTS SEG NFR BLD: 59 % (ref 43–78)
NRBC # BLD: 0 K/UL (ref 0–0.2)
PLATELET # BLD AUTO: 255 K/UL (ref 150–450)
PMV BLD AUTO: 11.9 FL (ref 9.4–12.3)
POTASSIUM SERPL-SCNC: 3.9 MMOL/L (ref 3.5–5.1)
Q-T INTERVAL, ECG07: 468 MS
QRS DURATION, ECG06: 202 MS
QTC CALCULATION (BEZET), ECG08: 549 MS
RBC # BLD AUTO: 4.26 M/UL (ref 4.23–5.6)
SODIUM SERPL-SCNC: 144 MMOL/L (ref 136–145)
VENTRICULAR RATE, ECG03: 83 BPM
WBC # BLD AUTO: 8.5 K/UL (ref 4.3–11.1)

## 2021-07-15 PROCEDURE — 99285 EMERGENCY DEPT VISIT HI MDM: CPT

## 2021-07-15 PROCEDURE — 80048 BASIC METABOLIC PNL TOTAL CA: CPT

## 2021-07-15 PROCEDURE — 93005 ELECTROCARDIOGRAM TRACING: CPT | Performed by: EMERGENCY MEDICINE

## 2021-07-15 PROCEDURE — 85025 COMPLETE CBC W/AUTO DIFF WBC: CPT

## 2021-07-15 PROCEDURE — 83735 ASSAY OF MAGNESIUM: CPT

## 2021-07-15 RX ORDER — SODIUM CHLORIDE 0.9 % (FLUSH) 0.9 %
5-40 SYRINGE (ML) INJECTION EVERY 8 HOURS
Status: DISCONTINUED | OUTPATIENT
Start: 2021-07-15 | End: 2021-07-16 | Stop reason: HOSPADM

## 2021-07-15 RX ORDER — SODIUM CHLORIDE 0.9 % (FLUSH) 0.9 %
5-40 SYRINGE (ML) INJECTION AS NEEDED
Status: DISCONTINUED | OUTPATIENT
Start: 2021-07-15 | End: 2021-07-16 | Stop reason: HOSPADM

## 2021-07-15 NOTE — ED NOTES
I have reviewed discharge instructions with the spouse. The spouse verbalized understanding. Patient left ED via Discharge Method: stretcher to home with [facility]     Opportunity for questions and clarification provided. Patient given 0 scripts. To continue your aftercare when you leave the hospital, you may receive an automated call from our care team to check in on how you are doing. This is a free service and part of our promise to provide the best care and service to meet your aftercare needs.  If you have questions, or wish to unsubscribe from this service please call [phone]. Thank you for Choosing our [facility] Emergency Department.

## 2021-07-15 NOTE — ED PROVIDER NOTES
66-year-old gentleman presents with concerns about his family being worried that his pacemaker may not be working correctly. They said that he has had a couple episodes where his heart rate gets down a little low when he did not think it was post to do that. Patient says he has no symptoms and he feels fine. He has had no nausea, vomiting, or diarrhea. Wife also notes that he has had a couple of episodes of sweating at night. He has not had any fevers or chills and has had no cough or shortness of breath. He has had no nausea, vomiting, or diarrhea. No other associated symptoms. He is chronically on oxygen and does have a history of COPD. Elements of this note were created using speech recognition software. As such, errors of speech recognition may be present. Past Medical History:   Diagnosis Date    Arthritis     Asthma     as a kid. none recent    Atrial flutter with rapid ventricular response ([address] 6/18/2014    1. Diagnosed 6/18/14: s/p ablation. 2. Echo (6/19/14): EF 45-50%. Moderate mitral regurgitation. 3. Ecardio (8/12-8/25/14): No atrial arrhythmias.  Basal cell carcinoma of skin of other and unspecified parts of face 2/20/2014    Benign hypertensive heart disease without heart failure 9/29/2015    BPH (benign prostatic hyperplasia) 4/16/2013    CAD (coronary artery disease)     CABG, cardiac cath , stents times 2    Calculus of kidney 2/20/2014    Cancer [facility])     Carotid artery stenosis with cerebral infarction ([address] 9/29/2015    Followed by Dr. Davonte Hope.     Chest pain, unspecified 12/11/2013    CVA (cerebral vascular accident) ([address] 4/16/2013    Diarrhea following gastrointestinal surgery 4/16/2013    Diverticulitis colectomy    Dizziness 6/18/2014    Generalized weakness     GERD (gastroesophageal reflux disease)     Gout 4/16/2013    Heart disease, unspecified 2/20/2014    Hematoma 3/15/2020    History of basal cell cancer nose    History of CVA (cerebrovascular accident) 6/18/2014    History of mini-strokes.      History of kidney stones 2006    Hyperlipidemia 4/16/2013    Hypertension     Hypertrophy of prostate with urinary obstruction and other lower urinary tract symptoms (LUTS) 2/20/2014    Hypothyroidism 4/16/2013    Incomplete bladder emptying 2/20/2014    Iron deficiency anemia 4/16/2013    NSTEMI (non-ST elevated myocardial infarction) [facility] 12/12/2013    Osteoarthritis 4/16/2013    Platelet inhibition due to Plavix     Pure hypercholesterolemia 2/20/2014    Stroke [facility])     x3 with the last one being 2/12    Unspecified adverse effect of anesthesia     state he woke up during heart surgery    Unspecified asthma 2/20/2014    Unspecified disorder of thyroid 2/20/2014    Urinary frequency 2/20/2014    Urinary tract infection, site not specified 11/20/2013       Past Surgical History:   Procedure Laterality Date    CABG, ARTERY-VEIN, FOUR  2005    HX ADENOIDECTOMY      HX APPENDECTOMY  age 15   [place] Ranjit HX CAROTID ENDARTERECTOMY Right 2011    HX CATARACT REMOVAL Bilateral 2006    HX CHOLECYSTECTOMY      HX CORONARY STENT PLACEMENT      HX OTHER SURGICAL  3/12/14    Plasma Button procedure of prostate    HX PACEMAKER PLACEMENT  02/06/2019    Dr. Ministerio Arevalo  07/30/2020    Dr. Stahl Phi  12/12/2013    bmsx2 svg to !st obtuse marginal    REMOVAL OF KIDNEY STONE  05/2016    TOTAL KNEE ARTHROPLASTY  2007    right    TOTAL KNEE ARTHROPLASTY  2012    left         Family History:   Problem Relation Age of Onset    Heart Disease Father     Heart Attack Father     Stroke Sister     Cancer Sister         brain    Heart Disease Mother     Bleeding Prob Mother 80        breast cancer       Social History     Socioeconomic History    Marital status:      Spouse name: Not on file    Number of children: Not on file    Years of education: Not on file    Highest education level: Not on file   Occupational History    Not on file   Tobacco Use    Smoking status: Former Smoker     Types: Cigars     Quit date: 2002     Years since quittin.5    Smokeless tobacco: Never Used    Tobacco comment: quit - cigars   Substance and Sexual Activity    Alcohol use: No     Alcohol/week: 0.0 standard drinks     Comment: rare    Drug use: No    Sexual activity: Not Currently   Other Topics Concern    Not on file   Social History Narrative    Not on file     Social Determinants of Health     Financial Resource Strain:     Difficulty of Paying Living Expenses:    Food Insecurity:     Worried About Running Out of Food in the Last Year:     [address] in the Last Year:    Transportation Needs:     Lack of Transportation (Medical):  Lack of Transportation (Non-Medical):    Physical Activity:     Days of Exercise per Week:     Minutes of Exercise per Session:    Stress:     Feeling of Stress :    Social Connections:     Frequency of Communication with Friends and Family:     Frequency of Social Gatherings with Friends and Family:     Attends Taoism Services:     Active Member of Clubs or Organizations:     Attends Club or Organization Meetings:     Marital Status:    Intimate Partner Violence:     Fear of Current or Ex-Partner:     Emotionally Abused:     Physically Abused:     Sexually Abused: ALLERGIES: Other medication    Review of Systems   Constitutional: Negative for chills and fever. Respiratory: Negative for cough and shortness of breath. Cardiovascular: Negative for chest pain and palpitations. Gastrointestinal: Negative for diarrhea, nausea and vomiting. Skin: Negative for color change and wound. Neurological: Negative for dizziness and weakness.        Vitals:    07/15/21 1550 07/15/21 1641   BP: (!) 143/82 (!) 140/81   Pulse: 82 83   Resp: 17 23   Temp: 98.4 °F (36.9 °C) SpO2: 100% 97%            Physical Exam  Vitals and nursing note reviewed. Constitutional:       Appearance: Normal appearance. Cardiovascular:      Rate and Rhythm: Normal rate and regular rhythm. Pulmonary:      Effort: Pulmonary effort is normal.      Breath sounds: Normal breath sounds. Abdominal:      General: Bowel sounds are normal.      Palpations: Abdomen is soft. Neurological:      General: No focal deficit present. Mental Status: He is alert and oriented to person, place, and time. [facility]  ED Course as of Jul 15 1745   Thu Jul 15, 2021   1745 Patient's magnesium is slightly low but his blood work is otherwise unremarkable. The interrogation of his pacemaker was unremarkable.   I do not think is anything urgent or emergent and I will discharge him home.    [AC]      ED Course User Index  [AC] Estuardo Bryant MD       Procedures

## 2021-07-15 NOTE — ED TRIAGE NOTES
Arrives via ems from home. Called out for pacemaker not capturing.  Patient wife reported to ems that patient has episodes of sweats, did not relay anything else     /80 [id number] O2 4L baseline   x1 albuterol given

## 2021-07-15 NOTE — DISCHARGE INSTRUCTIONS
Return with any fevers, vomiting, difficulty breathing, worsening symptoms, or additional concerns. Follow-up with your doctors as planned.

## 2021-07-15 NOTE — ED NOTES
Report given to [facility] to assume patient care at this time.      Waiting on [facility] ems

## 2021-07-22 PROBLEM — I49.3 PVC'S (PREMATURE VENTRICULAR CONTRACTIONS): Status: ACTIVE | Noted: 2021-07-22

## 2021-10-17 NOTE — PROGRESS NOTES
This note will not be viewable in [address] [place] outreach attempt made to home numbers. Left message to return calls. Unable to reach for [facility] program, will close case. Will reopen if call is returned.
Mother is RH Positive

## 2022-03-18 PROBLEM — R31.0 GROSS HEMATURIA: Status: ACTIVE | Noted: 2021-01-01

## 2022-03-18 PROBLEM — L03.90 CELLULITIS: Status: ACTIVE | Noted: 2020-07-28

## 2022-03-18 PROBLEM — E53.8 B12 DEFICIENCY: Status: ACTIVE | Noted: 2019-06-10

## 2022-03-19 PROBLEM — Z51.5 PATIENT ON FULL HOSPICE CARE: Status: ACTIVE | Noted: 2021-01-01

## 2022-03-19 PROBLEM — J90 PLEURAL EFFUSION: Status: ACTIVE | Noted: 2021-01-01

## 2022-03-19 PROBLEM — I50.9 ACUTE ON CHRONIC CONGESTIVE HEART FAILURE (HCC): Status: ACTIVE | Noted: 2021-01-01

## 2022-03-19 PROBLEM — R26.9 ABNORMALITY OF GAIT AS LATE EFFECT OF CEREBROVASCULAR ACCIDENT (CVA): Status: ACTIVE | Noted: 2019-06-10

## 2022-03-19 PROBLEM — I69.398 ABNORMALITY OF GAIT AS LATE EFFECT OF CEREBROVASCULAR ACCIDENT (CVA): Status: ACTIVE | Noted: 2019-06-10

## 2022-03-19 PROBLEM — K59.01 SLOW TRANSIT CONSTIPATION: Status: ACTIVE | Noted: 2018-09-12

## 2022-03-19 PROBLEM — E55.9 VITAMIN D DEFICIENCY: Status: ACTIVE | Noted: 2019-10-24

## 2022-03-19 PROBLEM — N39.0 ACUTE UTI (URINARY TRACT INFECTION): Status: ACTIVE | Noted: 2021-01-01

## 2022-03-19 PROBLEM — S30.1XXA RECTUS SHEATH HEMATOMA, INITIAL ENCOUNTER: Status: ACTIVE | Noted: 2020-03-17

## 2022-03-19 PROBLEM — D64.9 NORMOCYTIC ANEMIA: Status: ACTIVE | Noted: 2020-07-28

## 2022-03-19 PROBLEM — D72.829 LEUKOCYTOSIS: Status: ACTIVE | Noted: 2020-07-28

## 2022-03-20 PROBLEM — I49.3 PVC'S (PREMATURE VENTRICULAR CONTRACTIONS): Status: ACTIVE | Noted: 2021-01-01

## 2022-03-20 PROBLEM — Z95.0 PACEMAKER: Status: ACTIVE | Noted: 2020-10-01

## 2022-03-20 PROBLEM — F32.0 CURRENT MILD EPISODE OF MAJOR DEPRESSIVE DISORDER WITHOUT PRIOR EPISODE (HCC): Status: ACTIVE | Noted: 2020-08-14
